# Patient Record
Sex: FEMALE | Race: WHITE | NOT HISPANIC OR LATINO | Employment: OTHER | URBAN - METROPOLITAN AREA
[De-identification: names, ages, dates, MRNs, and addresses within clinical notes are randomized per-mention and may not be internally consistent; named-entity substitution may affect disease eponyms.]

---

## 2017-01-11 ENCOUNTER — APPOINTMENT (OUTPATIENT)
Dept: RADIOLOGY | Facility: MEDICAL CENTER | Age: 37
End: 2017-01-11
Attending: EMERGENCY MEDICINE
Payer: MEDICARE

## 2017-01-11 ENCOUNTER — HOSPITAL ENCOUNTER (EMERGENCY)
Facility: MEDICAL CENTER | Age: 37
End: 2017-01-11
Attending: EMERGENCY MEDICINE
Payer: MEDICARE

## 2017-01-11 VITALS
RESPIRATION RATE: 18 BRPM | SYSTOLIC BLOOD PRESSURE: 120 MMHG | HEIGHT: 67 IN | OXYGEN SATURATION: 100 % | DIASTOLIC BLOOD PRESSURE: 71 MMHG | BODY MASS INDEX: 29.48 KG/M2 | WEIGHT: 187.83 LBS | HEART RATE: 75 BPM | TEMPERATURE: 96.6 F

## 2017-01-11 DIAGNOSIS — S93.502A: ICD-10-CM

## 2017-01-11 PROCEDURE — 99284 EMERGENCY DEPT VISIT MOD MDM: CPT

## 2017-01-11 PROCEDURE — 700102 HCHG RX REV CODE 250 W/ 637 OVERRIDE(OP): Performed by: EMERGENCY MEDICINE

## 2017-01-11 PROCEDURE — 302874 HCHG BANDAGE ACE 2 OR 3""

## 2017-01-11 PROCEDURE — 73630 X-RAY EXAM OF FOOT: CPT | Mod: LT

## 2017-01-11 PROCEDURE — A9270 NON-COVERED ITEM OR SERVICE: HCPCS | Performed by: EMERGENCY MEDICINE

## 2017-01-11 RX ORDER — HYDROCODONE BITARTRATE AND ACETAMINOPHEN 5; 325 MG/1; MG/1
1-2 TABLET ORAL EVERY 4 HOURS PRN
Qty: 12 TAB | Refills: 0 | Status: SHIPPED | OUTPATIENT
Start: 2017-01-11 | End: 2018-05-20

## 2017-01-11 RX ORDER — HYDROCODONE BITARTRATE AND ACETAMINOPHEN 5; 325 MG/1; MG/1
1 TABLET ORAL ONCE
Status: COMPLETED | OUTPATIENT
Start: 2017-01-11 | End: 2017-01-11

## 2017-01-11 RX ADMIN — HYDROCODONE BITARTRATE AND ACETAMINOPHEN 1 TABLET: 5; 325 TABLET ORAL at 19:57

## 2017-01-11 ASSESSMENT — PAIN SCALES - GENERAL: PAINLEVEL_OUTOF10: 10

## 2017-01-11 NOTE — ED AVS SNAPSHOT
1/11/2017          Lyssa Galvan  [address] [place] [address]    Dear Lyssa:    [facility] wants to ensure your discharge home is safe and you or your loved ones have had all your questions answered regarding your care after you leave the hospital.    You may receive a telephone call within two days of your discharge.  This call is to make certain you understand your discharge instructions as well as ensure we provided you with the best care possible during your stay with us.     The call will only last approximately 3-5 minutes and will be done by a nurse.    Once again, we want to ensure your discharge home is safe and that you have a clear understanding of any next steps in your care.  If you have any questions or concerns, please do not hesitate to contact us, we are here for you.  Thank you for choosing [facility] for your healthcare needs.    Sincerely,    Surinder Donohue    [facility]

## 2017-01-11 NOTE — ED AVS SNAPSHOT
After Visit Summary                                                                                                                Lyssa Galvan   MRN: [id number]    Department:  [facility], Emergency Dept   Date of Visit:  1/11/2017            [facility], Emergency Dept    [address]    [place] [address]    Phone:  [phone]      You were seen by     Leanne Davis M.D.      Your Diagnosis Was     Sprain of toe, great, left, initial encounter     S93.502A       These are the medications you received during your hospitalization from 01/11/2017 1832 to 01/11/2017 2027     Date/Time Order Dose Route Action    01/11/2017 1957 hydrocodone-acetaminophen (NORCO) 5-325 MG per tablet 1 Tab 1 Tab Oral Given      Follow-up Information     1. Follow up with [facility].    Specialty:  Family Medicine    Contact information    [address]  [address]  [place] [address] [phone]        Medication Information     Review all of your home medications and newly ordered medications with your primary doctor and/or pharmacist as soon as possible. Follow medication instructions as directed by your doctor and/or pharmacist.     Please keep your complete medication list with you and share with your physician. Update the information when medications are discontinued, doses are changed, or new medications (including over-the-counter products) are added; and carry medication information at all times in the event of emergency situations.               Medication List      START taking these medications        Instructions    hydrocodone-acetaminophen 5-325 MG Tabs per tablet   Commonly known as:  NORCO    Take 1-2 Tabs by mouth every four hours as needed.   Dose:  1-2 Tab         ASK your doctor about these medications        Instructions    DEPO-PROVERA IM    by Intramuscular route.       divalproex 250 MG Tbec   Commonly known as:  DEPAKOTE    Take 250 mg by mouth 3 times a day. 5 tabs  am, 5 tabs pm   Dose:  250 mg       FOLIC ACID PO    Take  by mouth.       ibuprofen 600 MG Tabs   Commonly known as:  MOTRIN    Take 600 mg by mouth.   Dose:  600 mg       IRON CR PO    Take  by mouth.       PRENATAL FORMULA PO    Take  by mouth. 2 different types       TEGRETOL  MG Tb12   Generic drug:  carbamazepine SR    Take 600 mg by mouth 2 times a day.   Dose:  600 mg               Procedures and tests performed during your visit     DX-FOOT-COMPLETE 3+ LEFT        Discharge Instructions       Foot Sprain  You have a sprained foot. When you twist your foot, the ligaments that hold the joints together are injured. This may cause pain, swelling, bruising, and difficulty walking. Proper treatment will shorten your disability and help you prevent re-injury. To treat a sprained foot you should:    Do not drive, operate any machinery, or partake in dangerous activities that require maximum physical and mental performance while taking the prescribed pain killer. Do not take tylenol or tylenol containing products in addition to the pain killer that was prescibed, as the excess tylenol can cause life threatening liver problems. Do not combine this drug with alcohol or other sedatives or narcotics. Follow up with your primary care doctor in regards to the future management of this medication.    · Elevate your foot for the next 2-3 days to reduce swelling.   · Apply ice packs to the foot for 20-30 minutes every 2-3 hours.   · Wrap your foot with a compression bandage as long as it is swollen or tender.   · Do not walk on your foot if it still hurts a lot.  Use crutches or a cane until weight bearing becomes painless.   · Special podiatric shoes or shoes with rigid soles may be useful in allowing earlier walking.   Only take over-the-counter or prescription medicines for pain, discomfort, or fever as directed by your caregiver. Most foot sprains will heal completely in 3-6 weeks with proper rest.  If you still  have pain or swelling after 2-3 weeks, or if your pain worsens, you should see your doctor for further evaluation.  Document Released: 01/25/2006 Document Revised: 03/11/2013 Document Reviewed: 12/19/2009  [employer]® Patient Information ©2013 [employer].          Patient Information     Patient Information    Following emergency treatment: all patient requiring follow-up care must return either to a private physician or a clinic if your condition worsens before you are able to obtain further medical attention, please return to the emergency room.     Billing Information    At [facility], we work to make the billing process streamlined for our patients.  Our Representatives are here to answer any questions you may have regarding your hospital bill.  If you have insurance coverage and have supplied your insurance information to us, we will submit a claim to your insurer on your behalf.  Should you have any questions regarding your bill, we can be reached online or by phone as follows:  Online: You are able pay your bills online or live chat with our representatives about any billing questions you may have. We are here to help Monday - Friday from 8:00am to 7:30pm and 9:00am - 12:00pm on Saturdays.  Please visit [email or web address]  for more information.   Phone:  [phone] or [phone]    Please note that your emergency physician, surgeon, pathologist, radiologist, anesthesiologist, and other specialists are not employed by [facility] and will therefore bill separately for their services.  Please contact them directly for any questions concerning their bills at the numbers below:     Emergency Physician Services:  [phone]  [facility]:  [phone]  [facility]:  [phone]  [facility]:  [phone]    1. Your final bill may vary from the amount quoted upon discharge if all procedures are not complete at that time, or  if your doctor has additional procedures of which we are not aware. You will receive an additional bill if you return to the Emergency Department at [facility] for suture removal regardless of the facility of which the sutures were placed.     2. Please arrange for settlement of this account at the emergency registration.    3. All self-pay accounts are due in full at the time of treatment.  If you are unable to meet this obligation then payment is expected within 4-5 days.     4. If you have had radiology studies (CT, X-ray, Ultrasound, MRI), you have received a preliminary result during your emergency department visit. Please contact the radiology department [phone] to receive a copy of your final result. Please discuss the Final result with your primary physician or with the follow up physician provided.     Crisis Hotline:  [place] Crisis Hotline:  7-512-HJLPCQM or 1-286.236.7375  [place] Crisis Hotline:    1-182.493.8574 or 389-467-8144         ED Discharge Follow Up Questions    1. In order to provide you with very good care, we would like to follow up with a phone call in the next few days.  May we have your permission to contact you?     YES /  NO    2. What is the best phone number to call you? (       )_____-__________    3. What is the best time to call you?      Morning  /  Afternoon  /  Evening                   Patient Signature:  ____________________________________________________________    Date:  ____________________________________________________________

## 2017-01-11 NOTE — ED AVS SNAPSHOT
PatientsLikeMe Access Code: [email or web address]  Expires: 2/10/2017  8:27 PM    Your email address is not on file at [facility].  Email Addresses are required for you to sign up for PatientsLikeMe, please contact [phone] to verify your personal information and to provide your email address prior to attempting to register for PatientsLikeMe.    Lyssa [address] [place] [address]    [employer]  A secure, online tool to manage your health information     [facility]’s PatientsLikeMe® is a secure, online tool that connects you to your personalized health information from the privacy of your home -- day or night - making it very easy for you to manage your healthcare. Once the activation process is completed, you can even access your medical information using the PatientsLikeMe winnie, which is available for free in the Apple Winnie store or Google Play store.     To learn more about PatientsLikeMe, visit [email or web address]    There are two levels of access available (as shown below):   My Chart Features  [facility] Primary Care Doctor [facility]  Specialists [facility]  Urgent  Care Non-[facility] Primary Care Doctor   Email your healthcare team securely and privately 24/7 X X X    Manage appointments: schedule your next appointment; view details of past/upcoming appointments X      Request prescription refills. X      View recent personal medical records, including lab and immunizations X X X X   View health record, including health history, allergies, medications X X X X   Read reports about your outpatient visits, procedures, consult and ER notes X X X X   See your discharge summary, which is a recap of your hospital and/or ER visit that includes your diagnosis, lab results, and care plan X X  X     How to register for PatientsLikeMe:  Once your e-mail address has been verified, follow the following steps to sign up for PatientsLikeMe.     1. Go to  [email or web address]  2. Click on the Sign Up Now box, which takes you to the New Member Sign Up page. You  will need to provide the following information:  a. Enter your [employer] Access Code exactly as it appears at the top of this page. (You will not need to use this code after you’ve completed the sign-up process. If you do not sign up before the expiration date, you must request a new code.)   b. Enter your date of birth.   c. Enter your home email address.   d. Click Submit, and follow the next screen’s instructions.  3. Create a [employer] ID. This will be your [employer] login ID and cannot be changed, so think of one that is secure and easy to remember.  4. Create a [employer] password. You can change your password at any time.  5. Enter your Password Reset Question and Answer. This can be used at a later time if you forget your password.   6. Enter your e-mail address. This allows you to receive e-mail notifications when new information is available in [employer].  7. Click Sign Up. You can now view your health information.    For assistance activating your [employer] account, call [phone]

## 2017-01-12 NOTE — ED NOTES
Ace wrap applied as instructed by Dr. Davis. Patient appropriate for discharge per ERP. Discussed discharge instructions, home care, and follow up with patient. Patient verbalized understanding. No distress noted. Pt sts family is providing ride home.

## 2017-01-12 NOTE — ED PROVIDER NOTES
"ED Provider Note    CHIEF COMPLAINT  Chief Complaint   Patient presents with   • T-5000 Extremity Pain       HPI  Lyssa Galvan is a 36 y.o. female who presents to the emergency department with chief complaint of left great toe pain. The patient states that a few days ago she accidentally kicked her bed and the bare feet, since then she's had worsening pain and radiates up her big toe and through the midfoot. She denies any other trauma, numbness tingling or open wounds. She has not been doing any rice or elevation treatment has not been wrapping it has been taking ibuprofen but not any other medications. Denies any other trauma    REVIEW OF SYSTEMS  See HPI for further details. All other systems are negative.     PAST MEDICAL HISTORY   has a past medical history of Seizure and Stroke.    SOCIAL HISTORY  Social History     Social History Main Topics   • Smoking status: Never Smoker    • Smokeless tobacco: Not on file   • Alcohol Use: No   • Drug Use: No   • Sexual Activity: Not on file       SURGICAL HISTORY  patient denies any surgical history    CURRENT MEDICATIONS  Home Medications     **Home medications have not yet been reviewed for this encounter**          ALLERGIES  No Known Allergies    PHYSICAL EXAM  VITAL SIGNS: /71 mmHg  Pulse 75  Temp(Src) 35.9 °C (96.6 °F)  Resp 18  Ht 1.702 m (5' 7\")  Wt 85.2 kg (187 lb 13.3 oz)  BMI 29.41 kg/m2  SpO2 100%   Pulse ox interpretation: I interpret this pulse ox as normal.  Constitutional: Alert in no apparent distress.  HENT: Normocephalic, Atraumatic  Eyes: Pupils are equal and reactive. Conjunctiva normal, non-icteric.   Heart: Regular rate and rythm, no murmurs.    Lungs: Clear to auscultation bilaterally.  Abdomen: Non-tender, non-distended, normal bowel sounds  EXT: L big to pain w movement of the great toe along the flexus hallices longus - some swelling of the midfoot and ttp and no erythema or warmth to the touch or joint effusion  Skin: Warm, Dry, " No erythema, No rash.   Neurologic: Alert, Grossly non-focal.       DIFFERENTIAL DIAGNOSIS AND WORK UP PLAN    This is a 36 y.o. female who presents with left great toe pain and midfoot swelling without any evidence of big toe joints or ankle swelling that would indicate a septic infection or any evidence of warmth or erythema - not concerning for cellulitis or abscess. Patient likely has strain sprain or occult fracture, will put ice on the foot elevate treat her with oral pain medicine x-ray and reassess    Radiology  DX-FOOT-COMPLETE 3+ LEFT   Final Result         1.  No acute traumatic bony injury.        The radiologist's interpretation of all radiological studies have been reviewed by me.    COURSE & MEDICAL DECISION MAKING  Pertinent Labs & Imaging studies reviewed. (See chart for details)    8:27 PM  Rechecked the patient after the eyes neuro pain med she is feeling better, discussed with her x-ray findings likely has strain to the mid foot big toe, she feels comfortable going home we discussed Rice treatment she states his heart because she has a 2-year-old daughter, however she'll be sent home with pain meds discussed that she needs to rest all symptoms only get worse she was given an Ace wrap and primary care follow-up    Her pmpaware showed no signs of abuse    The patient will not drink alcohol nor drive with prescribed medications. The patient will return for worsening symptoms and is stable at the time of discharge. The patient verbalizes understanding and will comply.    FOLLOW UP    [facility]  [address]  [address]  [place] [address]  [phone]            FINAL IMPRESSION  1. Sprain of toe, great, left, initial encounter            Electronically signed by: Leanne Davis, 1/11/2017 7:39 PM    This dictation has been created using voice recognition software and/or scribes. The accuracy of the dictation is limited by the abilities of the software and the expertise of the scribes. I expect  there may be some errors of grammar and possibly content. I made every attempt to manually correct the errors within my dictation. However, errors related to voice recognition software and/or scribes may still exist and should be interpreted within the appropriate context.

## 2017-01-12 NOTE — DISCHARGE INSTRUCTIONS
Foot Sprain  You have a sprained foot. When you twist your foot, the ligaments that hold the joints together are injured. This may cause pain, swelling, bruising, and difficulty walking. Proper treatment will shorten your disability and help you prevent re-injury. To treat a sprained foot you should:    Do not drive, operate any machinery, or partake in dangerous activities that require maximum physical and mental performance while taking the prescribed pain killer. Do not take tylenol or tylenol containing products in addition to the pain killer that was prescibed, as the excess tylenol can cause life threatening liver problems. Do not combine this drug with alcohol or other sedatives or narcotics. Follow up with your primary care doctor in regards to the future management of this medication.    · Elevate your foot for the next 2-3 days to reduce swelling.   · Apply ice packs to the foot for 20-30 minutes every 2-3 hours.   · Wrap your foot with a compression bandage as long as it is swollen or tender.   · Do not walk on your foot if it still hurts a lot.  Use crutches or a cane until weight bearing becomes painless.   · Special podiatric shoes or shoes with rigid soles may be useful in allowing earlier walking.   Only take over-the-counter or prescription medicines for pain, discomfort, or fever as directed by your caregiver. Most foot sprains will heal completely in 3-6 weeks with proper rest.  If you still have pain or swelling after 2-3 weeks, or if your pain worsens, you should see your doctor for further evaluation.  Document Released: 01/25/2006 Document Revised: 03/11/2013 Document Reviewed: 12/19/2009  [employer]® Patient Information ©2013 [employer].

## 2018-05-20 ENCOUNTER — HOSPITAL ENCOUNTER (EMERGENCY)
Facility: MEDICAL CENTER | Age: 38
End: 2018-05-20
Attending: EMERGENCY MEDICINE
Payer: MEDICARE

## 2018-05-20 ENCOUNTER — APPOINTMENT (OUTPATIENT)
Dept: RADIOLOGY | Facility: MEDICAL CENTER | Age: 38
End: 2018-05-20
Attending: EMERGENCY MEDICINE
Payer: MEDICARE

## 2018-05-20 VITALS
SYSTOLIC BLOOD PRESSURE: 117 MMHG | DIASTOLIC BLOOD PRESSURE: 78 MMHG | OXYGEN SATURATION: 97 % | HEART RATE: 72 BPM | RESPIRATION RATE: 16 BRPM | WEIGHT: 179.45 LBS | TEMPERATURE: 98.3 F | BODY MASS INDEX: 28.11 KG/M2

## 2018-05-20 VITALS
HEIGHT: 67 IN | RESPIRATION RATE: 16 BRPM | WEIGHT: 189 LBS | TEMPERATURE: 98.3 F | OXYGEN SATURATION: 99 % | DIASTOLIC BLOOD PRESSURE: 68 MMHG | SYSTOLIC BLOOD PRESSURE: 107 MMHG | HEART RATE: 73 BPM | BODY MASS INDEX: 29.66 KG/M2

## 2018-05-20 DIAGNOSIS — M25.571 CHRONIC PAIN OF RIGHT ANKLE: Primary | ICD-10-CM

## 2018-05-20 DIAGNOSIS — S93.401A SPRAIN OF RIGHT ANKLE, UNSPECIFIED LIGAMENT, INITIAL ENCOUNTER: ICD-10-CM

## 2018-05-20 DIAGNOSIS — G89.29 CHRONIC PAIN OF RIGHT ANKLE: Primary | ICD-10-CM

## 2018-05-20 PROCEDURE — A9270 NON-COVERED ITEM OR SERVICE: HCPCS | Performed by: EMERGENCY MEDICINE

## 2018-05-20 PROCEDURE — 99284 EMERGENCY DEPT VISIT MOD MDM: CPT

## 2018-05-20 PROCEDURE — 73610 X-RAY EXAM OF ANKLE: CPT | Mod: RT

## 2018-05-20 PROCEDURE — 700102 HCHG RX REV CODE 250 W/ 637 OVERRIDE(OP): Performed by: EMERGENCY MEDICINE

## 2018-05-20 PROCEDURE — 73630 X-RAY EXAM OF FOOT: CPT | Mod: RT

## 2018-05-20 PROCEDURE — 99283 EMERGENCY DEPT VISIT LOW MDM: CPT

## 2018-05-20 RX ORDER — IBUPROFEN 600 MG/1
600 TABLET ORAL ONCE
Status: COMPLETED | OUTPATIENT
Start: 2018-05-20 | End: 2018-05-20

## 2018-05-20 RX ORDER — IBUPROFEN 600 MG/1
600 TABLET ORAL EVERY 6 HOURS PRN
Qty: 30 TAB | Refills: 0 | Status: SHIPPED | OUTPATIENT
Start: 2018-05-20 | End: 2018-10-07

## 2018-05-20 RX ADMIN — IBUPROFEN 600 MG: 600 TABLET, FILM COATED ORAL at 19:37

## 2018-05-20 ASSESSMENT — PAIN SCALES - GENERAL
PAINLEVEL_OUTOF10: 10

## 2018-05-20 NOTE — ED NOTES
Pt roomed from nidia singh. Gait steady. Pt presents for symptoms as stated in the triage note. Pt denies any injury for ankle pain. Pt states pain has been present x1 week. Pt states she is able to walk on foot but has increase in pain when doing so.

## 2018-05-20 NOTE — ED PROVIDER NOTES
"ED Provider Note    CHIEF COMPLAINT  Chief Complaint   Patient presents with   • Ankle Pain     Right       HPI  Lyssa Leblanc is a 37 y.o. female who and relates to the emergency department with a limp and ankle brace complaining of right ankle pain and swelling.  Patient with history of chronic right ankle pain, swelling, stiffness since childhood surgery, recently started going to physical therapy for decreased range of motion.  Patient complains of increased swelling to the right ankle, pain with range of motion.  Feels as though her ankle splint is ineffective.  Denies any trauma, fall or injury aside from increased activities at physical therapy.  Denies discoloration.  Denies paresthesias.  Denies knee or hip pain.    REVIEW OF SYSTEMS  See HPI for further details.     PAST MEDICAL HISTORY   has a past medical history of Seizure (HCC) and Stroke (HCC).    SOCIAL HISTORY  Social History     Social History Main Topics   • Smoking status: Never Smoker   • Smokeless tobacco: Never Used   • Alcohol use No   • Drug use: No   • Sexual activity: Not on file       SURGICAL HISTORY  patient denies any surgical history    CURRENT MEDICATIONS  Home Medications     Reviewed by Kayce Lora R.N. (Registered Nurse) on 05/20/18 at 1150  Med List Status: Complete   Medication Last Dose Status   carbamazepine SR (TEGRETOL XR) 400 MG TB12 5/20/2018 Active   divalproex EC (DEPAKOTE) 250 MG TBEC 5/20/2018 Active                ALLERGIES  No Known Allergies    PHYSICAL EXAM  VITAL SIGNS: /68   Pulse 73   Temp 36.8 °C (98.3 °F)   Resp 16   Ht 1.702 m (5' 7\")   Wt 85.7 kg (189 lb)   SpO2 99%   BMI 29.60 kg/m²   Pulse ox interpretation: I interpret this pulse ox as normal.  Constitutional: Alert in no apparent distress.  Odd affect.  HENT: Normocephalic, atraumatic. Bilateral external ears normal, Nose normal. Moist mucous membranes.    Eyes: Pupils are equal and reactive, Conjunctiva normal.   Neck: Normal range " of motion, Supple  Cardiovascular: Normal peripheral perfusion.  Thorax & Lungs: Nonlabored respirations.  Skin: Warm, Dry  Musculoskeletal: Tenderness to palpation right lateral malleolus.  Minimal swelling.  No crepitus or deformity.  Decreased range of motion, appears chronic secondary to stiffness.  2+ DP, sensation intact to light touch distally.  Patient bears weight and ambulates independently with mild limp.  Full range of motion at knee and hip without difficulty or discomfort.  Neurologic: Alert , moves 4 extremities spontaneously per  Psychiatric: Odd affect.  Cooperative.      DIAGNOSTIC STUDIES / PROCEDURES    RADIOLOGY  DX-ANKLE 3+ VIEWS RIGHT    (Results Pending)       COURSE & MEDICAL DECISION MAKING  ED evaluation for acute on chronic right ankle pain and swelling clinically unrevealing.  CMS is intact distally.  No cutaneous lesions or evidence for cellulitis.  Patient denies new trauma or injury but does state symptoms have worsened since starting physical therapy.  Patient requests x-ray for further evaluation as she states she was referred from physical therapy but has left AMA without further evaluation, imaging or discussion with me.  Patient ambulated from the emergency department.      FINAL IMPRESSION  (M25.571,  G89.29) Chronic pain of right ankle  (primary encounter diagnosis)      Electronically signed by: Leanne Jones, 5/20/2018 12:39 PM      This dictation was created using voice recognition software. The accuracy of the dictation is limited to the abilities of the software. I expect there may be some errors of grammar and possibly content. The nursing notes were reviewed and certain aspects of this information were incorporated into this note.

## 2018-05-20 NOTE — ED TRIAGE NOTES
"Pt c/o right ankle pain for several years. Pt states \"I need an airsplint\". Has own splint on she bought from [employer].   "

## 2018-05-21 NOTE — DISCHARGE INSTRUCTIONS
Ice to painful area 20 minutes at a time 3-4 times a day. Return for increasing pain, numbness, weakness or cold blue foot.   Ankle Sprain  Introduction  An ankle sprain is a stretch or tear in one of the tough tissues (ligaments) in your ankle.  Follow these instructions at home:  · Rest your ankle.  · Take over-the-counter and prescription medicines only as told by your doctor.  · For 2-3 days, keep your ankle higher than the level of your heart (elevated) as much as possible.  · If directed, put ice on the area:  ¨ Put ice in a plastic bag.  ¨ Place a towel between your skin and the bag.  ¨ Leave the ice on for 20 minutes, 2-3 times a day.  · If you were given a brace:  ¨ Wear it as told.  ¨ Take it off to shower or bathe.  ¨ Try not to move your ankle much, but wiggle your toes from time to time. This helps to prevent swelling.  · If you were given an elastic bandage (dressing):  ¨ Take it off when you shower or bathe.  ¨ Try not to move your ankle much, but wiggle your toes from time to time. This helps to prevent swelling.  ¨ Adjust the bandage to make it more comfortable if it feels too tight.  ¨ Loosen the bandage if you lose feeling in your foot, your foot tingles, or your foot gets cold and blue.  · If you have crutches, use them as told by your doctor. Continue to use them until you can walk without feeling pain in your ankle.  Contact a doctor if:  · Your bruises or swelling are quickly getting worse.  · Your pain does not get better after you take medicine.  Get help right away if:  · You cannot feel your toes or foot.  · Your toes or your foot looks blue.  · You have very bad pain that gets worse.  This information is not intended to replace advice given to you by your health care provider. Make sure you discuss any questions you have with your health care provider.  Document Released: 06/05/2009 Document Revised: 05/25/2017 Document Reviewed: 07/19/2016  © 2017 Elsevier

## 2018-05-21 NOTE — ED NOTES
D/C instructions provided to patient at bedside, verbalizes understanding and states plans for follow-up with PCP as recommended.   Scripts given. Vitals obtained.   All belongings accounted for, all questions answered at this time.

## 2018-05-21 NOTE — ED PROVIDER NOTES
"ED Provider Note    Scribed for Terrance Cardenas M.D. by Regan Grace. 5/20/2018, 7:09 PM.    Primary care provider: [facility] (Inactive)  Means of arrival: Walk-in  History obtained from: Patient  History limited by: None    CHIEF COMPLAINT  Chief Complaint   Patient presents with   • Ankle Pain     pain and swelling to (R) for \"a long time\",  hx of surgery on same foot        HPI  Lyssa Leblanc is a 37 y.o. female who presents to the Emergency Department with right ankle pain onset \"a long time ago\". The patient has a history of a surgery on the same foot. She states she had \"tendons removed when I was little because I was walking inwards\". Since this surgery she reports associated edema. Patient denies any new injuries or trauma. She states she just started physical therapy with some relief to her pain. The patient denies fever, chills, and chest pain. Lyssa has not seen an orthopedic surgeon.     REVIEW OF SYSTEMS  Pertinent positives include right ankle pain, right ankle swelling. Pertinent negatives include fever, chills, chest pain. E.     PAST MEDICAL HISTORY   has a past medical history of Seizure (HCC) and Stroke (HCC).    SURGICAL HISTORY  patient denies any surgical history    SOCIAL HISTORY  Social History   Substance Use Topics   • Smoking status: Never Smoker   • Smokeless tobacco: Never Used   • Alcohol use No      History   Drug Use No       FAMILY HISTORY  No pertinent history noted.     CURRENT MEDICATIONS  Home Medications    **Home medications have not yet been reviewed for this encounter**         ALLERGIES  No Known Allergies    PHYSICAL EXAM  VITAL SIGNS: /69   Pulse 68   Temp 36.8 °C (98.3 °F)   Resp 16   Wt 81.4 kg (179 lb 7.3 oz)   SpO2 99%   BMI 28.11 kg/m²     Constitutional: Well developed, Well nourished, Mild distress.   HENT: Normocephalic, Atraumatic.  Eyes: Conjunctiva normal, No discharge.   Cardiovascular: Normal heart rate, Normal rhythm, No " murmurs, equal pulses.   Pulmonary: Normal breath sounds, No respiratory distress, No wheezing, No rales, No rhonchi.  Chest: No chest wall tenderness or deformity.    Musculoskeletal: Tenderness along the distal posterior aspect of the right malleolus as well as the proximal 5th metatarsal. 2+ DP. Normal capillary refill in all 5 toes. No other bony tenderness. No pain or tenderness in the proximal fibula. Mild swelling.   Skin: Warm, Dry, No erythema, No rash.   Neurologic: Alert & oriented x 3, Normal motor function,  No focal deficits noted.   Psychiatric: Affect normal, Judgment normal, Mood normal.     RADIOLOGY  DX-FOOT-COMPLETE 3+ RIGHT   Final Result      No evidence of fracture or dislocation.      DX-ANKLE 3+ VIEWS RIGHT   Final Result      No evidence of fracture or dislocation.        The radiologist's interpretation of all radiological studies have been reviewed by me.    COURSE & MEDICAL DECISION MAKING  Pertinent Labs & Imaging studies reviewed. (See chart for details)    7:09 PM - Patient seen and examined at bedside. Patient will be treated with Motrin 600 mg. Ordered Dx-Foot, Dx-Ankle to evaluate her symptoms. The differential diagnoses include but are not limited to: Fracture vs sprain vs chronic pain    7:49 PM Recheck: Patient is resting comfortably and reports feeling improved. I updated her on her results, which did not indicate any acute abnormalities. I explained that she is now stable for discharge with a prescription for Motrin. I advised her to follow up with her primary care provider and to return to the ED for new or worsening symptoms. She understands and will comply.     Medical Decision Making: At this point time I think patient most likely has a slight sprain or increased swelling secondary to her physical therapy.  X-rays are negative for any fracture.  At this point in time I will have the patient follow-up with orthopedics if she continues to have significant pain and swelling.   Patient was given a ankle splint for support.  DISPOSITION:  Patient will be discharged home in stable condition.    FOLLOW UP:  [facility]  [address]  [address]  [place] [address]  [phone]    Schedule an appointment as soon as possible for a visit in 3 days      Mack Alfaro M.D.  [address] [place] [address]  [phone]    In 1 week  If you keep having significant pain.       OUTPATIENT MEDICATIONS:  Discharge Medication List as of 5/20/2018  7:50 PM      START taking these medications    Details   ibuprofen (MOTRIN) 600 MG Tab Take 1 Tab by mouth every 6 hours as needed., Disp-30 Tab, R-0, Print Rx Paper           FINAL IMPRESSION  1. Sprain of right ankle, unspecified ligament, initial encounter          IRegan (Scribe), am scribing for, and in the presence of, Terrance Cardenas M.D.    Electronically signed by: Regan Grace (Scribe), 5/20/2018    ITerrance M.D. personally performed the services described in this documentation, as scribed by Regan Grace in my presence, and it is both accurate and complete.    The note accurately reflects work and decisions made by me.  Terrance Cardenas  5/21/2018  2:49 AM

## 2018-05-21 NOTE — ED TRIAGE NOTES
"Lyssa Leblanc  Chief Complaint   Patient presents with   • Ankle Pain     pain and swelling to (R) for \"a long time\",  hx of surgery on same foot      Pt ambulatory to triage with above complaint.  VSS.   Pt returned to lobby, educated on triage process, and to inform staff of any changes or concerns.     "

## 2018-05-23 ENCOUNTER — OFFICE VISIT (OUTPATIENT)
Dept: NEUROLOGY | Facility: MEDICAL CENTER | Age: 38
End: 2018-05-23
Payer: MEDICARE

## 2018-05-23 VITALS
HEIGHT: 67 IN | HEART RATE: 69 BPM | BODY MASS INDEX: 28.24 KG/M2 | WEIGHT: 179.9 LBS | SYSTOLIC BLOOD PRESSURE: 108 MMHG | OXYGEN SATURATION: 92 % | DIASTOLIC BLOOD PRESSURE: 66 MMHG | TEMPERATURE: 98.2 F | RESPIRATION RATE: 16 BRPM

## 2018-05-23 DIAGNOSIS — G40.909 SEIZURE DISORDER (HCC): ICD-10-CM

## 2018-05-23 DIAGNOSIS — I63.9 CEREBROVASCULAR ACCIDENT (CVA), UNSPECIFIED MECHANISM (HCC): ICD-10-CM

## 2018-05-23 DIAGNOSIS — F81.9 LEARNING DIFFICULTY: ICD-10-CM

## 2018-05-23 PROCEDURE — 99204 OFFICE O/P NEW MOD 45 MIN: CPT | Performed by: NURSE PRACTITIONER

## 2018-05-23 RX ORDER — CARBAMAZEPINE 400 MG/1
400 TABLET, EXTENDED RELEASE ORAL 2 TIMES DAILY
Qty: 60 TAB | Refills: 5 | Status: SHIPPED | OUTPATIENT
Start: 2018-05-23 | End: 2018-10-10 | Stop reason: SDUPTHER

## 2018-05-23 RX ORDER — DIVALPROEX SODIUM 250 MG/1
TABLET, DELAYED RELEASE ORAL
Qty: 300 TAB | Refills: 5 | Status: SHIPPED | OUTPATIENT
Start: 2018-05-23 | End: 2018-10-10 | Stop reason: SDUPTHER

## 2018-05-23 ASSESSMENT — ENCOUNTER SYMPTOMS
COUGH: 0
CONSTITUTIONAL NEGATIVE: 1
NAUSEA: 0
ABDOMINAL PAIN: 0
HEADACHES: 0
NERVOUS/ANXIOUS: 0
SEIZURES: 0
DIARRHEA: 0
VOMITING: 0
DOUBLE VISION: 0
MUSCULOSKELETAL NEGATIVE: 1
DEPRESSION: 0
SORE THROAT: 0

## 2018-05-23 ASSESSMENT — PATIENT HEALTH QUESTIONNAIRE - PHQ9: CLINICAL INTERPRETATION OF PHQ2 SCORE: 0

## 2018-05-23 NOTE — PROGRESS NOTES
"Subjective:      Lyssa Leblanc is a 37 y.o. female who presents with New Patient (Seizure Disorder)           HPI  Last seen per neurologist in [place], [place].  Moved to the area July 2014.    She is needing some refills of her medication.    History of seizures since birth.  She had a stroke \"after delivery\" about 3 hours.  G1-P1.    Childhood: has been in special needs classes, does have a diploma.  She is transitioned with [facility]-- they come to the house and help her a few days per week.  They help her clean, etc.  Her parents help with her finances and grocery shopping.    Last known seizure was when she was going into labor in 2014.  Eyes turn to the right, has had GTC seizure.    Claims that she is compliant with her medication.    Tried and failed: phenobarbital.    She is the mother of 3 children-- one child passed away-- 24 week premie.  Son has Klinefelters-- XXY chromosome and a \"hole in his heart\" and severe ADHD.  Daughter is 3, teeth are fused, \"hole in her heart that sealed up\", hearing is limited, speech delayed and followed by the [facility] for speech and OT.    Name brand Depakote ER 250mg tablets, 1250mg BID  Tegretol XR 400mg BID    Medical history: Hypercholesterolemia  Teeth: poor dentition, followed through the [facility] clinic    Surgical history: Right hand tendon release, right foot tendon release through [facility].    Lives in [place] and down the street from her father and her step-mother.  TV watcher.  She has a great memory for songs.    She does get very irritable.    Depo-provera.  Current Outpatient Prescriptions   Medication Sig Dispense Refill   • ibuprofen (MOTRIN) 600 MG Tab Take 1 Tab by mouth every 6 hours as needed. 30 Tab 0   • divalproex EC (DEPAKOTE) 250 MG TBEC Take 250 mg by mouth 3 times a day. 5 tabs am, 5 tabs pm     • carbamazepine SR (TEGRETOL XR) 400 MG TB12 Take 600 mg by mouth 2 times a day.       No current facility-administered medications for this " "visit.          Review of Systems   Constitutional: Negative.    HENT: Negative for hearing loss, nosebleeds and sore throat.         No recent head injury.   Eyes: Negative for double vision.        No new loss of vision.   Respiratory: Negative for cough.         No recent lung infections.   Cardiovascular: Negative for chest pain.   Gastrointestinal: Negative for abdominal pain, diarrhea, nausea and vomiting.   Genitourinary: Negative.    Musculoskeletal: Negative.    Skin: Negative.    Neurological: Negative for seizures and headaches.   Endo/Heme/Allergies:        No history of endocrine dysfunction.  No new problems.   Psychiatric/Behavioral: Negative for depression. The patient is not nervous/anxious.         No recent mood changes.          Objective:     /66   Pulse 69   Temp 36.8 °C (98.2 °F)   Resp 16   Ht 1.702 m (5' 7\")   Wt 81.6 kg (179 lb 14.3 oz)   SpO2 92%   BMI 28.18 kg/m²      Physical Exam   Constitutional: She is oriented to person, place, and time. She appears well-developed and well-nourished. No distress.   HENT:   Head: Normocephalic and atraumatic.   Nose: Nose normal.   Eyes: Pupils are equal, round, and reactive to light.   Used to wear glasses   Neck: Normal range of motion.   Cardiovascular: Normal rate and regular rhythm.  Exam reveals no gallop and no friction rub.    No murmur heard.  Pulmonary/Chest: Effort normal and breath sounds normal. No respiratory distress.   Musculoskeletal: Normal range of motion.   Lymphadenopathy:     She has no cervical adenopathy.   Neurological: She is alert and oriented to person, place, and time. She has normal strength and normal reflexes. No cranial nerve deficit. She exhibits normal muscle tone. Coordination (h/o right tendon release) abnormal. Gait normal.   Naturally right-handed, right hemiparesis.    CN II: Fundi normal, visual fields full to confrontation.  CN III, IV, VI: Pupils equal, round, and reactive to light.  Extraocular " movements full and intact in horizontal and vertical gaze.  CN V: Normal in motor and sensory modalities.  CN VII: No evidence of facial asymmetry.  CN VIII: Hearing grossly intact.  CN IX, X: Palate elevates symmetrically and in the midline.  CN XI: Normal sternocleidomastoid strength.  CN XII: Tongue is in the midline.    Motor: Normal muscle bulk and tone, with full and symmetric strength.  Sensory: Intact to light touch, pinprick, vibration, proprioception, and graphesthesia.  DTR's: 1+ LUE and LLE with flexor plantar responses.  RUE and RLE with tendon releases.  Cerebellar/Coordination: Normal finger to finger, finger-tapping, rapid alternating movements, and foot tapping.  Gait: Normal casual gait.  Walks well on heels and toes, as well as in tandem gait.   Skin: Skin is warm and dry.   Psychiatric:   Learning delayed, quiet             Assessment/Plan:     Seizure Disorder, s/p  intracranial hemorrhage:  History of seizures since birth.  Last known seizure was during labor and delivery in .    No EEG or Brain MRI on record.    Continue Depakote 1250mg BID and Tegretol XR 400mg BID.    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    Discussed potential side effects of Depakote and Tegretol.  I would encourage the option of transitioning off or Tegretol in the near future.    Obtain VEEG to evaluate for subclinical seizures.  Will then evaluate the EEG results and consider AED options.    Needs to be taking PNV, calcium 2000mg and Vit D3 2000IU per day.    Obtain labs as ordered.    Return for follow-up after EEG to discuss management.  Return for follow-up in 4 months.      EDUCATION AND COUNSELING:  -Education was provided to the patient and/or family regarding diagnosis and prognosis. The chronic and unpredictable nature of the condition was discussed. There is increased risk for additional events, which may carry potential for significant injuries and  death.    -We reviewed the current antiepileptic regimen. Potential side effects of antiepileptics were discussed at length, including but no limited to: hypersensitivity reactions (rash and others, some of which can be fatal), visual field changes (some of which may be irreversible), glaucoma, diplopia, kidney stones, osteopenia/osteoporosis/bone fractures, hyperthermia/anhydrosis, tremors, ataxia, dizziness, fatigue, increased risk for falls, cardiac arrhythmias/syncope, gastrointestinal (hepatitis, pancreatitis, gastritis, ulcers), gingival hypertrophy, drowsiness, sedation, anxiety/nervousness, increased risk for suicide, increased risk for depression, and psychosis. We reviewed drug-drug interactions and their potential effect on seizure control and medication side effects.    -Patient/family educated on SUDEP (Sudden Death in Epilepsy). Counseling was provided on the importance of strict medication and follow up compliance. The patient understands the risks associated with non-adherence with the medical plan as outlined, including but not limited to an increased risk for breakthrough seizures, which may contribute to injuries, disability, status epilepticus, and even death.    -Counseling was also provided on potential effects of alcohol and other drugs, which may lower seizure threshold and/or affect the metabolism of antiepileptic drugs. I recommend avoidance of alcohol and illegal drugs.  -Recommend proper hydration, regular exercise, proper sleep hygiene (7-8 hrs of overnight sleep, avoid sleep deprivation).    -I have made the patient aware of mandatory reporting required by the law in the State of [place] regarding episodes of seizures, loss of consciousness, and/or alteration of awareness. The patient and family are responsible for reporting events to the DMV, instructions were provided. The patient verbalized understanding and states she has not been driving. Other seizure precautions were discussed at  length, including no diving, no skydiving, no unsupervised swimming, no Jacuzzi or bathing in bathtubs.    Pt agrees with plan.

## 2018-05-29 LAB
25(OH)D3+25(OH)D2 SERPL-MCNC: 18.8 NG/ML (ref 30–100)
ALBUMIN SERPL-MCNC: 4.2 G/DL (ref 3.5–5.5)
ALBUMIN/GLOB SERPL: 1.8 {RATIO} (ref 1.2–2.2)
ALP SERPL-CCNC: 34 IU/L (ref 39–117)
AST SERPL-CCNC: 12 IU/L (ref 0–40)
BASOPHILS # BLD AUTO: 0 X10E3/UL (ref 0–0.2)
BASOPHILS NFR BLD AUTO: 1 %
BILIRUB SERPL-MCNC: 0.5 MG/DL (ref 0–1.2)
BUN SERPL-MCNC: 8 MG/DL (ref 6–20)
BUN/CREAT SERPL: 12 (ref 9–23)
CALCIUM SERPL-MCNC: 9.3 MG/DL (ref 8.7–10.2)
CARBAMAZEPINE SERPL-MCNC: 6.5 UG/ML (ref 4–12)
CHLORIDE SERPL-SCNC: 104 MMOL/L (ref 96–106)
CREAT SERPL-MCNC: 0.66 MG/DL (ref 0.57–1)
EOSINOPHIL # BLD AUTO: 0.1 X10E3/UL (ref 0–0.4)
EOSINOPHIL NFR BLD AUTO: 2 %
ERYTHROCYTE [DISTWIDTH] IN BLOOD BY AUTOMATED COUNT: 13 % (ref 12.3–15.4)
GFR SERPLBLD CREATININE-BSD FMLA CKD-EPI: 113 ML/MIN/1.73
GFR SERPLBLD CREATININE-BSD FMLA CKD-EPI: 131 ML/MIN/1.73
GLOBULIN SER CALC-MCNC: 2.4 G/DL (ref 1.5–4.5)
GLUCOSE SERPL-MCNC: 87 MG/DL (ref 65–99)
HCT VFR BLD AUTO: 36.2 % (ref 34–46.6)
HGB BLD-MCNC: 12.4 G/DL (ref 11.1–15.9)
IMM GRANULOCYTES # BLD: 0 X10E3/UL (ref 0–0.1)
IMM GRANULOCYTES NFR BLD: 0 %
IMMATURE CELLS  115398: ABNORMAL
LYMPHOCYTES # BLD AUTO: 1.8 X10E3/UL (ref 0.7–3.1)
LYMPHOCYTES NFR BLD AUTO: 44 %
MCH RBC QN AUTO: 31.3 PG (ref 26.6–33)
MCHC RBC AUTO-ENTMCNC: 34.3 G/DL (ref 31.5–35.7)
MCV RBC AUTO: 91 FL (ref 79–97)
MONOCYTES # BLD AUTO: 0.3 X10E3/UL (ref 0.1–0.9)
MONOCYTES NFR BLD AUTO: 9 %
MORPHOLOGY BLD-IMP: ABNORMAL
NEUTROPHILS # BLD AUTO: 1.7 X10E3/UL (ref 1.4–7)
NEUTROPHILS NFR BLD AUTO: 44 %
NRBC BLD AUTO-RTO: ABNORMAL %
PLATELET # BLD AUTO: 144 X10E3/UL (ref 150–379)
POTASSIUM SERPL-SCNC: 4.1 MMOL/L (ref 3.5–5.2)
PROT SERPL-MCNC: 6.6 G/DL (ref 6–8.5)
RBC # BLD AUTO: 3.96 X10E6/UL (ref 3.77–5.28)
SODIUM SERPL-SCNC: 141 MMOL/L (ref 134–144)
VALPROATE SERPL-MCNC: 72 UG/ML (ref 50–100)
WBC # BLD AUTO: 4 X10E3/UL (ref 3.4–10.8)

## 2018-06-04 ENCOUNTER — TELEPHONE (OUTPATIENT)
Dept: NEUROLOGY | Facility: MEDICAL CENTER | Age: 38
End: 2018-06-04

## 2018-06-18 ENCOUNTER — PATIENT MESSAGE (OUTPATIENT)
Dept: NEUROLOGY | Facility: MEDICAL CENTER | Age: 38
End: 2018-06-18

## 2018-06-18 NOTE — TELEPHONE ENCOUNTER
From: Lyssa Leblanc  To: CRISTINO Chamberlain  Sent: 6/18/2018 11:28 AM PDT  Subject: Procedure Question    [facility]? Or do I call to make the appointments?

## 2018-08-08 ENCOUNTER — HOSPITAL ENCOUNTER (OUTPATIENT)
Dept: RADIOLOGY | Facility: MEDICAL CENTER | Age: 38
End: 2018-08-08
Attending: NURSE PRACTITIONER
Payer: MEDICARE

## 2018-08-08 DIAGNOSIS — G40.909 SEIZURE DISORDER (HCC): ICD-10-CM

## 2018-08-08 DIAGNOSIS — M89.9 DISORDER OF BONE: ICD-10-CM

## 2018-08-08 PROCEDURE — 77080 DXA BONE DENSITY AXIAL: CPT

## 2018-08-09 ENCOUNTER — TELEPHONE (OUTPATIENT)
Dept: NEUROLOGY | Facility: MEDICAL CENTER | Age: 38
End: 2018-08-09

## 2018-08-09 NOTE — TELEPHONE ENCOUNTER
Called and spoke with patient today, relayed all information. She understands and will comply with taking the vitamin D and calcium.

## 2018-08-09 NOTE — TELEPHONE ENCOUNTER
Impression       According to the World Health Organization classification, bone mineral density of this patient is osteopenia with increased risk of fracture.     Please let Lyssa know that she needs to supplement with Vit D 2000IU and Calcium 2000mg each day.

## 2018-08-14 ENCOUNTER — TELEPHONE (OUTPATIENT)
Dept: NEUROLOGY | Facility: MEDICAL CENTER | Age: 38
End: 2018-08-14

## 2018-08-16 ENCOUNTER — APPOINTMENT (OUTPATIENT)
Dept: RADIOLOGY | Facility: MEDICAL CENTER | Age: 38
End: 2018-08-16
Attending: EMERGENCY MEDICINE
Payer: MEDICARE

## 2018-08-16 ENCOUNTER — HOSPITAL ENCOUNTER (EMERGENCY)
Facility: MEDICAL CENTER | Age: 38
End: 2018-08-16
Attending: EMERGENCY MEDICINE
Payer: MEDICARE

## 2018-08-16 VITALS
OXYGEN SATURATION: 95 % | HEART RATE: 60 BPM | DIASTOLIC BLOOD PRESSURE: 74 MMHG | BODY MASS INDEX: 28.58 KG/M2 | HEIGHT: 67 IN | TEMPERATURE: 97.6 F | WEIGHT: 182.1 LBS | SYSTOLIC BLOOD PRESSURE: 108 MMHG | RESPIRATION RATE: 18 BRPM

## 2018-08-16 DIAGNOSIS — S40.011A CONTUSION OF RIGHT SHOULDER, INITIAL ENCOUNTER: ICD-10-CM

## 2018-08-16 PROCEDURE — 99284 EMERGENCY DEPT VISIT MOD MDM: CPT

## 2018-08-16 PROCEDURE — 73030 X-RAY EXAM OF SHOULDER: CPT | Mod: RT

## 2018-08-16 ASSESSMENT — PAIN SCALES - GENERAL: PAINLEVEL_OUTOF10: 10
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
## 2018-08-16 NOTE — DISCHARGE INSTRUCTIONS
Cryotherapy  Introduction  WHAT IS CRYOTHERAPY?  Cryotherapy, or cold therapy, is a treatment that uses cold temperatures to treat an injury or medical condition. It includes using cold packs or ice packs to reduce pain and swelling. Only use cryotherapy if your doctor says it is okay.  HOW DO I USE CRYOTHERAPY?  · Place a towel between the cold source and your skin.  · Apply the cold source for no more than 20 minutes at a time.  · Check your skin after 5 minutes to make sure there are no signs of a poor response to cold or skin damage. Check for:  ¨ White spots on your skin. Your skin may look blotchy or mottled.  ¨ Skin that looks blue or pale.  ¨ Skin that feels waxy or hard.  · Repeat these steps as many times each day as told by your doctor.  HOW CAN I MAKE A COLD PACK?  When using a cold pack at home to reduce pain and swelling, you can use:  · A silica gel cold pack that has been left in the freezer. You can buy this online or in stores.  · A plastic bag of frozen vegetables.  · A sealable plastic bag that has been filled with crushed ice.  Always wrap the pack in a dry or damp towel to avoid direct contact with your skin.  WHEN SHOULD I CALL MY DOCTOR?  Call your doctor if:  · You start to have white spots on your skin. This may give your skin a blotchy or mottled look.  · Your skin turns blue or pale.  · Your skin becomes waxy or hard.  · Your swelling gets worse.  This information is not intended to replace advice given to you by your health care provider. Make sure you discuss any questions you have with your health care provider.  Document Released: 06/05/2009 Document Revised: 05/25/2017 Document Reviewed: 08/31/2016  © 2017 Elsevier  Contusion  Introduction  A contusion is a deep bruise. Contusions happen when an injury causes bleeding under the skin. Symptoms of bruising include pain, swelling, and discolored skin. The skin may turn blue, purple, or yellow.  Follow these instructions at home:  · Rest  the injured area.  · If told, put ice on the injured area.  ¨ Put ice in a plastic bag.  ¨ Place a towel between your skin and the bag.  ¨ Leave the ice on for 20 minutes, 2-3 times per day.  · If told, put light pressure (compression) on the injured area using an elastic bandage. Make sure the bandage is not too tight. Remove it and put it back on as told by your doctor.  · If possible, raise (elevate) the injured area above the level of your heart while you are sitting or lying down.  · Take over-the-counter and prescription medicines only as told by your doctor.  Contact a doctor if:  · Your symptoms do not get better after several days of treatment.  · Your symptoms get worse.  · You have trouble moving the injured area.  Get help right away if:  · You have very bad pain.  · You have a loss of feeling (numbness) in a hand or foot.  · Your hand or foot turns pale or cold.  This information is not intended to replace advice given to you by your health care provider. Make sure you discuss any questions you have with your health care provider.  Document Released: 06/05/2009 Document Revised: 05/25/2017 Document Reviewed: 05/04/2016  © 2017 Elsevier

## 2018-08-16 NOTE — ED NOTES
Patient discharged home, instructions given, verbalized understanding, ambulatory to dc area steady gait.
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
## 2018-10-07 ENCOUNTER — APPOINTMENT (OUTPATIENT)
Dept: RADIOLOGY | Facility: MEDICAL CENTER | Age: 38
End: 2018-10-07
Attending: EMERGENCY MEDICINE
Payer: MEDICARE

## 2018-10-07 ENCOUNTER — HOSPITAL ENCOUNTER (EMERGENCY)
Facility: MEDICAL CENTER | Age: 38
End: 2018-10-07
Attending: EMERGENCY MEDICINE
Payer: MEDICARE

## 2018-10-07 VITALS
TEMPERATURE: 98.9 F | HEART RATE: 70 BPM | WEIGHT: 182 LBS | RESPIRATION RATE: 18 BRPM | BODY MASS INDEX: 28.56 KG/M2 | HEIGHT: 67 IN | DIASTOLIC BLOOD PRESSURE: 76 MMHG | SYSTOLIC BLOOD PRESSURE: 126 MMHG

## 2018-10-07 DIAGNOSIS — S40.011A CONTUSION OF RIGHT SHOULDER, INITIAL ENCOUNTER: ICD-10-CM

## 2018-10-07 PROCEDURE — 73030 X-RAY EXAM OF SHOULDER: CPT | Mod: RT

## 2018-10-07 PROCEDURE — 700102 HCHG RX REV CODE 250 W/ 637 OVERRIDE(OP): Performed by: EMERGENCY MEDICINE

## 2018-10-07 PROCEDURE — A9270 NON-COVERED ITEM OR SERVICE: HCPCS | Performed by: EMERGENCY MEDICINE

## 2018-10-07 PROCEDURE — 99284 EMERGENCY DEPT VISIT MOD MDM: CPT

## 2018-10-07 RX ORDER — NAPROXEN 500 MG/1
500 TABLET ORAL 2 TIMES DAILY WITH MEALS
Qty: 20 TAB | Refills: 0 | Status: SHIPPED | OUTPATIENT
Start: 2018-10-07 | End: 2019-01-02

## 2018-10-07 RX ORDER — TRAMADOL HYDROCHLORIDE 50 MG/1
50 TABLET ORAL ONCE
Status: COMPLETED | OUTPATIENT
Start: 2018-10-07 | End: 2018-10-07

## 2018-10-07 RX ADMIN — TRAMADOL HYDROCHLORIDE 50 MG: 50 TABLET, FILM COATED ORAL at 19:57

## 2018-10-07 ASSESSMENT — PAIN SCALES - GENERAL
PAINLEVEL_OUTOF10: 8
PAINLEVEL_OUTOF10: 5

## 2018-10-08 NOTE — ED NOTES
Discharge instructions provided with Rx x 1 sent to pharmacy.  Verbalized understanding of follow-up care, medication management & reasons to return to ED.  Released in stable condition with family.
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
## 2018-10-10 ENCOUNTER — OFFICE VISIT (OUTPATIENT)
Dept: NEUROLOGY | Facility: MEDICAL CENTER | Age: 38
End: 2018-10-10
Payer: MEDICARE

## 2018-10-10 VITALS
HEART RATE: 75 BPM | BODY MASS INDEX: 29.46 KG/M2 | HEIGHT: 67 IN | RESPIRATION RATE: 18 BRPM | DIASTOLIC BLOOD PRESSURE: 62 MMHG | TEMPERATURE: 97.6 F | WEIGHT: 187.7 LBS | SYSTOLIC BLOOD PRESSURE: 104 MMHG | OXYGEN SATURATION: 94 %

## 2018-10-10 DIAGNOSIS — G40.909 SEIZURE DISORDER (HCC): ICD-10-CM

## 2018-10-10 PROCEDURE — 99214 OFFICE O/P EST MOD 30 MIN: CPT | Performed by: NURSE PRACTITIONER

## 2018-10-10 RX ORDER — IBUPROFEN 200 MG
200 TABLET ORAL EVERY 6 HOURS PRN
COMMUNITY
End: 2019-01-02

## 2018-10-10 RX ORDER — DIVALPROEX SODIUM 250 MG/1
TABLET, DELAYED RELEASE ORAL
Qty: 300 TAB | Refills: 1 | Status: SHIPPED | OUTPATIENT
Start: 2018-10-10 | End: 2019-04-01 | Stop reason: SDUPTHER

## 2018-10-10 RX ORDER — CARBAMAZEPINE 400 MG/1
400 TABLET, EXTENDED RELEASE ORAL 2 TIMES DAILY
Qty: 60 TAB | Refills: 1 | Status: SHIPPED | OUTPATIENT
Start: 2018-10-10 | End: 2019-04-01 | Stop reason: SDUPTHER

## 2018-10-10 ASSESSMENT — ENCOUNTER SYMPTOMS
DOUBLE VISION: 0
SORE THROAT: 0
COUGH: 0
SEIZURES: 1
VOMITING: 0
ABDOMINAL PAIN: 0
DEPRESSION: 0
MUSCULOSKELETAL NEGATIVE: 1
NERVOUS/ANXIOUS: 0
CONSTITUTIONAL NEGATIVE: 1
NAUSEA: 0
DIARRHEA: 0
HEADACHES: 0

## 2018-10-10 NOTE — PROGRESS NOTES
Subjective:      Lyssa Leblanc is a 38 y.o. female who presents with Follow-Up (Seizure disorder)        Here with mother today.    HPI Last known seizure was during labor and delivery of last baby in 2014.    Has a concern about her medications today.    She is wondering about her supplements and what she should be taking.      8/2018 Osteopenia: Impression       According to the World Health Organization classification, bone mineral density of this patient is osteopenia with increased risk of fracture.        Ref. Range 5/26/2018 08:12   Carbamazepine, S Latest Ref Range: 4.0 - 12.0 ug/mL 6.5   Valproic Acid Latest Ref Range: 50 - 100 ug/mL 72   25-Hydroxy   Vitamin D 25 Latest Ref Range: 30.0 - 100.0 ng/mL 18.8 (L)     Current Outpatient Prescriptions   Medication Sig Dispense Refill   • ibuprofen (MOTRIN) 200 MG Tab Take 200 mg by mouth every 6 hours as needed.     • naproxen (NAPROSYN) 500 MG Tab Take 1 Tab by mouth 2 times a day, with meals. 20 Tab 0   • divalproex (DEPAKOTE) 250 MG Tablet Delayed Response Take 5 tabs PO BID. 300 Tab 5   • carbamazepine SR (TEGRETOL XR) 400 MG TABLET SR 12 HR Take 1 Tab by mouth 2 times a day. 60 Tab 5   • carbamazepine SR (TEGRETOL XR) 400 MG TB12 Take 600 mg by mouth 2 times a day.       No current facility-administered medications for this visit.        Review of Systems   Constitutional: Negative.    HENT: Negative for hearing loss, nosebleeds and sore throat.         No recent head injury.   Eyes: Negative for double vision.        No new loss of vision.   Respiratory: Negative for cough.         No recent lung infections.   Cardiovascular: Negative for chest pain.   Gastrointestinal: Negative for abdominal pain, diarrhea, nausea and vomiting.   Genitourinary: Negative.    Musculoskeletal: Negative.    Skin: Negative.    Neurological: Positive for seizures. Negative for headaches.   Endo/Heme/Allergies:        No history of endocrine dysfunction.  No new problems.  "  Psychiatric/Behavioral: Negative for depression. The patient is not nervous/anxious.         No recent mood changes.          Objective:     /62 (BP Location: Left arm, Patient Position: Sitting, BP Cuff Size: Adult)   Pulse 75   Temp 36.4 °C (97.6 °F) (Temporal)   Resp 18   Ht 1.702 m (5' 7\")   Wt 85.1 kg (187 lb 11.2 oz)   SpO2 94%   BMI 29.40 kg/m²      Physical Exam   Constitutional: She is oriented to person, place, and time. She appears well-developed and well-nourished. No distress.   HENT:   Head: Normocephalic and atraumatic.   Eyes: EOM are normal.   Used to wear glasses.   Neck: Normal range of motion.   Cardiovascular: Normal rate and regular rhythm.    Pulmonary/Chest: Effort normal and breath sounds normal. No respiratory distress.   Musculoskeletal: Normal range of motion.   Neurological: She is alert and oriented to person, place, and time. She has normal strength and normal reflexes. No cranial nerve deficit. She exhibits normal muscle tone. Coordination ( h/o right tendon ) and gait abnormal.   No observable changes in neurologic status.  See initial new patient examination for details.     Skin: Skin is warm and dry.   Psychiatric: She has a normal mood and affect.   Learning delayed, quiet           Assessment/Plan:     Seizure Disorder, s/p  intracranial hemorrhage:  History of seizures since birth.  Last known seizure was during labor and delivery in .     No Brain MRI on record.     Continue Depakote 1250mg BID and Tegretol XR 400mg BID.     Discussed potential side effects of Depakote and Tegretol.  I would encourage the option of transitioning off or Tegretol in the near future.    Recent VEEG was normal.     Needs to be taking PNV, calcium 2000mg and Vit D3 2000IU per day.     Return for follow-up in 4 months.        EDUCATION AND COUNSELING:  -Education was provided to the patient and/or family regarding diagnosis and prognosis. The chronic and unpredictable " nature of the condition was discussed. There is increased risk for additional events, which may carry potential for significant injuries and death.    -We reviewed the current antiepileptic regimen. Potential side effects of antiepileptics were discussed at length, including but no limited to: hypersensitivity reactions (rash and others, some of which can be fatal), visual field changes (some of which may be irreversible), glaucoma, diplopia, kidney stones, osteopenia/osteoporosis/bone fractures, hyperthermia/anhydrosis, tremors, ataxia, dizziness, fatigue, increased risk for falls, cardiac arrhythmias/syncope, gastrointestinal (hepatitis, pancreatitis, gastritis, ulcers), gingival hypertrophy, drowsiness, sedation, anxiety/nervousness, increased risk for suicide, increased risk for depression, and psychosis. We reviewed drug-drug interactions and their potential effect on seizure control and medication side effects.    -Patient/family educated on SUDEP (Sudden Death in Epilepsy). Counseling was provided on the importance of strict medication and follow up compliance. The patient understands the risks associated with non-adherence with the medical plan as outlined, including but not limited to an increased risk for breakthrough seizures, which may contribute to injuries, disability, status epilepticus, and even death.    -Counseling was also provided on potential effects of alcohol and other drugs, which may lower seizure threshold and/or affect the metabolism of antiepileptic drugs. I recommend avoidance of alcohol and illegal drugs.  -Recommend proper hydration, regular exercise, proper sleep hygiene (7-8 hrs of overnight sleep, avoid sleep deprivation).    -I have made the patient aware of mandatory reporting required by the law in the State of [place] regarding episodes of seizures, loss of consciousness, and/or alteration of awareness. The patient and family are responsible for reporting events to the DMV,  instructions were provided. The patient verbalized understanding and states she has not been driving. Other seizure precautions were discussed at length, including no diving, no skydiving, no unsupervised swimming, no Jacuzzi or bathing in bathtubs.    Pt agrees with plan.

## 2018-10-18 ENCOUNTER — APPOINTMENT (OUTPATIENT)
Dept: RADIOLOGY | Facility: MEDICAL CENTER | Age: 38
End: 2018-10-18
Attending: EMERGENCY MEDICINE
Payer: MEDICARE

## 2018-10-18 ENCOUNTER — HOSPITAL ENCOUNTER (EMERGENCY)
Facility: MEDICAL CENTER | Age: 38
End: 2018-10-19
Attending: EMERGENCY MEDICINE
Payer: MEDICARE

## 2018-10-18 VITALS
SYSTOLIC BLOOD PRESSURE: 121 MMHG | WEIGHT: 190.26 LBS | TEMPERATURE: 98.8 F | DIASTOLIC BLOOD PRESSURE: 77 MMHG | BODY MASS INDEX: 29.8 KG/M2 | HEART RATE: 79 BPM | OXYGEN SATURATION: 96 % | RESPIRATION RATE: 16 BRPM

## 2018-10-18 DIAGNOSIS — J02.0 STREP PHARYNGITIS: ICD-10-CM

## 2018-10-18 LAB
S PYO AG THROAT QL: ABNORMAL
SIGNIFICANT IND 70042: ABNORMAL
SITE SITE: ABNORMAL
SOURCE SOURCE: ABNORMAL

## 2018-10-18 PROCEDURE — 700102 HCHG RX REV CODE 250 W/ 637 OVERRIDE(OP): Performed by: EMERGENCY MEDICINE

## 2018-10-18 PROCEDURE — 700111 HCHG RX REV CODE 636 W/ 250 OVERRIDE (IP): Performed by: EMERGENCY MEDICINE

## 2018-10-18 PROCEDURE — 87880 STREP A ASSAY W/OPTIC: CPT

## 2018-10-18 PROCEDURE — A9270 NON-COVERED ITEM OR SERVICE: HCPCS | Performed by: EMERGENCY MEDICINE

## 2018-10-18 PROCEDURE — 71045 X-RAY EXAM CHEST 1 VIEW: CPT

## 2018-10-18 RX ORDER — ALBUTEROL SULFATE 90 UG/1
2 AEROSOL, METERED RESPIRATORY (INHALATION) ONCE
Status: COMPLETED | OUTPATIENT
Start: 2018-10-18 | End: 2018-10-18

## 2018-10-18 RX ORDER — DEXAMETHASONE SODIUM PHOSPHATE 10 MG/ML
10 INJECTION, SOLUTION INTRAMUSCULAR; INTRAVENOUS ONCE
Status: COMPLETED | OUTPATIENT
Start: 2018-10-18 | End: 2018-10-18

## 2018-10-18 RX ORDER — ALBUTEROL SULFATE 90 UG/1
2 AEROSOL, METERED RESPIRATORY (INHALATION) EVERY 6 HOURS PRN
Qty: 8.5 G | Refills: 0 | Status: SHIPPED | OUTPATIENT
Start: 2018-10-18 | End: 2020-08-28

## 2018-10-18 RX ORDER — ALBUTEROL SULFATE 90 UG/1
2 AEROSOL, METERED RESPIRATORY (INHALATION) EVERY 6 HOURS PRN
Qty: 8.5 G | Refills: 0 | Status: SHIPPED | OUTPATIENT
Start: 2018-10-18 | End: 2018-10-18

## 2018-10-18 RX ORDER — AMOXICILLIN AND CLAVULANATE POTASSIUM 875; 125 MG/1; MG/1
1 TABLET, FILM COATED ORAL 2 TIMES DAILY
Qty: 14 TAB | Refills: 0 | Status: SHIPPED | OUTPATIENT
Start: 2018-10-18 | End: 2018-10-25

## 2018-10-18 RX ADMIN — ALBUTEROL SULFATE 2 PUFF: 90 AEROSOL, METERED RESPIRATORY (INHALATION) at 23:41

## 2018-10-18 RX ADMIN — DEXAMETHASONE SODIUM PHOSPHATE 10 MG: 10 INJECTION INTRAMUSCULAR; INTRAVENOUS at 23:31

## 2018-10-18 ASSESSMENT — LIFESTYLE VARIABLES: DO YOU DRINK ALCOHOL: NO

## 2018-10-19 PROCEDURE — 99284 EMERGENCY DEPT VISIT MOD MDM: CPT

## 2018-10-19 ASSESSMENT — ENCOUNTER SYMPTOMS
VOMITING: 0
SHORTNESS OF BREATH: 1
SORE THROAT: 1
COUGH: 1
FEVER: 1
NAUSEA: 0
BACK PAIN: 0
DIZZINESS: 0
CHILLS: 0
ABDOMINAL PAIN: 0

## 2018-10-19 NOTE — ED NOTES
Pt clear for d/c. Educated on d/c instructions, verbalized understanding. Ambulated out of ED with family.

## 2018-10-19 NOTE — ED PROVIDER NOTES
ED Provider Note    Primary care provider: CRISTINO Chamberlain  Means of arrival: private vehicle  History obtained from: patient  History limited by: none    CHIEF COMPLAINT  Chief Complaint   Patient presents with   • Sore Throat     sob had sore throat, bronchitis, and coup recently    • Cough       HPI  Lyssa Leblanc is a 38 y.o. female who presents to the Emergency Department for sore throat and cough.  Patient reports that her young child was recently diagnosed with croup.  She developed similar symptoms of sore throat and cough over the last day and a half.  Patient reports that she is having a mild sore throat that she describes as a scratching in nature.  She is still able to tolerate oral intake without difficulty.  She reports associated cough that is dry and subjective fevers.  She denies nausea, vomiting, and abdominal pain.    REVIEW OF SYSTEMS  Review of Systems   Constitutional: Positive for fever (subjective). Negative for chills.   HENT: Positive for sore throat.    Respiratory: Positive for cough and shortness of breath.    Cardiovascular: Negative for chest pain.   Gastrointestinal: Negative for abdominal pain, nausea and vomiting.   Genitourinary: Negative for dysuria.   Musculoskeletal: Negative for back pain.   Skin: Negative for rash.   Neurological: Negative for dizziness.   All other systems reviewed and are negative.    PAST MEDICAL HISTORY   has a past medical history of Seizure (HCC) and Stroke (HCC).    SURGICAL HISTORY  patient denies any surgical history    SOCIAL HISTORY  Social History   Substance Use Topics   • Smoking status: Never Smoker   • Smokeless tobacco: Never Used   • Alcohol use No      History   Drug Use No       FAMILY HISTORY  Family History   Problem Relation Age of Onset   • Heart Disease Mother    • Diabetes Mother        CURRENT MEDICATIONS  Home Medications    None    ALLERGIES  No Known Allergies    PHYSICAL EXAM  VITAL SIGNS: /77   Pulse 79    Temp 37.1 °C (98.8 °F) (Temporal)   Resp 16   Wt 86.3 kg (190 lb 4.1 oz)   SpO2 96%   BMI 29.80 kg/m²   Vitals reviewed by myself.  Physical Exam   Constitutional: She is well-developed, well-nourished, and in no distress. No distress.   HENT:   Head: Normocephalic and atraumatic.   Mild erythema of the posterior oropharynx, no exudates, uvula is midline   Eyes: Pupils are equal, round, and reactive to light. EOM are normal.   Neck: Normal range of motion.   Cardiovascular: Normal rate, regular rhythm and normal heart sounds.  Exam reveals no gallop and no friction rub.    No murmur heard.  Pulmonary/Chest: Effort normal and breath sounds normal. No respiratory distress.   Abdominal: Soft. She exhibits no distension. There is no tenderness. There is no rebound.   Musculoskeletal: Normal range of motion.   Neurological: She is alert.   Skin: Skin is warm and dry.         DIAGNOSTIC STUDIES /  LABS  Labs Reviewed   RAPID STREP,CULT IF INDICATED - Abnormal; Notable for the following:        Result Value    Significant Indicator POS (*)     Rapid Strep Screen Positive for Group A streptococcus. (*)     All other components within normal limits       All labs reviewed by me.      RADIOLOGY  DX-CHEST-PORTABLE (1 VIEW)   Final Result         1.  No acute cardiopulmonary disease.        The radiologist's interpretation of all radiological studies have been reviewed by me.        COURSE & MEDICAL DECISION MAKING  Nursing notes, VS, PMSFHx reviewed in chart.    Patient is a 38-year-old female who comes in for sore throat.  Differential diagnosis includes viral syndrome, viral pharyngitis, strep pharyngitis, upper respiratory infection, pneumonia.  Diagnostic workup includes rapid strep and chest x-ray.    On initial assessment patient is well-appearing with vitals normal limits.  She is treated with dexamethasone and albuterol after which she feels improved.  Chest x-ray returns demonstrates no acute cardiopulmonary  processes.  Strep test is positive for strep pharyngitis.  Therefore patient is advised that she will be treated with outpatient Augmentin to which she is agreeable.  She felt improved with albuterol I will also provide her with prescription for albuterol.  Patient is then given strict return precautions and discharged home in stable condition.      FINAL IMPRESSION  1. Strep pharyngitis

## 2018-10-19 NOTE — ED TRIAGE NOTES
Chief Complaint   Patient presents with   • Sore Throat     sob had sore throat, bronchitis, and coup recently    • Cough

## 2018-11-21 ENCOUNTER — APPOINTMENT (OUTPATIENT)
Dept: NEUROLOGY | Facility: MEDICAL CENTER | Age: 38
End: 2018-11-21
Payer: MEDICARE

## 2019-01-02 ENCOUNTER — HOSPITAL ENCOUNTER (EMERGENCY)
Facility: MEDICAL CENTER | Age: 39
End: 2019-01-03
Attending: EMERGENCY MEDICINE
Payer: MEDICARE

## 2019-01-02 DIAGNOSIS — S96.911A STRAIN OF RIGHT ANKLE, INITIAL ENCOUNTER: ICD-10-CM

## 2019-01-02 PROCEDURE — 99284 EMERGENCY DEPT VISIT MOD MDM: CPT

## 2019-01-02 ASSESSMENT — PAIN SCALES - GENERAL: PAINLEVEL_OUTOF10: 10

## 2019-01-03 ENCOUNTER — APPOINTMENT (OUTPATIENT)
Dept: RADIOLOGY | Facility: MEDICAL CENTER | Age: 39
End: 2019-01-03
Attending: EMERGENCY MEDICINE
Payer: MEDICARE

## 2019-01-03 VITALS
TEMPERATURE: 97.7 F | HEART RATE: 96 BPM | OXYGEN SATURATION: 96 % | DIASTOLIC BLOOD PRESSURE: 72 MMHG | BODY MASS INDEX: 29.1 KG/M2 | SYSTOLIC BLOOD PRESSURE: 113 MMHG | WEIGHT: 185.41 LBS | RESPIRATION RATE: 18 BRPM | HEIGHT: 67 IN

## 2019-01-03 PROCEDURE — 73610 X-RAY EXAM OF ANKLE: CPT | Mod: RT

## 2019-01-03 RX ORDER — IBUPROFEN 800 MG/1
800 TABLET ORAL EVERY 8 HOURS PRN
Qty: 30 TAB | Refills: 0 | Status: SHIPPED
Start: 2019-01-03 | End: 2020-08-28

## 2019-01-03 NOTE — ED NOTES
Pt ambulated from waiting room with limp for right foot. Agree with triage note, chart up for ERP eval

## 2019-01-03 NOTE — ED TRIAGE NOTES
"Chief Complaint   Patient presents with   • Foot Swelling     Pt ambulatory with limp to triage with above complaint.  Pt states swelling in her RIGHT foot.  Pt state pain associated with swelling.  Pt denies any trauma to foot.  Pt states weight bearing is painful.  Pt states numbness in foot.  +pedal pulse and cap refill in foot.      Pt returned to lobby, educated on triage process, and to inform staff of any changes or concerns.    Blood pressure 109/70, pulse (!) 118, temperature 36.5 °C (97.7 °F), temperature source Temporal, resp. rate 18, height 1.702 m (5' 7\"), weight 84.1 kg (185 lb 6.5 oz), SpO2 93 %.      "

## 2019-01-03 NOTE — ED PROVIDER NOTES
"ED Provider Note    CHIEF COMPLAINT  Chief Complaint   Patient presents with   • Foot Swelling       HPI  Lyssa Leblanc is a 38 y.o. female here for evaluation of right ankle swelling.  The patient states that her ankle surgery swell a few days ago, and she is not sure as to whether or not she did anything in particular to the ankle.  She states that she may have twisted the ankle but is not sure.  She also then states that she made step off of a curb wrong, and then injured ankle.  The patient denies any history of the same, denies any history of gout, and denies any fever or vomiting.  She has not taken anything for the pain prior to arrival, and walking on it exacerbates her pain, while remaining still alleviates this pain.  She is here with her friend, and has no other medical complaints at this time.  She has no calf pain, no redness, she has no chest pain or shortness of breath.    PAST MEDICAL HISTORY   has a past medical history of Asthma; Seizure (HCC); and Stroke (HCC).    SOCIAL HISTORY  Social History     Social History Main Topics   • Smoking status: Never Smoker   • Smokeless tobacco: Never Used   • Alcohol use No   • Drug use: No   • Sexual activity: Not on file       SURGICAL HISTORY  patient denies any surgical history    CURRENT MEDICATIONS  Home Medications     Reviewed by Favian Ordoñez R.N. (Registered Nurse) on 01/02/19 at 2153  Med List Status: Not Addressed   Medication Last Dose Status   albuterol 108 (90 Base) MCG/ACT Aero Soln inhalation aerosol  Active   carBAMazepine SR (TEGRETOL XR) 400 MG TABLET SR 12 HR  Active   divalproex (DEPAKOTE) 250 MG Tablet Delayed Response  Active                ALLERGIES  No Known Allergies    REVIEW OF SYSTEMS  See HPI for further details. Review of systems as above, otherwise all other systems are negative.     PHYSICAL EXAM  VITAL SIGNS: /70   Pulse (!) 118   Temp 36.5 °C (97.7 °F) (Temporal)   Resp 18   Ht 1.702 m (5' 7\")   Wt 84.1 " kg (185 lb 6.5 oz)   SpO2 93%   BMI 29.04 kg/m²     Constitutional: Well developed, well nourished. No acute distress.  HEENT: Normocephalic, atraumatic. MMM  Neck: Supple, Full range of motion   Chest/Pulmonary:  No respiratory distress.  Equal expansion   Musculoskeletal: No deformity, neurovascular intact.   Right lower extremity; tenderness to the right lateral malleolus, neurovascular intact distally.  DPP present bilaterally.  No erythema noted.  No crepitus.  Nontender right knee.  Nontender right toes.  Mild edema to the lateral aspect of the ankle.  No calf tenderness.  Neuro: Clear speech, appropriate, cooperative, cranial nerves II-XII grossly intact.  Psych: Normal mood and affect    DX-ANKLE 3+ VIEWS RIGHT   Final Result      Normal ankle series.          PROCEDURES     MEDICAL RECORD  I have reviewed patient's medical record and pertinent results are listed above.    COURSE & MEDICAL DECISION MAKING  I have reviewed any medical record information, laboratory studies and radiographic results as noted above.    2:07 AM  The pt has a negative ankle series.  She will be placed in an aircast, and will use over the counter motrin.  She will return here for any further issues or concerns.     I you have had any blood pressure issues while here in the emergency department, please see your doctor for a further evaluation or work up.    Differential diagnoses include but not limited to: trauma, gout, septic joint/arthiritis, fracture     This patient presents with a right ankle strain .  At this time, I have counseled the patient/family regarding their medications, pain control, and follow up.  They will continue their medications, if any, as prescribed.  They will return immediately for any worsening symptoms and/or any other medical concerns.  They will see their doctor, or contact the doctor provided, in 1-2 days for follow up.       FINAL IMPRESSION  Right ankle strain       Electronically signed by: Po  ILIANA Jarvis, 1/3/2019 1:08 AM

## 2019-01-06 ENCOUNTER — OFFICE VISIT (OUTPATIENT)
Dept: URGENT CARE | Facility: CLINIC | Age: 39
End: 2019-01-06
Payer: MEDICARE

## 2019-01-06 VITALS
SYSTOLIC BLOOD PRESSURE: 120 MMHG | HEART RATE: 80 BPM | RESPIRATION RATE: 16 BRPM | TEMPERATURE: 99.4 F | OXYGEN SATURATION: 97 % | DIASTOLIC BLOOD PRESSURE: 80 MMHG

## 2019-01-06 DIAGNOSIS — K08.89 PAIN, DENTAL: ICD-10-CM

## 2019-01-06 PROCEDURE — 99214 OFFICE O/P EST MOD 30 MIN: CPT | Performed by: FAMILY MEDICINE

## 2019-01-06 RX ORDER — HYDROCODONE BITARTRATE AND ACETAMINOPHEN 7.5; 325 MG/1; MG/1
1 TABLET ORAL EVERY 8 HOURS PRN
Qty: 21 TAB | Refills: 0 | Status: SHIPPED | OUTPATIENT
Start: 2019-01-06 | End: 2019-01-13

## 2019-01-06 RX ORDER — AMOXICILLIN 875 MG/1
875 TABLET, COATED ORAL EVERY 12 HOURS
Qty: 20 TAB | Refills: 0 | Status: SHIPPED | OUTPATIENT
Start: 2019-01-06 | End: 2019-01-16

## 2019-01-06 ASSESSMENT — ENCOUNTER SYMPTOMS
ORTHOPNEA: 0
FOCAL WEAKNESS: 0
FEVER: 0
DIZZINESS: 0
HEMOPTYSIS: 0
CHILLS: 0
SHORTNESS OF BREATH: 0

## 2019-01-07 NOTE — PROGRESS NOTES
Subjective:      Lyssa Leblanc is a 38 y.o. female who presents with Dental Pain (x2 months, pain in left side of mouth when she drinks something cold or has something sweet, had her wisdom teeth on left side removed)      - This is a very pleasant, well and non-toxic appearing 38 y.o. female with complaints of on/off dental pain x 8 wks. No trauma or NVFC. More constant past week and keeping her up at night and worse w/ eating or hot/cold           ALLERGIES:  Patient has no known allergies.     PMH:  Past Medical History:   Diagnosis Date   • Asthma    • Seizure (HCC)    • Stroke (HCC)     as an infant        MEDS:    Current Outpatient Prescriptions:   •  HYDROcodone-acetaminophen (NORCO) 7.5-325 MG per tablet, Take 1 Tab by mouth every 8 hours as needed for up to 7 days., Disp: 21 Tab, Rfl: 0  •  amoxicillin (AMOXIL) 875 MG tablet, Take 1 Tab by mouth every 12 hours for 10 days., Disp: 20 Tab, Rfl: 0  •  albuterol 108 (90 Base) MCG/ACT Aero Soln inhalation aerosol, Inhale 2 Puffs by mouth every 6 hours as needed for Shortness of Breath., Disp: 8.5 g, Rfl: 0  •  divalproex (DEPAKOTE) 250 MG Tablet Delayed Response, Take 5 tabs PO BID.  Name Brand Medically Necessary., Disp: 300 Tab, Rfl: 1  •  carBAMazepine SR (TEGRETOL XR) 400 MG TABLET SR 12 HR, Take 1 Tab by mouth 2 times a day. Name Brand Medically Necessary., Disp: 60 Tab, Rfl: 1  •  ibuprofen (MOTRIN) 800 MG Tab, Take 1 Tab by mouth every 8 hours as needed., Disp: 30 Tab, Rfl: 0    ** I have documented what I find to be significant in regards to past medical, social, family and surgical history  in my HPI or under PMH/PSH/FH review section, otherwise it is contributory **           HPI    Review of Systems   Constitutional: Negative for chills and fever.   Respiratory: Negative for hemoptysis and shortness of breath.    Cardiovascular: Negative for chest pain and orthopnea.   Neurological: Negative for dizziness and focal weakness.          Objective:        /80   Pulse 80   Temp 37.4 °C (99.4 °F)   Resp 16   SpO2 97%      Physical Exam   Constitutional: She appears well-developed. No distress.   HENT:   Head: Normocephalic and atraumatic.   Mouth/Throat:       Cardiovascular: Regular rhythm.    No murmur heard.  Pulmonary/Chest: Effort normal. No respiratory distress.   Neurological: She is alert. She exhibits normal muscle tone.   Skin: Skin is warm and dry.   Psychiatric: She has a normal mood and affect. Judgment normal.   Nursing note and vitals reviewed.              Assessment/Plan:         1. Pain, dental  HYDROcodone-acetaminophen (NORCO) 7.5-325 MG per tablet    amoxicillin (AMOXIL) 875 MG tablet    Consent for Opiate Prescription             Dx & d/c instructions discussed w/ patient and/or family members.     ER precautions (worsening signs symptoms and when to go to ER) discussed.    Follow up w/ PCP in 2-3 days to make sure symptoms improving and no further intervention/treatment and/or work-up needed was advised, ER if feeling worse or not improving in 2 days.    Possible side effects (i.e. Rash, GI upset/constipation, sedation, elevation of BP or sugars) of any medications given discussed.     Patient left in stable condition

## 2019-01-18 ENCOUNTER — OFFICE VISIT (OUTPATIENT)
Dept: URGENT CARE | Facility: CLINIC | Age: 39
End: 2019-01-18
Payer: MEDICARE

## 2019-01-18 VITALS
BODY MASS INDEX: 29.35 KG/M2 | HEIGHT: 67 IN | WEIGHT: 187 LBS | OXYGEN SATURATION: 98 % | HEART RATE: 69 BPM | TEMPERATURE: 98.1 F | RESPIRATION RATE: 16 BRPM | SYSTOLIC BLOOD PRESSURE: 100 MMHG | DIASTOLIC BLOOD PRESSURE: 70 MMHG

## 2019-01-18 DIAGNOSIS — M25.571 ACUTE RIGHT ANKLE PAIN: ICD-10-CM

## 2019-01-18 PROCEDURE — 99213 OFFICE O/P EST LOW 20 MIN: CPT | Performed by: NURSE PRACTITIONER

## 2019-01-18 ASSESSMENT — ENCOUNTER SYMPTOMS
FEVER: 0
SENSORY CHANGE: 0
TINGLING: 0
CHILLS: 0
MYALGIAS: 0

## 2019-01-19 NOTE — PROGRESS NOTES
"Subjective:      Lyssa Leblanc is a 38 y.o. female who presents with Ankle Pain (right foot swollen, the brace was not working that it got worse and swollen, painful, hard to move it around and walking, seating is painful x  1 1/2 week )            HPI New problem. 38 year old female with possible ankle injury for 1.5 weeks. She was initially seen in ED and diagnosed with sprain on 1/2. She was given x-ray and aircast. She presents today with continued swelling and pain related to this incident. States aircast is not working. She is not icing or elevating. Taking ibuprofen. States ankle has been swollen \"my whole life\".  Patient has no known allergies.  Current Outpatient Prescriptions on File Prior to Visit   Medication Sig Dispense Refill   • albuterol 108 (90 Base) MCG/ACT Aero Soln inhalation aerosol Inhale 2 Puffs by mouth every 6 hours as needed for Shortness of Breath. 8.5 g 0   • divalproex (DEPAKOTE) 250 MG Tablet Delayed Response Take 5 tabs PO BID.  Name Brand Medically Necessary. 300 Tab 1   • carBAMazepine SR (TEGRETOL XR) 400 MG TABLET SR 12 HR Take 1 Tab by mouth 2 times a day. Name Brand Medically Necessary. 60 Tab 1   • ibuprofen (MOTRIN) 800 MG Tab Take 1 Tab by mouth every 8 hours as needed. 30 Tab 0     No current facility-administered medications on file prior to visit.      Social History     Social History   • Marital status:      Spouse name: N/A   • Number of children: N/A   • Years of education: N/A     Occupational History   • Not on file.     Social History Main Topics   • Smoking status: Never Smoker   • Smokeless tobacco: Never Used   • Alcohol use No   • Drug use: No   • Sexual activity: Not on file     Other Topics Concern   • Not on file     Social History Narrative   • No narrative on file     family history includes Diabetes in her mother; Heart Disease in her mother.      Review of Systems   Constitutional: Negative for chills and fever.   Musculoskeletal: Positive for " "joint pain. Negative for myalgias.   Skin: Negative for rash.   Neurological: Negative for tingling and sensory change.          Objective:     /70   Pulse 69   Temp 36.7 °C (98.1 °F) (Temporal)   Resp 16   Ht 1.702 m (5' 7\")   Wt 84.8 kg (187 lb)   SpO2 98%   BMI 29.29 kg/m²      Physical Exam   Constitutional: She is oriented to person, place, and time. She appears well-developed and well-nourished. No distress.   Cardiovascular: Normal rate, regular rhythm and normal heart sounds.    No murmur heard.  Pulmonary/Chest: Effort normal and breath sounds normal.   Musculoskeletal:   Mildly swollen right ankle. Cannot move due to prior stroke.     Neurological: She is alert and oriented to person, place, and time.   Skin: Skin is warm and dry. No erythema. No pallor.   Psychiatric: She has a normal mood and affect. Her behavior is normal. Thought content normal.   Nursing note and vitals reviewed.              Assessment/Plan:     1. Acute right ankle pain  REFERRAL TO ORTHOPEDICS      placed in boot.  Referral to ortho.  Reviewed importance of icing and elevation.  Differential diagnosis, natural history, supportive care, and indications for immediate follow-up discussed at length.     "

## 2019-04-01 ENCOUNTER — NON-PROVIDER VISIT (OUTPATIENT)
Dept: NEUROLOGY | Facility: MEDICAL CENTER | Age: 39
End: 2019-04-01
Payer: MEDICARE

## 2019-04-01 DIAGNOSIS — G40.909 SEIZURE DISORDER (HCC): ICD-10-CM

## 2019-04-01 PROCEDURE — 95951 EEG: CPT | Mod: 52 | Performed by: PSYCHIATRY & NEUROLOGY

## 2019-04-01 RX ORDER — CARBAMAZEPINE 400 MG/1
400 TABLET, EXTENDED RELEASE ORAL 2 TIMES DAILY
Qty: 60 TAB | Refills: 1 | Status: SHIPPED | OUTPATIENT
Start: 2019-04-01 | End: 2019-05-11 | Stop reason: SDUPTHER

## 2019-04-01 RX ORDER — DIVALPROEX SODIUM 250 MG/1
TABLET, DELAYED RELEASE ORAL
Qty: 300 TAB | Refills: 1 | Status: SHIPPED | OUTPATIENT
Start: 2019-04-01 | End: 2019-06-26 | Stop reason: SDUPTHER

## 2019-04-01 NOTE — PROCEDURES
VIDEO ELECTROENCEPHALOGRAM REPORT      Referring provider: DANIEL Calvo.     DOS:  4/1/2019 (total recording of 32 minutes).     INDICATION:  Lyssa Leblanc 38 y.o. female presenting with seizures.     CURRENT ANTIEPILEPTIC REGIMEN: Carbamazepine, Valproic Acid.     TECHNIQUE: 30 channel video electroencephalogram (EEG) was performed in accordance with the international 10-20 system. The study was reviewed in bipolar and referential montages. The recording examined the patient during wakeful state.     DESCRIPTION OF THE RECORD:  During the wakefulness, the background showed a symmetrical 10 Hz alpha activity posteriorly with amplitude of 70 mV.  There was reactivity to eye closure/opening.  A normal anterior-posterior gradient was noted with faster beta frequencies seen anteriorly.     ACTIVATION PROCEDURES:   Hyperventilation was performed by the patient for a total of 3 minutes. The technician performing the test noted good effort. This technique failed to produce any significant electroencephalographic changes.     Intermittent Photic stimulation was performed in a stepwise fashion from 1 to 30 Hz and elicited a normal response (photic driving), most noticeable in the posterior leads.       ICTAL AND/OR INTERICTAL FINDINGS:   There is slowing and attenuation of the left cerebral hemisphere.  No clinical events or seizures were reported or recorded during the study.     EKG: sampling of the EKG recording demonstrated sinus rhythm.     EVENTS:  None.     INTERPRETATION:  This is an abnormal video EEG recording in the awake state.  There is slowing and attenuation in the left cerebral hemisphere, likely suggestive of an underlying structural abnormality. No seizures captured during this study.  Clinical and radiological correlation is recommended.    Note: This EEG does not rule out epilepsy.  If the clinical suspicion remains high for seizures, a prolonged recording to capture clinical or subclinical  events may be helpful.        Christiano East MD   Epilepsy and Neurodiagnostics.   Clinical  of Neurology [facility].   Diplomate in Neurology, Epilepsy, and Electrodiagnostic Medicine.   Office: [phone]  Fax: [phone]

## 2019-04-08 ENCOUNTER — PATIENT MESSAGE (OUTPATIENT)
Dept: NEUROLOGY | Facility: MEDICAL CENTER | Age: 39
End: 2019-04-08

## 2019-04-08 ENCOUNTER — TELEPHONE (OUTPATIENT)
Dept: NEUROLOGY | Facility: MEDICAL CENTER | Age: 39
End: 2019-04-08

## 2019-04-08 NOTE — TELEPHONE ENCOUNTER
Have been playing phone tag with pt, VM is not set up on phone. Need to do PA for pts Depakote and Tegretol for brand name. No rx information on file. Sent message via "[employer].

## 2019-06-26 ENCOUNTER — OFFICE VISIT (OUTPATIENT)
Dept: NEUROLOGY | Facility: MEDICAL CENTER | Age: 39
End: 2019-06-26
Payer: MEDICARE

## 2019-06-26 VITALS
BODY MASS INDEX: 30.29 KG/M2 | OXYGEN SATURATION: 95 % | TEMPERATURE: 96.5 F | HEART RATE: 86 BPM | RESPIRATION RATE: 16 BRPM | SYSTOLIC BLOOD PRESSURE: 110 MMHG | WEIGHT: 193 LBS | HEIGHT: 67 IN | DIASTOLIC BLOOD PRESSURE: 70 MMHG

## 2019-06-26 DIAGNOSIS — G81.11 RIGHT SPASTIC HEMIPLEGIA (HCC): ICD-10-CM

## 2019-06-26 DIAGNOSIS — F81.9 LEARNING DISABILITY: ICD-10-CM

## 2019-06-26 DIAGNOSIS — G40.909 SEIZURE DISORDER (HCC): ICD-10-CM

## 2019-06-26 PROCEDURE — 99214 OFFICE O/P EST MOD 30 MIN: CPT | Performed by: NURSE PRACTITIONER

## 2019-06-26 RX ORDER — CARBAMAZEPINE 400 MG/1
TABLET, EXTENDED RELEASE ORAL
Qty: 180 TAB | Refills: 3 | Status: SHIPPED | OUTPATIENT
Start: 2019-06-26 | End: 2020-01-21 | Stop reason: SDUPTHER

## 2019-06-26 RX ORDER — DIVALPROEX SODIUM 250 MG/1
TABLET, DELAYED RELEASE ORAL
Qty: 300 TAB | Refills: 3 | Status: SHIPPED | OUTPATIENT
Start: 2019-06-26 | End: 2020-01-21 | Stop reason: SDUPTHER

## 2019-06-26 ASSESSMENT — ENCOUNTER SYMPTOMS
NAUSEA: 0
SEIZURES: 0
MUSCULOSKELETAL NEGATIVE: 1
DEPRESSION: 1
COUGH: 0
SORE THROAT: 0
DOUBLE VISION: 0
HEADACHES: 0
NERVOUS/ANXIOUS: 0
DIARRHEA: 0
CONSTITUTIONAL NEGATIVE: 1
VOMITING: 0
ABDOMINAL PAIN: 0

## 2019-06-26 NOTE — PROGRESS NOTES
Subjective:      Lyssa Leblanc is a 38 y.o. female who presents with Follow-Up (Seizure disorder)  Presents with her father and two children today.        HPI Last known seizure was during labor and delivery of last baby in 2014.     No concerns at this time today.     She is wondering about her supplements and what she should be taking.       8/2018 Osteopenia: Impression        According to the World Health Organization classification, bone mineral density of this patient is osteopenia with increased risk of fracture.      4/1/2019 EEG INTERPRETATION:  This is an abnormal video EEG recording in the awake state.    There is slowing and attenuation in the left cerebral hemisphere,   likely suggestive of an underlying structural abnormality. No   seizures captured during this study.  Clinical and radiological   correlation is recommended.         Ref. Range 5/26/2018 08:12   Carbamazepine, S Latest Ref Range: 4.0 - 12.0 ug/mL 6.5   Valproic Acid Latest Ref Range: 50 - 100 ug/mL 72   25-Hydroxy   Vitamin D 25 Latest Ref Range: 30.0 - 100.0 ng/mL 18.8 (L)      Current Outpatient Prescriptions   Medication Sig Dispense Refill   • divalproex (DEPAKOTE) 250 MG Tablet Delayed Response Take 5 tabs PO BID.  Name Brand Medically Necessary. 300 Tab 3   • TEGRETOL  MG TABLET SR 12 HR TAKE 1 TABLET BY MOUTH TWICE A DAY. Name Brand Medically Necessary. 180 Tab 3   • ibuprofen (MOTRIN) 800 MG Tab Take 1 Tab by mouth every 8 hours as needed. 30 Tab 0   • albuterol 108 (90 Base) MCG/ACT Aero Soln inhalation aerosol Inhale 2 Puffs by mouth every 6 hours as needed for Shortness of Breath. 8.5 g 0     No current facility-administered medications for this visit.        Review of Systems   Constitutional: Negative.  Weight loss: weight gain.   HENT: Positive for congestion. Negative for hearing loss, nosebleeds and sore throat.         No recent head injury.   Eyes: Negative for double vision.        No new loss of vision.  "  Respiratory: Negative for cough.         No recent lung infections.   Cardiovascular: Negative for chest pain.   Gastrointestinal: Negative for abdominal pain, diarrhea, nausea and vomiting.   Genitourinary: Negative.    Musculoskeletal: Negative.    Skin: Negative.    Neurological: Negative for seizures and headaches.   Endo/Heme/Allergies:        No history of endocrine dysfunction.  No new problems.   Psychiatric/Behavioral: Positive for depression. The patient is not nervous/anxious.         No recent mood changes.          Objective:     /70 (BP Location: Left arm, Patient Position: Sitting, BP Cuff Size: Adult)   Pulse 86   Temp 35.8 °C (96.5 °F) (Temporal)   Resp 16   Ht 1.702 m (5' 7\")   Wt 87.5 kg (193 lb)   SpO2 95%   BMI 30.23 kg/m²      Physical Exam   Constitutional: She is oriented to person, place, and time. She appears well-developed and well-nourished.   HENT:   Head: Normocephalic and atraumatic.   Eyes: Pupils are equal, round, and reactive to light.   Cardiovascular: Normal rate and regular rhythm.    Pulmonary/Chest: Effort normal.   Neurological: She is alert and oriented to person, place, and time. She displays abnormal reflex. She exhibits abnormal muscle tone. Coordination ( h/o right tendon release) abnormal. Gait normal.   Reflex Scores:       Tricep reflexes are 3+ on the left side.       Bicep reflexes are 3+ on the left side.       Brachioradialis reflexes are 3+ on the left side.  No observable changes in neurologic status.  See initial new patient examination for details.    Skin: Skin is warm. There is pallor.   Psychiatric:   Learning delayed, outspoken today               Assessment/Plan:       Seizure Disorder, s/p  intracranial hemorrhage:  History of seizures since birth.  Last known seizure was during labor and delivery in .     No Brain MRI on record.     Continue Depakote 1250mg BID and Tegretol XR 400mg BID.     Discussed potential side effects of " Depakote and Tegretol.  I would encourage the option of transitioning off or Tegretol in the near future.     2019 Reviewed EEG: slowing and attenuation in the left cerebral hemisphere.      Needs to be taking PNV, calcium 2000mg and Vit D3 2000IU per day.  She is not taking supplementation at this time.     Return for follow-up in 6 months.     EDUCATION AND COUNSELING:  -Education was provided to the patient and/or family regarding diagnosis and prognosis. The chronic and unpredictable nature of the condition was discussed. There is increased risk for additional events, which may carry potential for significant injuries and death.    -We reviewed the current antiepileptic regimen. Potential side effects of antiepileptics were discussed at length, including but no limited to: hypersensitivity reactions (rash and others, some of which can be fatal), visual field changes (some of which may be irreversible), glaucoma, diplopia, kidney stones, osteopenia/osteoporosis/bone fractures, hyperthermia/anhydrosis, tremors, ataxia, dizziness, fatigue, increased risk for falls, cardiac arrhythmias/syncope, gastrointestinal (hepatitis, pancreatitis, gastritis, ulcers), gingival hypertrophy, drowsiness, sedation, anxiety/nervousness, increased risk for suicide, increased risk for depression, and psychosis. We reviewed drug-drug interactions and their potential effect on seizure control and medication side effects.    -Patient/family educated on SUDEP (Sudden Death in Epilepsy). Counseling was provided on the importance of strict medication and follow up compliance. The patient understands the risks associated with non-adherence with the medical plan as outlined, including but not limited to an increased risk for breakthrough seizures, which may contribute to injuries, disability, status epilepticus, and even death.    -Counseling was also provided on potential effects of alcohol and other drugs, which may lower seizure threshold  and/or affect the metabolism of antiepileptic drugs. I recommend avoidance of alcohol and illegal drugs.  -Recommend proper hydration, regular exercise, proper sleep hygiene (7-8 hrs of overnight sleep, avoid sleep deprivation).    -I have made the patient aware of mandatory reporting required by the law in the State of [place] regarding episodes of seizures, loss of consciousness, and/or alteration of awareness. The patient and family are responsible for reporting events to the DMV, instructions were provided. The patient verbalized understanding and states she has not been driving. Other seizure precautions were discussed at length, including no diving, no skydiving, no unsupervised swimming, no Jacuzzi or bathing in bathtubs.    Pt agrees with plan.

## 2019-08-27 LAB
25(OH)D3+25(OH)D2 SERPL-MCNC: 26.1 NG/ML (ref 30–100)
ALBUMIN SERPL-MCNC: 3.9 G/DL (ref 3.5–5.5)
ALBUMIN/GLOB SERPL: 1.6 {RATIO} (ref 1.2–2.2)
ALP SERPL-CCNC: 38 IU/L (ref 39–117)
ALT SERPL-CCNC: 9 IU/L (ref 0–32)
AST SERPL-CCNC: 9 IU/L (ref 0–40)
BASOPHILS # BLD AUTO: 0 X10E3/UL (ref 0–0.2)
BASOPHILS NFR BLD AUTO: 0 %
BILIRUB SERPL-MCNC: <0.2 MG/DL (ref 0–1.2)
BUN SERPL-MCNC: 7 MG/DL (ref 6–20)
BUN/CREAT SERPL: 12 (ref 9–23)
CALCIUM SERPL-MCNC: 8.7 MG/DL (ref 8.7–10.2)
CARBAMAZEPINE SERPL-MCNC: 6.6 UG/ML (ref 4–12)
CHLORIDE SERPL-SCNC: 104 MMOL/L (ref 96–106)
CO2 SERPL-SCNC: 20 MMOL/L (ref 20–29)
CREAT SERPL-MCNC: 0.6 MG/DL (ref 0.57–1)
EOSINOPHIL # BLD AUTO: 0.1 X10E3/UL (ref 0–0.4)
EOSINOPHIL NFR BLD AUTO: 2 %
ERYTHROCYTE [DISTWIDTH] IN BLOOD BY AUTOMATED COUNT: 13.3 % (ref 12.3–15.4)
GLOBULIN SER CALC-MCNC: 2.5 G/DL (ref 1.5–4.5)
GLUCOSE SERPL-MCNC: 85 MG/DL (ref 65–99)
HCT VFR BLD AUTO: 34.9 % (ref 34–46.6)
HGB BLD-MCNC: 11.6 G/DL (ref 11.1–15.9)
IMM GRANULOCYTES # BLD AUTO: 0 X10E3/UL (ref 0–0.1)
IMM GRANULOCYTES NFR BLD AUTO: 0 %
IMMATURE CELLS  115398: NORMAL
LYMPHOCYTES # BLD AUTO: 1.8 X10E3/UL (ref 0.7–3.1)
LYMPHOCYTES NFR BLD AUTO: 42 %
MCH RBC QN AUTO: 29.6 PG (ref 26.6–33)
MCHC RBC AUTO-ENTMCNC: 33.2 G/DL (ref 31.5–35.7)
MCV RBC AUTO: 89 FL (ref 79–97)
MONOCYTES # BLD AUTO: 0.3 X10E3/UL (ref 0.1–0.9)
MONOCYTES NFR BLD AUTO: 7 %
MORPHOLOGY BLD-IMP: NORMAL
NEUTROPHILS # BLD AUTO: 2.1 X10E3/UL (ref 1.4–7)
NEUTROPHILS NFR BLD AUTO: 49 %
NRBC BLD AUTO-RTO: NORMAL %
PLATELET # BLD AUTO: 160 X10E3/UL (ref 150–450)
POTASSIUM SERPL-SCNC: 4.3 MMOL/L (ref 3.5–5.2)
PROT SERPL-MCNC: 6.4 G/DL (ref 6–8.5)
RBC # BLD AUTO: 3.92 X10E6/UL (ref 3.77–5.28)
SODIUM SERPL-SCNC: 138 MMOL/L (ref 134–144)
VALPROATE SERPL-MCNC: 80 UG/ML (ref 50–100)
WBC # BLD AUTO: 4.2 X10E3/UL (ref 3.4–10.8)

## 2019-10-02 ENCOUNTER — APPOINTMENT (OUTPATIENT)
Dept: RADIOLOGY | Facility: MEDICAL CENTER | Age: 39
End: 2019-10-02
Attending: EMERGENCY MEDICINE
Payer: MEDICARE

## 2019-10-02 ENCOUNTER — HOSPITAL ENCOUNTER (EMERGENCY)
Facility: MEDICAL CENTER | Age: 39
End: 2019-10-02
Attending: EMERGENCY MEDICINE
Payer: MEDICARE

## 2019-10-02 VITALS
TEMPERATURE: 98.1 F | HEART RATE: 81 BPM | HEIGHT: 67 IN | SYSTOLIC BLOOD PRESSURE: 114 MMHG | RESPIRATION RATE: 16 BRPM | DIASTOLIC BLOOD PRESSURE: 75 MMHG | WEIGHT: 191.8 LBS | BODY MASS INDEX: 30.1 KG/M2 | OXYGEN SATURATION: 95 %

## 2019-10-02 DIAGNOSIS — S60.222A CONTUSION OF LEFT HAND, INITIAL ENCOUNTER: ICD-10-CM

## 2019-10-02 PROCEDURE — 99283 EMERGENCY DEPT VISIT LOW MDM: CPT

## 2019-10-02 PROCEDURE — 73110 X-RAY EXAM OF WRIST: CPT | Mod: LT

## 2019-10-02 NOTE — ED TRIAGE NOTES
Chief Complaint   Patient presents with   • T-5000 Extremity Pain     this morning struck wall with left forearm     CMS intact. Explained triage process, to waiting room. Asked to inform RN if questions or concerns arise.

## 2019-10-03 NOTE — ED PROVIDER NOTES
ED Provider Note    Scribed for Shiva Singh D.O. by Cristbóal Easley. 10/2/2019  5:05 PM    Primary care provider: CRISTINO Chamberlain  Means of arrival: Walk In  History obtained from: Patient  History limited by: None    CHIEF COMPLAINT  Chief Complaint   Patient presents with   • T-5000 Extremity Pain     this morning struck wall with left forearm       HPI  Lyssa Leblanc is a 39 y.o. female who presents to the Emergency Department for left wrist pain onset earlier this morning. Patient reports that she struck a wall hard with her left forearm in a hammer motion, and this immediately caused her pain. She does not note why she struck the wall. This caused the patient to have pain over her left wrist, but she denies any pain of the fingers or forearm at this time. She notes that the pain is mostly over the base of the left wrist.    REVIEW OF SYSTEMS  Pertinent positives include: left wrist pain   Pertinent negatives include: no finger or forearm pain  See HPI for further details.    ALLERGIES  No Known Allergies    CURRENT MEDICATIONS  Home Medications     Reviewed by Surinder Mallory R.N. (Registered Nurse) on 10/02/19 at 1613  Med List Status: <None>   Medication Last Dose Status   albuterol 108 (90 Base) MCG/ACT Aero Soln inhalation aerosol  Active   divalproex (DEPAKOTE) 250 MG Tablet Delayed Response  Active   ibuprofen (MOTRIN) 800 MG Tab  Active   TEGRETOL  MG TABLET SR 12 HR  Active              PAST MEDICAL HISTORY   has a past medical history of Asthma, Seizure (HCC), and Stroke (HCC).    SURGICAL HISTORY  patient denies any surgical history    SOCIAL HISTORY  Social History     Tobacco Use   • Smoking status: Current Every Day Smoker     Packs/day: 0.20   • Smokeless tobacco: Never Used   Substance Use Topics   • Alcohol use: No   • Drug use: No      Social History     Substance and Sexual Activity   Drug Use No       FAMILY HISTORY  Family History   Problem Relation Age of Onset   • Heart  "Disease Mother    • Diabetes Mother          PHYSICAL EXAM  VITAL SIGNS: /68   Pulse 70   Temp 36.7 °C (98.1 °F) (Temporal)   Resp 16   Ht 1.702 m (5' 7\")   Wt 87 kg (191 lb 12.8 oz)   SpO2 97%   BMI 30.04 kg/m²   Constitutional: Well-nourished, Well developed. In mild distress.   Head: Normocephalic, Atraumatic  Eyes: PERRL, sclera anicteric, EOMI, no lid swelling  ENT: No nasal discharge or epistaxis. No facial deformity. Mucous membranes are moist.   Lungs: No respiratory distress.  Skin: Slight contusion of the left wrist with no open lesions.  Extremities: Left wrist has swelling and tenderness to the distal ulna. No major deformities noted. No tenderness over the left hand or fingers. No tenderness over the proximal forearm.  Psychiatric: Cooperative. Appropriate mood and affect.     DIAGNOSTIC STUDIES / PROCEDURES    RADIOLOGY  DX-WRIST-COMPLETE 3+ LEFT   Final Result      No evidence of acute fracture or dislocation.        Films read by Radiologist, and independently reviewed by myself.    COURSE & MEDICAL DECISION MAKING:  Nursing notes, VS, PMSFHx reviewed in chart.     5:05 PM - Patient seen and examined at bedside. Ordered DX-Wrist Complete Left to evaluate her symptoms.    5:53 PM - Patient was reevaluated at bedside. She is resting in bed comfortably with no new symptoms. Discussed radiology results with the patient and informed her that there appears to be no abnormal findings at this time. There appears no signs of fracture. Patient most likely has a contusion of the left hand. She has been instructed on any symptom alleviation and she will follow up with her PCP. Patient is safe for discharge. She is agreeable and understanding to discharge.     Discharge Vitals:    /75   Pulse 81   Temp 36.7 °C (98.1 °F) (Temporal)   Resp 16   Ht 1.702 m (5' 7\")   Wt 87 kg (191 lb 12.8 oz)   SpO2 95%   BMI 30.04 kg/m²        FINAL IMPRESSION:  1. Contusion of left hand, initial encounter  "        The patient will return for new or worsening symptoms and is stable at the time of discharge.      DISPOSITION:  Patient will be discharged home in stable condition.    FOLLOW UP:  [facility], Emergency Dept  [address]  [place] [address] [phone]    If symptoms worsen       Cristóbal BURDICK (Scribe), am scribing for, and in the presence of, Shiva Singh D.O..    Electronically signed by: Cristóbal Easley (Scribe), 10/2/2019    IShiva D.O. personally performed the services described in this documentation, as scribed by Cristóbal Easley in my presence, and it is both accurate and complete.    E    Please note that this dictation was created using voice recognition software. I have worked with consultants from the vendor as well as technical experts from [facility] to optimize the interface. I have made every reasonable attempt to correct obvious errors, but I expect that there are errors of grammar and possibly content that I did not discover before finalizing the note.

## 2019-10-03 NOTE — ED NOTES
Lyssa Leblanc discharged via ambulatory with steady gait.  Discharge instructions given and reviewed, patient educated to follow up with PCP, verbalized understanding.  Prescriptions given x 0.  All personal belongings in possession.  No questions at this time.

## 2019-12-30 ENCOUNTER — APPOINTMENT (OUTPATIENT)
Dept: NEUROLOGY | Facility: MEDICAL CENTER | Age: 39
End: 2019-12-30
Payer: MEDICARE

## 2020-01-21 ENCOUNTER — OFFICE VISIT (OUTPATIENT)
Dept: NEUROLOGY | Facility: MEDICAL CENTER | Age: 40
End: 2020-01-21
Payer: MEDICARE

## 2020-01-21 VITALS
HEIGHT: 67 IN | BODY MASS INDEX: 32.18 KG/M2 | RESPIRATION RATE: 16 BRPM | SYSTOLIC BLOOD PRESSURE: 112 MMHG | TEMPERATURE: 98.1 F | WEIGHT: 205 LBS | OXYGEN SATURATION: 98 % | HEART RATE: 65 BPM | DIASTOLIC BLOOD PRESSURE: 74 MMHG

## 2020-01-21 DIAGNOSIS — F81.9 LEARNING DISABILITY: ICD-10-CM

## 2020-01-21 DIAGNOSIS — M85.80 OSTEOPENIA, UNSPECIFIED LOCATION: ICD-10-CM

## 2020-01-21 DIAGNOSIS — G81.11 RIGHT SPASTIC HEMIPLEGIA (HCC): ICD-10-CM

## 2020-01-21 DIAGNOSIS — G40.909 SEIZURE DISORDER (HCC): ICD-10-CM

## 2020-01-21 PROCEDURE — 99214 OFFICE O/P EST MOD 30 MIN: CPT | Performed by: NURSE PRACTITIONER

## 2020-01-21 RX ORDER — CARBAMAZEPINE 400 MG/1
TABLET, EXTENDED RELEASE ORAL
Qty: 180 TAB | Refills: 1 | Status: SHIPPED | OUTPATIENT
Start: 2020-01-21 | End: 2020-01-21 | Stop reason: SDUPTHER

## 2020-01-21 RX ORDER — CARBAMAZEPINE 400 MG/1
TABLET, EXTENDED RELEASE ORAL
Qty: 180 TAB | Refills: 1 | Status: SHIPPED
Start: 2020-01-21 | End: 2020-08-03

## 2020-01-21 RX ORDER — HYDROXYZINE HYDROCHLORIDE 25 MG/1
50 TABLET, FILM COATED ORAL 2 TIMES DAILY
COMMUNITY
Start: 2019-12-26 | End: 2022-01-03

## 2020-01-21 RX ORDER — DIVALPROEX SODIUM 250 MG/1
TABLET, DELAYED RELEASE ORAL
Qty: 300 TAB | Refills: 1 | Status: SHIPPED
Start: 2020-01-21 | End: 2020-10-26 | Stop reason: SDUPTHER

## 2020-01-21 RX ORDER — DIVALPROEX SODIUM 250 MG/1
TABLET, DELAYED RELEASE ORAL
Qty: 300 TAB | Refills: 1 | Status: SHIPPED | OUTPATIENT
Start: 2020-01-21 | End: 2020-01-21 | Stop reason: SDUPTHER

## 2020-01-21 ASSESSMENT — ENCOUNTER SYMPTOMS
MUSCULOSKELETAL NEGATIVE: 1
VOMITING: 0
SORE THROAT: 0
DOUBLE VISION: 0
DIARRHEA: 0
ABDOMINAL PAIN: 0
HEADACHES: 0
NAUSEA: 0
NERVOUS/ANXIOUS: 0
COUGH: 0

## 2020-01-21 NOTE — PROGRESS NOTES
Subjective:      Lyssa Leblanc is a 39 y.o. female who presents with Follow-Up (Seizure disorder )          HPI Last known seizure was during labor and delivery of last baby in 2014.     No concerns at this time today.     She is wondering about her supplements and what she should be taking.      She has been told that her Depo shot is not working as much with her Tegretol.    8/2018 Osteopenia: Impression        According to the World Health Organization classification, bone mineral density of this patient is osteopenia with increased risk of fracture.      4/1/2019 EEG INTERPRETATION:  This is an abnormal video EEG recording in the awake state.    There is slowing and attenuation in the left cerebral hemisphere,   likely suggestive of an underlying structural abnormality. No   seizures captured during this study.  Clinical and radiological   correlation is recommended.       Ref. Range 8/26/2019 06:41   Carbamazepine, S Latest Ref Range: 4.0 - 12.0 ug/mL 6.6   Valproic Acid Latest Ref Range: 50 - 100 ug/mL 80   25-Hydroxy   Vitamin D 25 Latest Ref Range: 30.0 - 100.0 ng/mL 26.1 (L)       Newly prescribed Atarax today.    Current Outpatient Medications   Medication Sig Dispense Refill   • hydrOXYzine HCl (ATARAX) 25 MG Tab Take 1-2 Tabs by mouth 2 Times a Day.     • divalproex (DEPAKOTE) 250 MG Tablet Delayed Response Take 5 tabs PO BID.  Name Brand Medically Necessary. 300 Tab 3   • TEGRETOL  MG TABLET SR 12 HR TAKE 1 TABLET BY MOUTH TWICE A DAY. Name Brand Medically Necessary. 180 Tab 3   • ibuprofen (MOTRIN) 800 MG Tab Take 1 Tab by mouth every 8 hours as needed. 30 Tab 0   • albuterol 108 (90 Base) MCG/ACT Aero Soln inhalation aerosol Inhale 2 Puffs by mouth every 6 hours as needed for Shortness of Breath. 8.5 g 0     No current facility-administered medications for this visit.            Review of Systems   Constitutional: Negative.    HENT: Negative for hearing loss, nosebleeds and sore throat.        "  No recent head injury.   Eyes: Negative for double vision.        No new loss of vision.   Respiratory: Negative for cough.         No recent lung infections.   Cardiovascular: Negative for chest pain.   Gastrointestinal: Negative for abdominal pain, diarrhea, nausea and vomiting.   Genitourinary: Negative.    Musculoskeletal: Negative.    Skin: Negative.    Neurological: Negative for seizures and headaches.   Endo/Heme/Allergies:        No history of endocrine dysfunction.  No new problems.   Psychiatric/Behavioral: Positive for depression. The patient is not nervous/anxious.         No recent mood changes.          Objective:     /74 (BP Location: Left arm, Patient Position: Sitting, BP Cuff Size: Adult)   Pulse 65   Temp 36.7 °C (98.1 °F) (Temporal)   Resp 16   Ht 1.702 m (5' 7.01\")   Wt 93 kg (205 lb)   SpO2 98%   BMI 32.10 kg/m²      Physical Exam  Constitutional:       Appearance: She is well-developed.   HENT:      Head: Normocephalic and atraumatic.   Eyes:      Pupils: Pupils are equal, round, and reactive to light.   Neck:      Musculoskeletal: Normal range of motion.   Cardiovascular:      Rate and Rhythm: Normal rate and regular rhythm.   Pulmonary:      Effort: Pulmonary effort is normal.   Musculoskeletal: Normal range of motion.   Skin:     General: Skin is warm.   Neurological:      Mental Status: She is alert and oriented to person, place, and time.      Motor: Abnormal muscle tone present.      Coordination: Coordination abnormal ( h/o right tendon release).      Gait: Gait normal.      Deep Tendon Reflexes: Reflexes abnormal.      Reflex Scores:       Tricep reflexes are 3+ on the right side and 3+ on the left side.       Bicep reflexes are 3+ on the right side and 3+ on the left side.       Brachioradialis reflexes are 3+ on the right side and 3+ on the left side.     Comments: No observable changes in neurologic status.  See initial new patient examination for details.  "   Psychiatric:         Mood and Affect: Mood normal.      Comments: Learning delayed, outspoken today               Assessment/Plan:     Seizure Disorder, s/p  intracranial hemorrhage:  History of seizures since birth.  Last known seizure was during labor and delivery in .     No Brain MRI on record.     Continue Depakote 1250mg BID and Tegretol XR 400mg BID.  She is struggling to get name brand medication at the pharmacy so I have provided a printed Rx for her as well as a copy of what is sent electronically today.        Ref. Range 2019 06:41   Carbamazepine, S Latest Ref Range: 4.0 - 12.0 ug/mL 6.6   Valproic Acid Latest Ref Range: 50 - 100 ug/mL 80   25-Hydroxy   Vitamin D 25 Latest Ref Range: 30.0 - 100.0 ng/mL 26.1 (L)       Discussed potential side effects of Depakote and Tegretol.  I would encourage the option of transitioning off or Tegretol in the near future.     2019 Reviewed EEG: slowing and attenuation in the left cerebral hemisphere.      Osteopenia:  Needs to be taking PNV, calcium 2000mg and Vit D3 2000IU per day.  She is not taking supplementation at this time.  Due for a DEXA comparative study in .     Will need labs with the next appointment.    Return for follow-up in 6 months.     EDUCATION AND COUNSELING:  -Education was provided to the patient and/or family regarding diagnosis and prognosis. The chronic and unpredictable nature of the condition was discussed. There is increased risk for additional events, which may carry potential for significant injuries and death.    -We reviewed the current antiepileptic regimen. Potential side effects of antiepileptics were discussed at length, including but no limited to: hypersensitivity reactions (rash and others, some of which can be fatal), visual field changes (some of which may be irreversible), glaucoma, diplopia, kidney stones, osteopenia/osteoporosis/bone fractures, hyperthermia/anhydrosis, tremors, ataxia, dizziness,  fatigue, increased risk for falls, cardiac arrhythmias/syncope, gastrointestinal (hepatitis, pancreatitis, gastritis, ulcers), gingival hypertrophy, drowsiness, sedation, anxiety/nervousness, increased risk for suicide, increased risk for depression, and psychosis. We reviewed drug-drug interactions and their potential effect on seizure control and medication side effects.    -Patient/family educated on SUDEP (Sudden Death in Epilepsy). Counseling was provided on the importance of strict medication and follow up compliance. The patient understands the risks associated with non-adherence with the medical plan as outlined, including but not limited to an increased risk for breakthrough seizures, which may contribute to injuries, disability, status epilepticus, and even death.    -Counseling was also provided on potential effects of alcohol and other drugs, which may lower seizure threshold and/or affect the metabolism of antiepileptic drugs. I recommend avoidance of alcohol and illegal drugs.  -Recommend proper hydration, regular exercise, proper sleep hygiene (7-8 hrs of overnight sleep, avoid sleep deprivation).    -I have made the patient aware of mandatory reporting required by the law in the State of [place] regarding episodes of seizures, loss of consciousness, and/or alteration of awareness. The patient and family are responsible for reporting events to the DMV, instructions were provided. The patient verbalized understanding and states she has not been driving. Other seizure precautions were discussed at length, including no diving, no skydiving, no unsupervised swimming, no Jacuzzi or bathing in bathtubs.    Pt agrees with plan.

## 2020-01-22 PROBLEM — M85.80 OSTEOPENIA: Status: ACTIVE | Noted: 2020-01-22

## 2020-01-22 ASSESSMENT — ENCOUNTER SYMPTOMS
DEPRESSION: 1
SEIZURES: 0
CONSTITUTIONAL NEGATIVE: 1

## 2020-02-09 ENCOUNTER — OFFICE VISIT (OUTPATIENT)
Dept: URGENT CARE | Facility: CLINIC | Age: 40
End: 2020-02-09
Payer: MEDICARE

## 2020-02-09 VITALS
OXYGEN SATURATION: 97 % | HEART RATE: 100 BPM | HEIGHT: 67 IN | SYSTOLIC BLOOD PRESSURE: 114 MMHG | RESPIRATION RATE: 16 BRPM | DIASTOLIC BLOOD PRESSURE: 64 MMHG | WEIGHT: 202 LBS | TEMPERATURE: 98.3 F | BODY MASS INDEX: 31.71 KG/M2

## 2020-02-09 DIAGNOSIS — Z87.09 HISTORY OF ASTHMA: ICD-10-CM

## 2020-02-09 DIAGNOSIS — H65.191 OTHER NON-RECURRENT ACUTE NONSUPPURATIVE OTITIS MEDIA OF RIGHT EAR: ICD-10-CM

## 2020-02-09 DIAGNOSIS — R06.2 WHEEZING: ICD-10-CM

## 2020-02-09 PROBLEM — E78.5 HYPERLIPIDEMIA: Status: ACTIVE | Noted: 2018-04-24

## 2020-02-09 PROBLEM — F39 MOOD DISORDER (HCC): Status: ACTIVE | Noted: 2019-09-13

## 2020-02-09 PROBLEM — Z86.73 HISTORY OF STROKE: Status: ACTIVE | Noted: 2018-04-11

## 2020-02-09 PROBLEM — G47.00 INSOMNIA: Status: ACTIVE | Noted: 2019-09-13

## 2020-02-09 PROBLEM — Z82.49 FAMILY HISTORY OF CARDIOVASCULAR DISEASE: Status: ACTIVE | Noted: 2018-04-11

## 2020-02-09 PROBLEM — F41.9 ANXIETY: Status: ACTIVE | Noted: 2019-09-13

## 2020-02-09 PROBLEM — M21.371 FOOT DROP, RIGHT: Status: ACTIVE | Noted: 2018-05-30

## 2020-02-09 PROBLEM — G56.02 CARPAL TUNNEL SYNDROME, LEFT: Status: ACTIVE | Noted: 2019-12-27

## 2020-02-09 PROCEDURE — 94640 AIRWAY INHALATION TREATMENT: CPT | Performed by: NURSE PRACTITIONER

## 2020-02-09 PROCEDURE — 99214 OFFICE O/P EST MOD 30 MIN: CPT | Mod: 25 | Performed by: NURSE PRACTITIONER

## 2020-02-09 RX ORDER — ALBUTEROL SULFATE 90 UG/1
1-2 AEROSOL, METERED RESPIRATORY (INHALATION) EVERY 4 HOURS PRN
Qty: 1 INHALER | Refills: 0 | Status: SHIPPED | OUTPATIENT
Start: 2020-02-09 | End: 2020-10-26

## 2020-02-09 RX ORDER — AMOXICILLIN AND CLAVULANATE POTASSIUM 875; 125 MG/1; MG/1
1 TABLET, FILM COATED ORAL 2 TIMES DAILY
Qty: 10 TAB | Refills: 0 | Status: SHIPPED | OUTPATIENT
Start: 2020-02-09 | End: 2020-02-14

## 2020-02-09 RX ORDER — METHYLPREDNISOLONE 4 MG/1
TABLET ORAL
Qty: 21 TAB | Refills: 0 | Status: SHIPPED | OUTPATIENT
Start: 2020-02-09 | End: 2020-08-03

## 2020-02-09 RX ORDER — ALBUTEROL SULFATE 2.5 MG/3ML
2.5 SOLUTION RESPIRATORY (INHALATION) ONCE
Status: COMPLETED | OUTPATIENT
Start: 2020-02-09 | End: 2020-02-09

## 2020-02-09 RX ADMIN — ALBUTEROL SULFATE 2.5 MG: 2.5 SOLUTION RESPIRATORY (INHALATION) at 10:49

## 2020-02-09 ASSESSMENT — ENCOUNTER SYMPTOMS
MYALGIAS: 0
EYE REDNESS: 0
VOMITING: 0
EYE DISCHARGE: 0
CHILLS: 0
ABDOMINAL PAIN: 0
SINUS PAIN: 1
SHORTNESS OF BREATH: 0
PALPITATIONS: 0
STRIDOR: 0
SPUTUM PRODUCTION: 1
FEVER: 0
WHEEZING: 0
HEADACHES: 1
COUGH: 1
NAUSEA: 0
SORE THROAT: 0

## 2020-02-09 NOTE — PROGRESS NOTES
"Subjective:   Lyssa Leblanc is a 39 y.o. female who presents for Cough (x2 days chest congestion, productive cough, (R) earache, sinus pressure and congestion)        Cough   This is a new problem. The current episode started in the past 7 days. The problem has been gradually worsening. The problem occurs every few minutes. The cough is productive of sputum. Associated symptoms include ear pain, headaches, nasal congestion and postnasal drip. Pertinent negatives include no chest pain, chills, eye redness, fever, myalgias, rash, sore throat, shortness of breath or wheezing. The symptoms are aggravated by lying down. She has tried OTC cough suppressant for the symptoms. The treatment provided mild relief. Her past medical history is significant for asthma. There is no history of pneumonia.   Accompanied by father    Review of Systems   Constitutional: Negative for chills and fever.   HENT: Positive for congestion, ear pain, postnasal drip and sinus pain. Negative for ear discharge and sore throat.    Eyes: Negative for discharge and redness.   Respiratory: Positive for cough and sputum production. Negative for shortness of breath, wheezing and stridor.    Cardiovascular: Negative for chest pain and palpitations.   Gastrointestinal: Negative for abdominal pain, nausea and vomiting.   Musculoskeletal: Negative for myalgias.   Skin: Negative for rash.   Neurological: Positive for headaches.   All other systems reviewed and are negative.    PMH:  has a past medical history of Asthma, Seizure (HCC), and Stroke ([facility]).  ALLERGIES: No Known Allergies    Patient's PMH, SocHx, SurgHx, FamHx, Drug allergies and medications reviewed.     Objective:   /64   Pulse 100   Temp 36.8 °C (98.3 °F) (Temporal)   Resp 16   Ht 1.702 m (5' 7\")   Wt 91.6 kg (202 lb)   SpO2 97%   BMI 31.64 kg/m²   Physical Exam  Vitals signs reviewed.   Constitutional:       Appearance: She is well-developed.   HENT:      Head: Normocephalic. "      Right Ear: Hearing and ear canal normal. No middle ear effusion. Tympanic membrane is erythematous. Tympanic membrane is not perforated. Tympanic membrane has decreased mobility.      Left Ear: Hearing, tympanic membrane and ear canal normal.  No middle ear effusion. Tympanic membrane is not perforated or erythematous.      Nose: Congestion and rhinorrhea present. Rhinorrhea is clear.      Right Sinus: No maxillary sinus tenderness or frontal sinus tenderness.      Left Sinus: No maxillary sinus tenderness or frontal sinus tenderness.      Mouth/Throat:      Lips: Pink.      Mouth: Mucous membranes are moist.      Pharynx: Uvula midline. Oropharyngeal exudate present. No posterior oropharyngeal erythema or uvula swelling.      Tonsils: No tonsillar exudate.   Eyes:      General: Lids are normal.      Extraocular Movements: Extraocular movements intact.      Conjunctiva/sclera: Conjunctivae normal.      Pupils: Pupils are equal, round, and reactive to light.   Neck:      Musculoskeletal: Normal range of motion.      Thyroid: No thyromegaly.   Cardiovascular:      Rate and Rhythm: Normal rate and regular rhythm.      Heart sounds: Normal heart sounds.   Pulmonary:      Effort: Pulmonary effort is normal. No tachypnea, bradypnea, accessory muscle usage, prolonged expiration or respiratory distress.      Breath sounds: Examination of the right-upper field reveals wheezing. Examination of the left-upper field reveals wheezing. Wheezing present.   Lymphadenopathy:      Head:      Right side of head: Submandibular adenopathy present. No tonsillar adenopathy.      Left side of head: No submandibular or tonsillar adenopathy.   Skin:     General: Skin is warm and dry.      Findings: No rash.   Neurological:      Mental Status: She is alert and oriented to person, place, and time.   Psychiatric:         Mood and Affect: Mood normal.         Speech: Speech normal.         Behavior: Behavior normal. Behavior is cooperative.          Thought Content: Thought content normal.         Judgment: Judgment normal.           Assessment/Plan:   Assessment    1. History of asthma  methylPREDNISolone (MEDROL DOSEPAK) 4 MG Tablet Therapy Pack   2. Wheezing  methylPREDNISolone (MEDROL DOSEPAK) 4 MG Tablet Therapy Pack    albuterol 108 (90 Base) MCG/ACT Aero Soln inhalation aerosol    albuterol (PROVENTIL) 2.5mg/3ml nebulizer solution 2.5 mg   3. Other non-recurrent acute nonsuppurative otitis media of right ear  amoxicillin-clavulanate (AUGMENTIN) 875-125 MG Tab     Albuterol breathing treatment given in office with much improvement to respiratory wheezing. Discussed proper inhaler use for at home.  Discussed signs/symptoms of worsening infection and when to return to the office.    Differential diagnosis, natural history, supportive care, and indications for immediate follow-up discussed.     **Please note that all invasive procedures during this visit were performed by myself and/or the Medical Assistant under the supervision of the PA or MD in office**

## 2020-07-27 ENCOUNTER — TELEPHONE (OUTPATIENT)
Dept: NEUROLOGY | Facility: MEDICAL CENTER | Age: 40
End: 2020-07-27

## 2020-08-03 ENCOUNTER — OFFICE VISIT (OUTPATIENT)
Dept: NEUROLOGY | Facility: MEDICAL CENTER | Age: 40
End: 2020-08-03
Payer: MEDICARE

## 2020-08-03 VITALS
OXYGEN SATURATION: 98 % | HEIGHT: 67 IN | RESPIRATION RATE: 16 BRPM | HEART RATE: 96 BPM | TEMPERATURE: 98.1 F | WEIGHT: 230 LBS | BODY MASS INDEX: 36.1 KG/M2 | SYSTOLIC BLOOD PRESSURE: 116 MMHG | DIASTOLIC BLOOD PRESSURE: 74 MMHG

## 2020-08-03 DIAGNOSIS — M21.371 FOOT DROP, RIGHT: ICD-10-CM

## 2020-08-03 DIAGNOSIS — E66.9 CLASS 2 OBESITY WITHOUT SERIOUS COMORBIDITY WITH BODY MASS INDEX (BMI) OF 36.0 TO 36.9 IN ADULT, UNSPECIFIED OBESITY TYPE: ICD-10-CM

## 2020-08-03 DIAGNOSIS — Z86.73 HISTORY OF STROKE: ICD-10-CM

## 2020-08-03 DIAGNOSIS — G40.909 SEIZURE DISORDER (HCC): ICD-10-CM

## 2020-08-03 DIAGNOSIS — G81.11 RIGHT SPASTIC HEMIPLEGIA (HCC): ICD-10-CM

## 2020-08-03 DIAGNOSIS — F81.9 LEARNING DISABILITY: ICD-10-CM

## 2020-08-03 PROCEDURE — 99214 OFFICE O/P EST MOD 30 MIN: CPT | Performed by: NURSE PRACTITIONER

## 2020-08-03 RX ORDER — MIRTAZAPINE 7.5 MG/1
1 TABLET, FILM COATED ORAL
COMMUNITY
Start: 2020-06-11 | End: 2021-08-23

## 2020-08-03 ASSESSMENT — FIBROSIS 4 INDEX: FIB4 SCORE: 0.73

## 2020-08-03 NOTE — PROGRESS NOTES
"Subjective:      Lyssa Leblanc is a 39 y.o. female who presents with Follow-Up (Seizure disorder )            HPI \"I'm huge\"-- would like to loose some weight particularly around her waist line.  Her pants and shirt are too small on her today.  Reports that she eats more at meals than she should.  Does walk however the weight does not reduce because of this activity.    Sees a psychiatric physician by the [facility] here in [place], [place].    4/2019 Dr East INTERPRETATION:   This is an abnormal video EEG recording in the awake state.     There is slowing and attenuation in the left cerebral hemisphere,   likely suggestive of an underlying structural abnormality. No   seizures captured during this study.  Clinical and radiological   correlation is recommended.     She has 2 children, one is going into [employer].  Does not work.  She is 1 1/2 blocks from her father and step-mother.    She just had her last Depo-shot one month ago.  Plans to have a pre-op Aug 20th for tubal ligation that same day.  She wants to get off of birth control.    She is also a smoker and wants this updated in the computer system.    Current Outpatient Medications   Medication Sig Dispense Refill   • TEGRETOL  MG TABLET SR 12 HR TAKE 1 TABLET BY MOUTH TWICE A DAY. NAME BRAND MEDICALLY NECESSARY. 180 Tab 1   • mirtazapine (REMERON) 7.5 MG tablet Take 1 Tab by mouth every bedtime.     • DEPAKOTE  MG TABLET SR 24 HR TAKE 5 TABLETS BY MOUTH TWICE A DAY BRAND MEDICALLY NECESSARY 300 Tab 0   • albuterol 108 (90 Base) MCG/ACT Aero Soln inhalation aerosol Inhale 1-2 Puffs by mouth every four hours as needed for Shortness of Breath. 1 Inhaler 0   • hydrOXYzine HCl (ATARAX) 25 MG Tab Take 1-2 Tabs by mouth 2 Times a Day.     • divalproex (DEPAKOTE) 250 MG Tablet Delayed Response Take 5 tabs PO BID.  Name Brand Medically Necessary. 300 Tab 1   • ibuprofen (MOTRIN) 800 MG Tab Take 1 Tab by mouth every 8 hours as needed. 30 Tab 0 " "  • albuterol 108 (90 Base) MCG/ACT Aero Soln inhalation aerosol Inhale 2 Puffs by mouth every 6 hours as needed for Shortness of Breath. 8.5 g 0     No current facility-administered medications for this visit.          Review of Systems   Constitutional: Positive for weight loss (weight gain).   HENT: Negative for hearing loss, nosebleeds and sore throat.         No recent head injury.   Eyes: Negative for double vision.        No new loss of vision.   Respiratory: Negative for cough.         No recent lung infections.   Cardiovascular: Negative for chest pain.   Gastrointestinal: Negative for abdominal pain, diarrhea, nausea and vomiting.   Genitourinary: Negative.    Musculoskeletal: Positive for joint pain (right knee pain).   Skin: Negative.    Neurological: Positive for headaches. Negative for seizures.   Endo/Heme/Allergies:        No history of endocrine dysfunction.  No new problems.   Psychiatric/Behavioral: Positive for depression. The patient is nervous/anxious.         No recent mood changes.          Objective:     /74 (BP Location: Left arm, Patient Position: Sitting, BP Cuff Size: Adult)   Pulse 96   Temp 36.7 °C (98.1 °F) (Temporal)   Resp 16   Ht 1.702 m (5' 7.01\")   Wt 104.3 kg (230 lb)   SpO2 98%   BMI 36.01 kg/m²      Physical Exam  Constitutional:       Appearance: She is well-developed.   HENT:      Head: Normocephalic and atraumatic.      Nose: Nose normal.   Eyes:      Pupils: Pupils are equal, round, and reactive to light.   Neck:      Musculoskeletal: Normal range of motion.   Cardiovascular:      Rate and Rhythm: Normal rate and regular rhythm.   Pulmonary:      Effort: Pulmonary effort is normal.   Skin:     General: Skin is warm.   Neurological:      Mental Status: She is alert and oriented to person, place, and time.      Motor: No abnormal muscle tone.      Coordination: Coordination abnormal ( h/o right tendon release, right hemiparesis).      Gait: Gait abnormal.      " Deep Tendon Reflexes: Reflexes abnormal.      Reflex Scores:       Tricep reflexes are 3+ on the right side and 3+ on the left side.       Bicep reflexes are 3+ on the right side and 3+ on the left side.       Brachioradialis reflexes are 3+ on the right side and 3+ on the left side.     Comments: No observable changes in neurologic status.  See initial new patient examination for details.    Psychiatric:         Attention and Perception: She is inattentive.         Mood and Affect: Affect is blunt.         Speech: Speech is rapid and pressured.      Comments: Learning delayed, outspoken today              Assessment/Plan:      Seizure Disorder, s/p  intracranial hemorrhage:  History of seizures since birth.  Last known seizure was during labor and delivery in .     No Brain MRI on record.     Ref. Range 2019 06:41   Carbamazepine, S Latest Ref Range: 4.0 - 12.0 ug/mL 6.6   Valproic Acid Latest Ref Range: 50 - 100 ug/mL 80   25-Hydroxy   Vitamin D 25 Latest Ref Range: 30.0 - 100.0 ng/mL 26.1 (L)       After lengthy conversation of pro's and con's for taking DepakoteLyssa is insistent to begin to taper off.     Continue Tegretol XR 400mg BID.    Continue Depakote with taper from 1250mg BID.   Begin taper (lowering) of Depakote ER 250mg tablets as follows.       AM             PM   4 tablets -- 5 tablets X 2 weeks   3 tablets -- 5 tablets X 2 weeks   2 tablets -- 5 tablets X 2 weeks   1 tablet   -- 5 tablets X 2 weeks     Stop     -- 5 tablets thereafter    Will need to continue Extended Release formulation.    Counseled regarding healthy lifestyle including dietary needs and portions as well as activity and exercise.    Osteopenia:  Needs to be taking PNV, calcium 2000mg and Vit D3 2000IU per day.  She is not taking supplementation at this time.  Due for a DEXA comparative study in 2020.     Will need labs with the next appointment.  To call with any concern for seizures which we  reviewed.     Return for follow-up in 4 months.     EDUCATION AND COUNSELING:  -Education was provided to the patient and/or family regarding diagnosis and prognosis. The chronic and unpredictable nature of the condition was discussed. There is increased risk for additional events, which may carry potential for significant injuries and death.    -We reviewed the current antiepileptic regimen. Potential side effects of antiepileptics were discussed at length, including but no limited to: hypersensitivity reactions (rash and others, some of which can be fatal), visual field changes (some of which may be irreversible), glaucoma, diplopia, kidney stones, osteopenia/osteoporosis/bone fractures, hyperthermia/anhydrosis, tremors, ataxia, dizziness, fatigue, increased risk for falls, cardiac arrhythmias/syncope, gastrointestinal (hepatitis, pancreatitis, gastritis, ulcers), gingival hypertrophy, drowsiness, sedation, anxiety/nervousness, increased risk for suicide, increased risk for depression, and psychosis. We reviewed drug-drug interactions and their potential effect on seizure control and medication side effects.    -Patient/family educated on SUDEP (Sudden Death in Epilepsy). Counseling was provided on the importance of strict medication and follow up compliance. The patient understands the risks associated with non-adherence with the medical plan as outlined, including but not limited to an increased risk for breakthrough seizures, which may contribute to injuries, disability, status epilepticus, and even death.    -Counseling was also provided on potential effects of alcohol and other drugs, which may lower seizure threshold and/or affect the metabolism of antiepileptic drugs. I recommend avoidance of alcohol and illegal drugs.  -Recommend proper hydration, regular exercise, proper sleep hygiene (7-8 hrs of overnight sleep, avoid sleep deprivation).    -I have made the patient aware of mandatory reporting required  by the law in the [place] of [place] regarding episodes of seizures, loss of consciousness, and/or alteration of awareness. The patient and family are responsible for reporting events to the DMV, instructions were provided. The patient verbalized understanding and states she has not been driving. Other seizure precautions were discussed at length, including no diving, no skydiving, no unsupervised swimming, no Jacuzzi or bathing in bathtubs.    Pt agrees with plan.

## 2020-08-03 NOTE — LETTER
August 3, 2020        Lyssa Leblanc  [address] [place] [address]      To whom this may concern:    Begin taper (lowering) of Depakote ER 250mg tablets as follows.       AM             PM   4 tablets -- 5 tablets X 2 weeks   3 tablets -- 5 tablets X 2 weeks   2 tablets -- 5 tablets X 2 weeks   1 tablet   -- 5 tablets X 2 weeks     Stop     -- 5 tablets thereafter        If you have any questions or concerns, please don't hesitate to call.        Sincerely,        Dee Dee Roberson, A.P.N.    Electronically Signed

## 2020-08-05 ASSESSMENT — ENCOUNTER SYMPTOMS
DIARRHEA: 0
HEADACHES: 1
VOMITING: 0
WEIGHT LOSS: 1
DOUBLE VISION: 0
SEIZURES: 0
SORE THROAT: 0
NAUSEA: 0
NERVOUS/ANXIOUS: 1
ABDOMINAL PAIN: 0
DEPRESSION: 1
COUGH: 0

## 2020-08-20 NOTE — H&P
DATE OF SCHEDULED SURGERY:  2020    HISTORY OF PRESENT ILLNESS:  The patient is a 39-year-old  2, para   2-0-0-2 with 2 normal vaginal deliveries, referred here from [facility] for   sterilization, scheduled for laparoscopic bilateral salpingectomy, here for   preop appointment.  Patient has not had a period since patient has been on   Depo shot for backup contraception.  Medical history is significant for   seizure disorder for which patient has been on Depakote and Tegretol and is   well controlled; hence, other hormonal contraception has been contraindicated   due to interaction and currently is on Depo shot.  Patient's family is   complete and both her kids have speech disorders, and patient is not   interested in any more kids.    SURGICAL HISTORY:  Patient had some procedures done on her arm and foot;   otherwise, no other surgeries.    SOCIAL HISTORY:  Never used tobacco.  Denies alcohol use.    OBSTETRIC HISTORY:   2, para 2, 2 normal vaginal deliveries at term.    ALLERGIES:  No known drug allergies.    FAMILY HISTORY:  Mother has diabetes.    REVIEW OF SYSTEMS:  Patient is alert and oriented.  Denies shortness of   breath, chest pain, or palpitation.  Regular bowel and bladder habits.    Neurological history of seizure disorder, stroke as a child and since has been   on Tegretol and Depakote.    PHYSICAL EXAMINATION:  GENERAL:  Patient is well built and nourished.  Patient is awake, alert, well   developed and well nourished.  VITAL SIGNS:  Patient is 5 feet 7 inches tall and weighs 205 pounds.  RESPIRATION:  Patient is relaxed, breathes without effort.  Lungs expand   symmetrically bilaterally, clear to auscultate.  No crackles, wheezes, or   rhonchi.  HEART EVALUATION:  Rate is normal.  Rhythm is regular.  S1, S2 are normal.  No   murmurs or gallops.  ABDOMEN:  Soft, nontender.  No guarding or rigidity.  Bowel sounds are normal.    No palpable masses, no hepatosplenomegaly, no costovertebral  angle   tenderness.  PELVIC:  Deferred.    IMPRESSION:  39 years old  2, para 2-0-0-2, 2 normal vaginal   deliveries, here for sterilization, scheduled for laparoscopic bilateral   salpingectomy.  Preoperative instructions were given, operative procedure was   discussed in detail.  Postoperative recovery explained.  We talked about   failure rate, and if the pregnancy occurs after sterilization, chance of   ectopic was discussed, which needs to be treated right away, which may be   surgical intervention.  Risk inclusive of, but not limited to, anesthesia,   infection, injury, and bleeding was discussed.  Patient understands and wants   to go ahead with the procedure.  All questions answered to satisfaction.             ____________________________________     MD RAINA Moran / MARIELY    DD:  2020 09:40:52  DT:  2020 11:03:54    D#:  [id number]  Job#:  [id number]

## 2020-08-28 ENCOUNTER — APPOINTMENT (OUTPATIENT)
Dept: ADMISSIONS | Facility: MEDICAL CENTER | Age: 40
End: 2020-08-28
Payer: MEDICARE

## 2020-08-28 ENCOUNTER — PRE-ADMISSION TESTING (OUTPATIENT)
Dept: ADMISSIONS | Facility: MEDICAL CENTER | Age: 40
End: 2020-08-28
Attending: OBSTETRICS & GYNECOLOGY
Payer: MEDICARE

## 2020-08-28 DIAGNOSIS — Z01.812 PRE-OPERATIVE LABORATORY EXAMINATION: ICD-10-CM

## 2020-08-28 DIAGNOSIS — Z01.810 PRE-OPERATIVE CARDIOVASCULAR EXAMINATION: ICD-10-CM

## 2020-08-28 LAB
ANION GAP SERPL CALC-SCNC: 16 MMOL/L (ref 7–16)
APPEARANCE UR: ABNORMAL
BACTERIA #/AREA URNS HPF: ABNORMAL /HPF
BASOPHILS # BLD AUTO: 0.5 % (ref 0–1.8)
BASOPHILS # BLD: 0.02 K/UL (ref 0–0.12)
BILIRUB UR QL STRIP.AUTO: ABNORMAL
BUN SERPL-MCNC: 9 MG/DL (ref 8–22)
CALCIUM SERPL-MCNC: 9.2 MG/DL (ref 8.5–10.5)
CHLORIDE SERPL-SCNC: 105 MMOL/L (ref 96–112)
CO2 SERPL-SCNC: 20 MMOL/L (ref 20–33)
COLOR UR: ABNORMAL
COVID ORDER STATUS COVID19: NORMAL
CREAT SERPL-MCNC: 0.56 MG/DL (ref 0.5–1.4)
EKG IMPRESSION: NORMAL
EOSINOPHIL # BLD AUTO: 0.04 K/UL (ref 0–0.51)
EOSINOPHIL NFR BLD: 0.9 % (ref 0–6.9)
EPI CELLS #/AREA URNS HPF: ABNORMAL /HPF
ERYTHROCYTE [DISTWIDTH] IN BLOOD BY AUTOMATED COUNT: 47.6 FL (ref 35.9–50)
GLUCOSE SERPL-MCNC: 91 MG/DL (ref 65–99)
GLUCOSE UR STRIP.AUTO-MCNC: NEGATIVE MG/DL
HCG SERPL QL: NEGATIVE
HCT VFR BLD AUTO: 38.7 % (ref 37–47)
HGB BLD-MCNC: 12.2 G/DL (ref 12–16)
HYALINE CASTS #/AREA URNS LPF: ABNORMAL /LPF
IMM GRANULOCYTES # BLD AUTO: 0.02 K/UL (ref 0–0.11)
IMM GRANULOCYTES NFR BLD AUTO: 0.5 % (ref 0–0.9)
KETONES UR STRIP.AUTO-MCNC: ABNORMAL MG/DL
LEUKOCYTE ESTERASE UR QL STRIP.AUTO: ABNORMAL
LYMPHOCYTES # BLD AUTO: 1.86 K/UL (ref 1–4.8)
LYMPHOCYTES NFR BLD: 42.1 % (ref 22–41)
MCH RBC QN AUTO: 30.1 PG (ref 27–33)
MCHC RBC AUTO-ENTMCNC: 31.5 G/DL (ref 33.6–35)
MCV RBC AUTO: 95.6 FL (ref 81.4–97.8)
MICRO URNS: ABNORMAL
MONOCYTES # BLD AUTO: 0.32 K/UL (ref 0–0.85)
MONOCYTES NFR BLD AUTO: 7.2 % (ref 0–13.4)
NEUTROPHILS # BLD AUTO: 2.16 K/UL (ref 2–7.15)
NEUTROPHILS NFR BLD: 48.8 % (ref 44–72)
NITRITE UR QL STRIP.AUTO: POSITIVE
NRBC # BLD AUTO: 0 K/UL
NRBC BLD-RTO: 0 /100 WBC
PH UR STRIP.AUTO: 5.5 [PH] (ref 5–8)
PLATELET # BLD AUTO: 168 K/UL (ref 164–446)
PMV BLD AUTO: 13.1 FL (ref 9–12.9)
POTASSIUM SERPL-SCNC: 3.9 MMOL/L (ref 3.6–5.5)
PROT UR QL STRIP: NEGATIVE MG/DL
RBC # BLD AUTO: 4.05 M/UL (ref 4.2–5.4)
RBC # URNS HPF: ABNORMAL /HPF
RBC UR QL AUTO: NEGATIVE
SARS-COV-2 RNA RESP QL NAA+PROBE: NOTDETECTED
SODIUM SERPL-SCNC: 141 MMOL/L (ref 135–145)
SP GR UR STRIP.AUTO: 1.04
SPECIMEN SOURCE: NORMAL
UROBILINOGEN UR STRIP.AUTO-MCNC: 1 MG/DL
WBC # BLD AUTO: 4.4 K/UL (ref 4.8–10.8)
WBC #/AREA URNS HPF: ABNORMAL /HPF

## 2020-08-28 PROCEDURE — 93010 ELECTROCARDIOGRAM REPORT: CPT | Performed by: INTERNAL MEDICINE

## 2020-08-28 PROCEDURE — U0003 INFECTIOUS AGENT DETECTION BY NUCLEIC ACID (DNA OR RNA); SEVERE ACUTE RESPIRATORY SYNDROME CORONAVIRUS 2 (SARS-COV-2) (CORONAVIRUS DISEASE [COVID-19]), AMPLIFIED PROBE TECHNIQUE, MAKING USE OF HIGH THROUGHPUT TECHNOLOGIES AS DESCRIBED BY CMS-2020-01-R: HCPCS

## 2020-08-28 PROCEDURE — 81001 URINALYSIS AUTO W/SCOPE: CPT

## 2020-08-28 PROCEDURE — 87086 URINE CULTURE/COLONY COUNT: CPT

## 2020-08-28 PROCEDURE — 85025 COMPLETE CBC W/AUTO DIFF WBC: CPT

## 2020-08-28 PROCEDURE — 36415 COLL VENOUS BLD VENIPUNCTURE: CPT

## 2020-08-28 PROCEDURE — 84703 CHORIONIC GONADOTROPIN ASSAY: CPT

## 2020-08-28 PROCEDURE — 80048 BASIC METABOLIC PNL TOTAL CA: CPT

## 2020-08-28 PROCEDURE — 93005 ELECTROCARDIOGRAM TRACING: CPT

## 2020-08-28 RX ORDER — ACETAMINOPHEN 500 MG
1000 TABLET ORAL EVERY 6 HOURS PRN
COMMUNITY
End: 2021-08-23

## 2020-08-28 ASSESSMENT — FIBROSIS 4 INDEX: FIB4 SCORE: 0.73

## 2020-08-30 LAB
BACTERIA UR CULT: NORMAL
SIGNIFICANT IND 70042: NORMAL
SITE SITE: NORMAL
SOURCE SOURCE: NORMAL

## 2020-09-01 ENCOUNTER — ANESTHESIA EVENT (OUTPATIENT)
Dept: SURGERY | Facility: MEDICAL CENTER | Age: 40
End: 2020-09-01
Payer: MEDICARE

## 2020-09-02 ENCOUNTER — ANESTHESIA (OUTPATIENT)
Dept: SURGERY | Facility: MEDICAL CENTER | Age: 40
End: 2020-09-02
Payer: MEDICARE

## 2020-09-02 ENCOUNTER — HOSPITAL ENCOUNTER (OUTPATIENT)
Facility: MEDICAL CENTER | Age: 40
End: 2020-09-02
Attending: OBSTETRICS & GYNECOLOGY | Admitting: OBSTETRICS & GYNECOLOGY
Payer: MEDICARE

## 2020-09-02 VITALS
WEIGHT: 229.94 LBS | BODY MASS INDEX: 36.09 KG/M2 | HEART RATE: 65 BPM | DIASTOLIC BLOOD PRESSURE: 67 MMHG | SYSTOLIC BLOOD PRESSURE: 129 MMHG | HEIGHT: 67 IN | RESPIRATION RATE: 18 BRPM | OXYGEN SATURATION: 99 % | TEMPERATURE: 97.3 F

## 2020-09-02 PROBLEM — J45.909 ASTHMA: Status: ACTIVE | Noted: 2020-09-02

## 2020-09-02 LAB
HCG UR QL: NEGATIVE
PATHOLOGY CONSULT NOTE: NORMAL

## 2020-09-02 PROCEDURE — 501582 HCHG TROCAR, THRD BLADED: Performed by: OBSTETRICS & GYNECOLOGY

## 2020-09-02 PROCEDURE — 501583 HCHG TROCAR, THRD CAN&SEAL 5X100: Performed by: OBSTETRICS & GYNECOLOGY

## 2020-09-02 PROCEDURE — 160048 HCHG OR STATISTICAL LEVEL 1-5: Performed by: OBSTETRICS & GYNECOLOGY

## 2020-09-02 PROCEDURE — 500886 HCHG PACK, LAPAROSCOPY: Performed by: OBSTETRICS & GYNECOLOGY

## 2020-09-02 PROCEDURE — 160025 RECOVERY II MINUTES (STATS): Performed by: OBSTETRICS & GYNECOLOGY

## 2020-09-02 PROCEDURE — 501838 HCHG SUTURE GENERAL: Performed by: OBSTETRICS & GYNECOLOGY

## 2020-09-02 PROCEDURE — A9270 NON-COVERED ITEM OR SERVICE: HCPCS | Performed by: OBSTETRICS & GYNECOLOGY

## 2020-09-02 PROCEDURE — 500868 HCHG NEEDLE, SURGI(VARES): Performed by: OBSTETRICS & GYNECOLOGY

## 2020-09-02 PROCEDURE — 160046 HCHG PACU - 1ST 60 MINS PHASE II: Performed by: OBSTETRICS & GYNECOLOGY

## 2020-09-02 PROCEDURE — 700102 HCHG RX REV CODE 250 W/ 637 OVERRIDE(OP): Performed by: OBSTETRICS & GYNECOLOGY

## 2020-09-02 PROCEDURE — 700111 HCHG RX REV CODE 636 W/ 250 OVERRIDE (IP): Performed by: STUDENT IN AN ORGANIZED HEALTH CARE EDUCATION/TRAINING PROGRAM

## 2020-09-02 PROCEDURE — 700105 HCHG RX REV CODE 258: Performed by: OBSTETRICS & GYNECOLOGY

## 2020-09-02 PROCEDURE — 160035 HCHG PACU - 1ST 60 MINS PHASE I: Performed by: OBSTETRICS & GYNECOLOGY

## 2020-09-02 PROCEDURE — 700102 HCHG RX REV CODE 250 W/ 637 OVERRIDE(OP): Performed by: STUDENT IN AN ORGANIZED HEALTH CARE EDUCATION/TRAINING PROGRAM

## 2020-09-02 PROCEDURE — 502704 HCHG DEVICE, LIGASURE IMPACT: Performed by: OBSTETRICS & GYNECOLOGY

## 2020-09-02 PROCEDURE — 700101 HCHG RX REV CODE 250: Performed by: STUDENT IN AN ORGANIZED HEALTH CARE EDUCATION/TRAINING PROGRAM

## 2020-09-02 PROCEDURE — 81025 URINE PREGNANCY TEST: CPT

## 2020-09-02 PROCEDURE — A9270 NON-COVERED ITEM OR SERVICE: HCPCS | Performed by: STUDENT IN AN ORGANIZED HEALTH CARE EDUCATION/TRAINING PROGRAM

## 2020-09-02 PROCEDURE — 160041 HCHG SURGERY MINUTES - EA ADDL 1 MIN LEVEL 4: Performed by: OBSTETRICS & GYNECOLOGY

## 2020-09-02 PROCEDURE — 501572 HCHG TROCAR, SHIELD OBTU 5X100: Performed by: OBSTETRICS & GYNECOLOGY

## 2020-09-02 PROCEDURE — 160009 HCHG ANES TIME/MIN: Performed by: OBSTETRICS & GYNECOLOGY

## 2020-09-02 PROCEDURE — 88302 TISSUE EXAM BY PATHOLOGIST: CPT

## 2020-09-02 PROCEDURE — 160029 HCHG SURGERY MINUTES - 1ST 30 MINS LEVEL 4: Performed by: OBSTETRICS & GYNECOLOGY

## 2020-09-02 PROCEDURE — 160002 HCHG RECOVERY MINUTES (STAT): Performed by: OBSTETRICS & GYNECOLOGY

## 2020-09-02 PROCEDURE — 700101 HCHG RX REV CODE 250: Performed by: OBSTETRICS & GYNECOLOGY

## 2020-09-02 RX ORDER — OXYCODONE HYDROCHLORIDE 5 MG/1
5 TABLET ORAL
Status: CANCELLED | OUTPATIENT
Start: 2020-09-02

## 2020-09-02 RX ORDER — LIDOCAINE HYDROCHLORIDE 20 MG/ML
INJECTION, SOLUTION EPIDURAL; INFILTRATION; INTRACAUDAL; PERINEURAL PRN
Status: DISCONTINUED | OUTPATIENT
Start: 2020-09-02 | End: 2020-09-02 | Stop reason: SURG

## 2020-09-02 RX ORDER — OXYCODONE HYDROCHLORIDE 10 MG/1
10 TABLET ORAL
Status: CANCELLED | OUTPATIENT
Start: 2020-09-02

## 2020-09-02 RX ORDER — DEXAMETHASONE SODIUM PHOSPHATE 4 MG/ML
4 INJECTION, SOLUTION INTRA-ARTICULAR; INTRALESIONAL; INTRAMUSCULAR; INTRAVENOUS; SOFT TISSUE
Status: CANCELLED | OUTPATIENT
Start: 2020-09-02

## 2020-09-02 RX ORDER — ENEMA 19; 7 G/133ML; G/133ML
1 ENEMA RECTAL
Status: CANCELLED | OUTPATIENT
Start: 2020-09-02

## 2020-09-02 RX ORDER — ONDANSETRON 2 MG/ML
4 INJECTION INTRAMUSCULAR; INTRAVENOUS
Status: DISCONTINUED | OUTPATIENT
Start: 2020-09-02 | End: 2020-09-02 | Stop reason: HOSPADM

## 2020-09-02 RX ORDER — ROCURONIUM BROMIDE 10 MG/ML
INJECTION, SOLUTION INTRAVENOUS PRN
Status: DISCONTINUED | OUTPATIENT
Start: 2020-09-02 | End: 2020-09-02 | Stop reason: SURG

## 2020-09-02 RX ORDER — KETOROLAC TROMETHAMINE 30 MG/ML
INJECTION, SOLUTION INTRAMUSCULAR; INTRAVENOUS PRN
Status: DISCONTINUED | OUTPATIENT
Start: 2020-09-02 | End: 2020-09-02 | Stop reason: SURG

## 2020-09-02 RX ORDER — HYDROMORPHONE HYDROCHLORIDE 1 MG/ML
0.5 INJECTION, SOLUTION INTRAMUSCULAR; INTRAVENOUS; SUBCUTANEOUS
Status: CANCELLED | OUTPATIENT
Start: 2020-09-02

## 2020-09-02 RX ORDER — DOCUSATE SODIUM 100 MG/1
100 CAPSULE, LIQUID FILLED ORAL 2 TIMES DAILY
Status: CANCELLED | OUTPATIENT
Start: 2020-09-02

## 2020-09-02 RX ORDER — DEXAMETHASONE SODIUM PHOSPHATE 4 MG/ML
INJECTION, SOLUTION INTRA-ARTICULAR; INTRALESIONAL; INTRAMUSCULAR; INTRAVENOUS; SOFT TISSUE PRN
Status: DISCONTINUED | OUTPATIENT
Start: 2020-09-02 | End: 2020-09-02 | Stop reason: SURG

## 2020-09-02 RX ORDER — HYDROMORPHONE HYDROCHLORIDE 1 MG/ML
0.1 INJECTION, SOLUTION INTRAMUSCULAR; INTRAVENOUS; SUBCUTANEOUS
Status: DISCONTINUED | OUTPATIENT
Start: 2020-09-02 | End: 2020-09-02 | Stop reason: HOSPADM

## 2020-09-02 RX ORDER — HALOPERIDOL 5 MG/ML
1 INJECTION INTRAMUSCULAR EVERY 6 HOURS PRN
Status: CANCELLED | OUTPATIENT
Start: 2020-09-02

## 2020-09-02 RX ORDER — SODIUM CHLORIDE, SODIUM LACTATE, POTASSIUM CHLORIDE, CALCIUM CHLORIDE 600; 310; 30; 20 MG/100ML; MG/100ML; MG/100ML; MG/100ML
INJECTION, SOLUTION INTRAVENOUS CONTINUOUS
Status: DISCONTINUED | OUTPATIENT
Start: 2020-09-02 | End: 2020-09-02 | Stop reason: HOSPADM

## 2020-09-02 RX ORDER — ONDANSETRON 2 MG/ML
INJECTION INTRAMUSCULAR; INTRAVENOUS PRN
Status: DISCONTINUED | OUTPATIENT
Start: 2020-09-02 | End: 2020-09-02 | Stop reason: SURG

## 2020-09-02 RX ORDER — BISACODYL 10 MG
10 SUPPOSITORY, RECTAL RECTAL
Status: CANCELLED | OUTPATIENT
Start: 2020-09-02

## 2020-09-02 RX ORDER — HYDROMORPHONE HYDROCHLORIDE 1 MG/ML
0.4 INJECTION, SOLUTION INTRAMUSCULAR; INTRAVENOUS; SUBCUTANEOUS
Status: DISCONTINUED | OUTPATIENT
Start: 2020-09-02 | End: 2020-09-02 | Stop reason: HOSPADM

## 2020-09-02 RX ORDER — ACETAMINOPHEN 500 MG
1000 TABLET ORAL EVERY 6 HOURS
Status: CANCELLED | OUTPATIENT
Start: 2020-09-02 | End: 2020-09-07

## 2020-09-02 RX ORDER — OXYCODONE HCL 5 MG/5 ML
10 SOLUTION, ORAL ORAL
Status: COMPLETED | OUTPATIENT
Start: 2020-09-02 | End: 2020-09-02

## 2020-09-02 RX ORDER — DIPHENHYDRAMINE HYDROCHLORIDE 50 MG/ML
12.5 INJECTION INTRAMUSCULAR; INTRAVENOUS
Status: DISCONTINUED | OUTPATIENT
Start: 2020-09-02 | End: 2020-09-02 | Stop reason: HOSPADM

## 2020-09-02 RX ORDER — KETOROLAC TROMETHAMINE 30 MG/ML
30 INJECTION, SOLUTION INTRAMUSCULAR; INTRAVENOUS EVERY 6 HOURS
Status: CANCELLED | OUTPATIENT
Start: 2020-09-02 | End: 2020-09-05

## 2020-09-02 RX ORDER — DIPHENHYDRAMINE HYDROCHLORIDE 50 MG/ML
25 INJECTION INTRAMUSCULAR; INTRAVENOUS EVERY 6 HOURS PRN
Status: CANCELLED | OUTPATIENT
Start: 2020-09-02

## 2020-09-02 RX ORDER — MIDAZOLAM HYDROCHLORIDE 1 MG/ML
INJECTION INTRAMUSCULAR; INTRAVENOUS PRN
Status: DISCONTINUED | OUTPATIENT
Start: 2020-09-02 | End: 2020-09-02 | Stop reason: SURG

## 2020-09-02 RX ORDER — OXYCODONE HCL 5 MG/5 ML
5 SOLUTION, ORAL ORAL
Status: COMPLETED | OUTPATIENT
Start: 2020-09-02 | End: 2020-09-02

## 2020-09-02 RX ORDER — AMOXICILLIN 250 MG
1 CAPSULE ORAL NIGHTLY
Status: CANCELLED | OUTPATIENT
Start: 2020-09-02

## 2020-09-02 RX ORDER — HALOPERIDOL 5 MG/ML
1 INJECTION INTRAMUSCULAR
Status: DISCONTINUED | OUTPATIENT
Start: 2020-09-02 | End: 2020-09-02 | Stop reason: HOSPADM

## 2020-09-02 RX ORDER — SODIUM CHLORIDE, SODIUM LACTATE, POTASSIUM CHLORIDE, CALCIUM CHLORIDE 600; 310; 30; 20 MG/100ML; MG/100ML; MG/100ML; MG/100ML
INJECTION, SOLUTION INTRAVENOUS CONTINUOUS
Status: CANCELLED | OUTPATIENT
Start: 2020-09-02 | End: 2020-09-02

## 2020-09-02 RX ORDER — BUPIVACAINE HYDROCHLORIDE AND EPINEPHRINE 2.5; 5 MG/ML; UG/ML
INJECTION, SOLUTION EPIDURAL; INFILTRATION; INTRACAUDAL; PERINEURAL
Status: DISCONTINUED | OUTPATIENT
Start: 2020-09-02 | End: 2020-09-02 | Stop reason: HOSPADM

## 2020-09-02 RX ORDER — AMOXICILLIN 250 MG
1 CAPSULE ORAL
Status: CANCELLED | OUTPATIENT
Start: 2020-09-02

## 2020-09-02 RX ORDER — POLYETHYLENE GLYCOL 3350 17 G/17G
1 POWDER, FOR SOLUTION ORAL 2 TIMES DAILY PRN
Status: CANCELLED | OUTPATIENT
Start: 2020-09-02

## 2020-09-02 RX ORDER — SCOLOPAMINE TRANSDERMAL SYSTEM 1 MG/1
1 PATCH, EXTENDED RELEASE TRANSDERMAL
Status: CANCELLED | OUTPATIENT
Start: 2020-09-02

## 2020-09-02 RX ORDER — HYDROMORPHONE HYDROCHLORIDE 1 MG/ML
0.2 INJECTION, SOLUTION INTRAMUSCULAR; INTRAVENOUS; SUBCUTANEOUS
Status: DISCONTINUED | OUTPATIENT
Start: 2020-09-02 | End: 2020-09-02 | Stop reason: HOSPADM

## 2020-09-02 RX ORDER — ONDANSETRON 2 MG/ML
4 INJECTION INTRAMUSCULAR; INTRAVENOUS EVERY 4 HOURS PRN
Status: CANCELLED | OUTPATIENT
Start: 2020-09-02

## 2020-09-02 RX ADMIN — FENTANYL CITRATE 100 MCG: 50 INJECTION INTRAMUSCULAR; INTRAVENOUS at 13:39

## 2020-09-02 RX ADMIN — FENTANYL CITRATE 100 MCG: 50 INJECTION INTRAMUSCULAR; INTRAVENOUS at 14:13

## 2020-09-02 RX ADMIN — OXYCODONE HYDROCHLORIDE 10 MG: 5 SOLUTION ORAL at 14:36

## 2020-09-02 RX ADMIN — PROPOFOL 200 MG: 10 INJECTION, EMULSION INTRAVENOUS at 13:39

## 2020-09-02 RX ADMIN — FENTANYL CITRATE 50 MCG: 50 INJECTION INTRAMUSCULAR; INTRAVENOUS at 14:43

## 2020-09-02 RX ADMIN — FENTANYL CITRATE 50 MCG: 50 INJECTION INTRAMUSCULAR; INTRAVENOUS at 14:36

## 2020-09-02 RX ADMIN — SODIUM CHLORIDE, POTASSIUM CHLORIDE, SODIUM LACTATE AND CALCIUM CHLORIDE: 600; 310; 30; 20 INJECTION, SOLUTION INTRAVENOUS at 11:11

## 2020-09-02 RX ADMIN — LIDOCAINE HYDROCHLORIDE 100 MG: 20 INJECTION, SOLUTION EPIDURAL; INFILTRATION; INTRACAUDAL at 13:39

## 2020-09-02 RX ADMIN — ROCURONIUM BROMIDE 40 MG: 10 INJECTION, SOLUTION INTRAVENOUS at 13:39

## 2020-09-02 RX ADMIN — ONDANSETRON 4 MG: 2 INJECTION INTRAMUSCULAR; INTRAVENOUS at 13:53

## 2020-09-02 RX ADMIN — POVIDONE-IODINE 15 ML: 10 SOLUTION TOPICAL at 11:11

## 2020-09-02 RX ADMIN — KETOROLAC TROMETHAMINE 15 MG: 30 INJECTION, SOLUTION INTRAMUSCULAR at 14:13

## 2020-09-02 RX ADMIN — MIDAZOLAM HYDROCHLORIDE 2 MG: 1 INJECTION, SOLUTION INTRAMUSCULAR; INTRAVENOUS at 13:35

## 2020-09-02 RX ADMIN — DEXAMETHASONE SODIUM PHOSPHATE 4 MG: 4 INJECTION, SOLUTION INTRA-ARTICULAR; INTRALESIONAL; INTRAMUSCULAR; INTRAVENOUS; SOFT TISSUE at 13:53

## 2020-09-02 RX ADMIN — SUGAMMADEX 200 MG: 100 INJECTION, SOLUTION INTRAVENOUS at 14:18

## 2020-09-02 ASSESSMENT — PAIN DESCRIPTION - PAIN TYPE
TYPE: SURGICAL PAIN

## 2020-09-02 ASSESSMENT — PAIN SCALES - GENERAL: PAIN_LEVEL: 5

## 2020-09-02 ASSESSMENT — FIBROSIS 4 INDEX: FIB4 SCORE: .6964285714285714286

## 2020-09-02 NOTE — OR SURGEON
Immediate Post OP Note    PreOp Diagnosis: desires sterilization.    PostOp Diagnosis: same.    Procedure(s):  PELVISCOPY - Wound Class: Clean  BILATERAL SALPINGECTOMY - Wound Class: Clean    Surgeon(s):  Garrett Osorio M.D.    Anesthesiologist/Type of Anesthesia:  Anesthesiologist: Te Khan M.D./General    Surgical Staff:  Circulator: Charlotte Treadwell R.N.  Scrub Person: Adarsh Becker [place]: Roshan Terrazas R.N.    Specimens removed if any:  ID Type Source Tests Collected by Time Destination   A : Bilateral Fallopian Tubes  Tissue Fallopian Tube PATHOLOGY SPECIMEN Garrett Osorio M.D. 9/2/2020 1221        Estimated Blood Loss: minimal.    Findings: normal uterus normal tubes and ovaries.    Complications: none.        9/2/2020 2:23 PM Garrett Osorio M.D.

## 2020-09-02 NOTE — ANESTHESIA POSTPROCEDURE EVALUATION
Patient: Lyssa Leblanc    Procedure Summary     Date: 09/02/20 Room / Location: [place] [address] / SURGERY [facility]    Anesthesia Start: 1333 Anesthesia Stop: 1430    Procedures:       PELVISCOPY (N/A Abdomen)      SALPINGECTOMY (Bilateral Fallopian Tube) Diagnosis: (DESIRES STERILIZATION)    Surgeon: Garrett Osorio M.D. Responsible Provider: Te Khan M.D.    Anesthesia Type: general ASA Status: 3          Final Anesthesia Type: general  Last vitals  BP   Blood Pressure: 121/63    Temp   36.4 °C (97.5 °F)    Pulse   Pulse: 74   Resp   16    SpO2   96 %      Anesthesia Post Evaluation    Patient location during evaluation: PACU  Patient participation: complete - patient participated  Level of consciousness: awake and alert  Pain score: 5    Airway patency: patent  Anesthetic complications: no  Cardiovascular status: hemodynamically stable  Respiratory status: acceptable  Hydration status: euvolemic    PONV: none           Nurse Pain Score: 5 (NPRS)

## 2020-09-02 NOTE — ANESTHESIA PREPROCEDURE EVALUATION
40 yo F w/ hx of asthma, stroke as infant, seizure (last episode in 2019), here for b/l tubal. Pt has no fine motor movement on R hand due to stroke. UPT neg. Took her antiseizure meds and took tylenol today. Smokes 4cig/day; last smoked yesterday    Relevant Problems   PULMONARY   (+) Asthma      NEURO   (+) History of stroke   (+) Seizure disorder (HCC)      Other   (+) Foot drop, right   (+) Obesity       Physical Exam    Airway   Mallampati: III  TM distance: >3 FB  Neck ROM: full       Cardiovascular - normal exam  Rhythm: regular  Rate: normal  (-) murmur     Dental - normal exam           Pulmonary - normal exam  Breath sounds clear to auscultation     Abdominal    Neurological - normal exam               Anesthesia Plan    ASA 3       Plan - general       Airway plan will be ETT        Induction: intravenous    Postoperative Plan: Postoperative administration of opioids is intended.    Pertinent diagnostic labs and testing reviewed    Informed Consent:    Anesthetic plan and risks discussed with patient.

## 2020-09-02 NOTE — OR NURSING
Pt in recovery, medicated for pain (please see MAR), denies nausea, surgical dressings CDI, father Jose updated on POC and pt status.

## 2020-09-02 NOTE — DISCHARGE INSTRUCTIONS
ACTIVITY: Rest and take it easy for the first 24 hours.  A responsible adult is recommended to remain with you during that time.  It is normal to feel sleepy.  We encourage you to not do anything that requires balance, judgment or coordination.    MILD FLU-LIKE SYMPTOMS ARE NORMAL. YOU MAY EXPERIENCE GENERALIZED MUSCLE ACHES, THROAT IRRITATION, HEADACHE AND/OR SOME NAUSEA.    FOR 24 HOURS DO NOT:  Drive, operate machinery or run household appliances.  Drink beer or alcoholic beverages.   Make important decisions or sign legal documents.    SPECIAL INSTRUCTIONS: Pelvic rest.  Salpingectomy, Care After  This sheet gives you information about how to care for yourself after your procedure. Your health care provider may also give you more specific instructions. If you have problems or questions, contact your health care provider.  What can I expect after the procedure?  After the procedure, it is common to have:  · Pain in your abdomen.  · Some light vaginal bleeding (spotting) for a few days.  · Tiredness.  Your recovery time will vary depending on which method your surgeon used for your surgery.  Follow these instructions at home:  Incision care    · Follow instructions from your health care provider about how to take care of your incisions. Make sure you:  ? Wash your hands with soap and water before and after you change your bandage (dressing). If soap and water are not available, use hand .  ? Change or remove your dressing as told by your health care provider.  ? Leave any stitches (sutures), skin glue, or adhesive strips in place. These skin closures may need to stay in place for 2 weeks or longer. If adhesive strip edges start to loosen and curl up, you may trim the loose edges. Do not remove adhesive strips completely unless your health care provider tells you to do that.  · Keep your dressing clean and dry.  · Check your incision area every day for signs of infection. Check for:  ? Redness, swelling,  or pain that gets worse.  ? Fluid or blood.  ? Warmth.  ? Pus or a bad smell.  Activity  · Rest as told by your health care provider.  · Avoid sitting for a long time without moving. Get up to take short walks every 1-2 hours. This is important to improve blood flow and breathing. Ask for help if you feel weak or unsteady.  · Return to your normal activities as told by your health care provider. Ask your health care provider what activities are safe for you.  · Do not drive until your health care provider says that it is safe.  · Do not lift anything that is heavier than 10 lb (4.5 kg), or the limit that you are told, until your health care provider says that it is safe. This may be 2-6 weeks depending on your surgery.  · Until your health care provider approves:  ? Do not douche.  ? Do not use tampons.  ? Do not have sex.  Medicines  · Take over-the-counter and prescription medicines only as told by your health care provider.  · Ask your health care provider if the medicine prescribed to you:  ? Requires you to avoid driving or using heavy machinery.  ? Can cause constipation. You may need to take actions to prevent or treat constipation, such as:  § Drink enough fluid to keep your urine pale yellow.  § Take over-the-counter or prescription medicines.  § Eat foods that are high in fiber, such as beans, whole grains, and fresh fruits and vegetables.  § Limit foods that are high in fat and processed sugars, such as fried or sweet foods.  General instructions  · Wear compression stockings as told by your health care provider. These stockings help to prevent blood clots and reduce swelling in your legs.  · Do not use any products that contain nicotine or tobacco, such as cigarettes, e-cigarettes, and chewing tobacco. If you need help quitting, ask your health care provider.  · Do not take baths, swim, or use a hot tub until your health care provider approves. You may take showers.  · Keep all follow-up visits as told  by your health care provider. This is important.  Contact a health care provider if you have:  · Pain when you urinate.  · Redness, swelling, or pain around an incision.  · Fluid or blood coming from an incision.  · Pus or a bad smell coming from an incision.  · An incision that feels warm to the touch.  · A fever.  · Abdominal pain that gets worse or does not get better with medicine.  · An incision that starts to break open.  · A rash.  · Light-headedness.  · Nausea and vomiting.  Get help right away if you:  · Have pain in your chest or leg.  · Develop shortness of breath.  · Faint.  · Have increased or heavy vaginal bleeding, such as soaking a pad in an hour.  Summary  · After the procedure, it is common to feel tired, have some pain in your abdomen, and have some light vaginal bleeding for a few days.  · Follow instructions from your health care provider about how to take care of your incisions.  · Return to your normal activities as told by your health care provider. Ask your health care provider what activities are safe for you.  · Do not douche, use tampons, or have sex until your health care provider approves.  · Keep all follow-up visits as told by your health care provider.  This information is not intended to replace advice given to you by your health care provider. Make sure you discuss any questions you have with your health care provider.      DIET: To avoid nausea, slowly advance diet as tolerated, avoiding spicy or greasy foods for the first day.  Add more substantial food to your diet according to your physician's instructions.  Babies can be fed formula or breast milk as soon as they are hungry.  INCREASE FLUIDS AND FIBER TO AVOID CONSTIPATION.    SURGICAL DRESSING/BATHING: May shower. No hot tubs, tub baths or pools until cleared by MD    FOLLOW-UP APPOINTMENT:  A follow-up appointment should be arranged with your doctor in 1-2 weeks; call to schedule.    You should CALL YOUR PHYSICIAN if you  develop:  Fever greater than 101 degrees F.  Pain not relieved by medication, or persistent nausea or vomiting.  Excessive bleeding (blood soaking through dressing) or unexpected drainage from the wound.  Extreme redness or swelling around the incision site, drainage of pus or foul smelling drainage.  Inability to urinate or empty your bladder within 8 hours.  Problems with breathing or chest pain.    You should call 911 if you develop problems with breathing or chest pain.  If you are unable to contact your doctor or surgical center, you should go to the nearest emergency room or urgent care center.  Physician's telephone #: [phone] Dr Osorio    If any questions arise, call your doctor.  If your doctor is not available, please feel free to call the Surgical Center at [phone].  The Center is open Monday through Friday from 7AM to 7PM.  You can also call the [employer] [facility] [employer] HOTLINE open 24 hours/day, 7 days/week and speak to a nurse at [phone], or toll free at [phone].    A registered nurse may call you a few days after your surgery to see how you are doing after your procedure.    MEDICATIONS: Resume taking daily medication.  Take prescribed pain medication with food.  If no medication is prescribed, you may take non-aspirin pain medication if needed.  PAIN MEDICATION CAN BE VERY CONSTIPATING.  Take a stool softener or laxative such as senokot, pericolace, or milk of magnesia if needed.    Prescription given for percocet and motrin for pain.  Last pain medication given at 2:36pm.    If your physician has prescribed pain medication that includes Acetaminophen (Tylenol), do not take additional Acetaminophen (Tylenol) while taking the prescribed medication.    Depression / Suicide Risk    As you are discharged from this [facility] facility, it is important to learn how to keep safe from harming yourself.    Recognize the warning signs:  · Abrupt changes in personality, positive or negative-  including increase in energy   · Giving away possessions  · Change in eating patterns- significant weight changes-  positive or negative  · Change in sleeping patterns- unable to sleep or sleeping all the time   · Unwillingness or inability to communicate  · Depression  · Unusual sadness, discouragement and loneliness  · Talk of wanting to die  · Neglect of personal appearance   · Rebelliousness- reckless behavior  · Withdrawal from people/activities they love  · Confusion- inability to concentrate     If you or a loved one observes any of these behaviors or has concerns about self-harm, here's what you can do:  · Talk about it- your feelings and reasons for harming yourself  · Remove any means that you might use to hurt yourself (examples: pills, rope, extension cords, firearm)  · Get professional help from the community (Mental Health, Substance Abuse, psychological counseling)  · Do not be alone:Call your Safe Contact- someone whom you trust who will be there for you.  · Call your local CRISIS HOTLINE 567-7175 or 131-516-9717  · Call your local Children's Mobile Crisis Response Team [place] (349) 503-0297 or www.[employer]  · Call the toll free National Suicide Prevention Hotlines   · National Suicide Prevention Lifeline 569-233-JBZL (9652)  · National Hope Line Network 800-SUICIDE (103-0904)

## 2020-09-02 NOTE — ANESTHESIA PROCEDURE NOTES
Airway    Date/Time: 9/2/2020 1:42 PM  Performed by: Te Khan M.D.  Authorized by: Te Khan M.D.     Location:  OR  Urgency:  Elective  Indications for Airway Management:  Anesthesia      Spontaneous Ventilation: absent    Sedation Level:  Deep  Preoxygenated: Yes    Patient Position:  Sniffing  Mask Difficulty Assessment:  2 - vent by mask + OA or adjuvant +/- NMBA  Final Airway Type:  Endotracheal airway  Final Endotracheal Airway:  ETT  Cuffed: Yes    Technique Used for Successful ETT Placement:  Direct laryngoscopy    Insertion Site:  Oral  Blade Type:  Skyler  Laryngoscope Blade/Videolaryngoscope Blade Size:  3  ETT Size (mm):  7.0  Measured from:  Teeth  ETT to Teeth (cm):  22  Placement Verified by: capnometry    Cormack-Lehane Classification:  Grade IIb - view of arytenoids or posterior of glottis only  Number of Attempts at Approach:  1

## 2020-09-02 NOTE — OR NURSING
1530: Assumed care of patient from PACU, see flow sheets for vital signs. Patient alert and oriented times four. Assessment completed. Surgical incision assessed, dressing clean, dry and intact. Pain is controlled and tolerable for patient. Patient updated of plan of care for discharge. Family/friend at bedside with patient. Discharge instructions reviewed and all questions answered. All needs met, patient dressed and safe to discharge home.   Patient tolerating fluids and food with no nausea.    1550: Patient ready to go, last vital signs in flow sheet. PIV removed. Patient taken to car in wheelchair. Patient safe to discharge.

## 2020-09-02 NOTE — ANESTHESIA TIME REPORT
Anesthesia Start and Stop Event Times     Date Time Event    9/2/2020 1327 Ready for Procedure     1333 Anesthesia Start     1430 Anesthesia Stop        Responsible Staff  09/02/20    Name Role Begin End    Min EMANUEL Khan M.D. Anesth 1333 1430        Preop Diagnosis (Free Text):  Pre-op Diagnosis     DESIRES STERILIZATION        Preop Diagnosis (Codes):    Post op Diagnosis  Sterilization      Premium Reason  Non-Premium    Comments:

## 2020-09-03 NOTE — OP REPORT
DATE OF SERVICE:  2020    This patient is a 39 years old  2, para 2 with 2 normal vaginal   deliveries, desires sterilization.  The patient currently on Depo shot for   backup contraception.    PREOPERATIVE DIAGNOSIS:  Desires sterilization.    POSTOPERATIVE DIAGNOSIS:  Desires sterilization.    PROCEDURE PERFORMED:  Laparoscopic bilateral salpingectomy.    ANESTHESIA:  General endotracheal tube.    ANESTHESIOLOGIST:  ____    SURGEON:  Garrett Osorio MD    SPECIMEN:  Bilateral tubes.    FINDINGS:  During laparoscopy, the uterus was noted to be normal.  Both tubes   and left ovary was normal.  Right ovary was enlarged with simple cyst.  On   assessment of the upper abdomen, liver, gallbladder, spleen, omentum and   peritoneal surfaces of the bowel were all unremarkable in appearance.    PROCEDURE:  The patient was taken to the operating room where general   anesthesia was induced without difficulty.  The patient was then placed in the   dorsal lithotomy position, prepped and draped in a sterile fashion.  Bladder   was straight catheterized, 100 mL of clear urine was drained.  A bivalve   speculum was then placed in the patient's vagina.  Anterior lip of the cervix   was held with single tooth tenaculum.  Uterus sounded to 8 cm.  A Imagine K12lka   manipulator was introduced and held in place for uterine manipulation.  The   rest of the instruments were removed.  Attention was then turned to the   patient's abdomen.  After re-gloving, infraumbilical skin infiltrated with   Marcaine with epinephrine.  A 10 mm skin incision was made in the umbilical   fold.  Veress needle was carefully introduced into the peritoneal cavity at a   45-degree angle while tenting up the abdominal wall.  Intraperitoneal   placement was confirmed by low pressure peritoneal cavity.  Pneumoperitoneum   was obtained with 4 liters of CO2 gas and a 10 mm trocar and sleeve were then   advanced without difficulty into the abdomen where  intraabdominal placement   was confirmed by the laparoscope.  Abdomen was explored and the findings as   noted.  No injury or bleeding under the port site or entry track noted.  The   patient changed to Trendelenburg position.  Remaining 5 mm suprapubic port was   placed under direct visualization.  Attention was directed towards the   salpingectomy.  The right tube was held with Prestige, the mesosalpinx was   exposed, grasped with the LigaSure, clamped, coagulated and cut.  Dissection   was carried out in the mesosalpinx parallel to the tube clamping, coagulation   and cutting until about 3 cm from the uterine cornu.  Then, the segment of the   tube was grasped with the LigaSure, clamped, coagulated and cut.  The tube   was brought out through the umbilical port and sent for pathology.  Same was   repeated on the other side and the tube was sent for pathology.  Hemostasis   was reaffirmed.  Pneumoperitoneum was released.  All the instruments were   removed.  Umbilical port fascia was closed with 0 Vicryl and the skin of both   the ports were closed with 4-0 Monocryl.  The patient withstood the procedure   well, was extubated and transferred to recovery room under stable condition.       ____________________________________     MD RAINA Moran / MARIELY    DD:  09/02/2020 14:31:39  DT:  09/02/2020 17:30:23    D#:  [id number]  Job#:  [id number]

## 2020-09-21 ENCOUNTER — OFFICE VISIT (OUTPATIENT)
Dept: URGENT CARE | Facility: CLINIC | Age: 40
End: 2020-09-21
Payer: MEDICARE

## 2020-09-21 VITALS
OXYGEN SATURATION: 97 % | HEART RATE: 98 BPM | WEIGHT: 232 LBS | TEMPERATURE: 97.3 F | HEIGHT: 67 IN | RESPIRATION RATE: 16 BRPM | DIASTOLIC BLOOD PRESSURE: 82 MMHG | SYSTOLIC BLOOD PRESSURE: 132 MMHG | BODY MASS INDEX: 36.41 KG/M2

## 2020-09-21 DIAGNOSIS — K08.89 TOOTH PAIN: ICD-10-CM

## 2020-09-21 DIAGNOSIS — K04.7 DENTAL ABSCESS: ICD-10-CM

## 2020-09-21 PROCEDURE — 99214 OFFICE O/P EST MOD 30 MIN: CPT | Performed by: FAMILY MEDICINE

## 2020-09-21 RX ORDER — AMOXICILLIN AND CLAVULANATE POTASSIUM 875; 125 MG/1; MG/1
1 TABLET, FILM COATED ORAL 2 TIMES DAILY
Qty: 14 TAB | Refills: 0 | Status: SHIPPED | OUTPATIENT
Start: 2020-09-21 | End: 2020-09-28

## 2020-09-21 RX ORDER — NAPROXEN 500 MG/1
500 TABLET ORAL 2 TIMES DAILY WITH MEALS
Qty: 20 TAB | Refills: 0 | Status: SHIPPED | OUTPATIENT
Start: 2020-09-21 | End: 2020-10-01

## 2020-09-21 ASSESSMENT — FIBROSIS 4 INDEX: FIB4 SCORE: .6964285714285714286

## 2020-09-21 ASSESSMENT — ENCOUNTER SYMPTOMS
SORE THROAT: 0
COUGH: 0
CHILLS: 0
VOMITING: 0
FEVER: 0
NAUSEA: 0
MYALGIAS: 0
SHORTNESS OF BREATH: 0

## 2020-09-21 NOTE — PROGRESS NOTES
Subjective:   Lyssa Leblanc is a 39 y.o. female who presents for Jaw Pain (with ear and tooth pain on the left side symptoms started a couple weeks ago. Patient has an appointment with dentist 9/28/2020)        39-year-old presents to the urgent care with a chief complaint of left lower jaw and tooth pain over the past week.  Patient has appointment with the dentist on 9/20/2020.  Patient has a history of recurrent dental abscess which have improved symptomatically with previous antibiotics.  Patient denies fevers chills sweats at this time.  Patient denies traumatic injury to the jaw face or teeth.    Dental Injury  This is a recurrent problem. Pertinent negatives include no chills, coughing, fever, myalgias, nausea, rash, sore throat or vomiting. The symptoms are aggravated by eating (Mastication). She has tried rest (Warm compress) for the symptoms. The treatment provided no relief.     PMH:  has a past medical history of Asthma, Seizure ([facility]) (2019), and Stroke ([facility]).  MEDS:   Current Outpatient Medications:   •  amoxicillin-clavulanate (AUGMENTIN) 875-125 MG Tab, Take 1 Tab by mouth 2 times a day for 7 days., Disp: 14 Tab, Rfl: 0  •  naproxen (NAPROSYN) 500 MG Tab, Take 1 Tab by mouth 2 times a day, with meals for 10 days., Disp: 20 Tab, Rfl: 0  •  TEGRETOL  MG TABLET SR 12 HR, TAKE 1 TABLET BY MOUTH TWICE A DAY. NAME BRAND MEDICALLY NECESSARY., Disp: 180 Tab, Rfl: 1  •  mirtazapine (REMERON) 7.5 MG tablet, Take 1 Tab by mouth every bedtime., Disp: , Rfl:   •  hydrOXYzine HCl (ATARAX) 25 MG Tab, Take 50 mg by mouth 2 Times a Day., Disp: , Rfl:   •  divalproex (DEPAKOTE) 250 MG Tablet Delayed Response, Take 5 tabs PO BID.  Name Brand Medically Necessary. (Patient taking differently: Take  by mouth 2 Times a Day. Pt takes 2 tablets in am and 5 tablets in pm.), Disp: 300 Tab, Rfl: 1  •  acetaminophen (TYLENOL) 500 MG Tab, Take 1,000 mg by mouth every 6 hours as needed., Disp: , Rfl:   •  albuterol  "108 (90 Base) MCG/ACT Aero Soln inhalation aerosol, Inhale 1-2 Puffs by mouth every four hours as needed for Shortness of Breath. (Patient not taking: Reported on 9/21/2020), Disp: 1 Inhaler, Rfl: 0  ALLERGIES: No Known Allergies  SURGHX:   Past Surgical History:   Procedure Laterality Date   • PB LAP,DIAGNOSTIC ABDOMEN N/A 9/2/2020    Procedure: PELVISCOPY;  Surgeon: Garrett Osorio M.D.;  Location: SURGERY [facility];  Service: Gynecology   • SALPINGECTOMY Bilateral 9/2/2020    Procedure: SALPINGECTOMY;  Surgeon: Garrett Osorio M.D.;  Location: SURGERY [facility];  Service: Gynecology     SOCHX:  reports that she has been smoking cigarettes. She has a 0.40 pack-year smoking history. She has never used smokeless tobacco. She reports that she does not drink alcohol or use drugs.  FH:   Family History   Problem Relation Age of Onset   • Heart Disease Mother    • Diabetes Mother      Review of Systems   Constitutional: Negative for chills and fever.   HENT: Negative for sore throat.    Respiratory: Negative for cough and shortness of breath.    Gastrointestinal: Negative for nausea and vomiting.   Musculoskeletal: Negative for myalgias.   Skin: Negative for rash.   All other systems reviewed and are negative.       Objective:   /82 (BP Location: Left arm, Patient Position: Sitting, BP Cuff Size: Adult)   Pulse 98   Temp 36.3 °C (97.3 °F) (Temporal)   Resp 16   Ht 1.702 m (5' 7\")   Wt 105.2 kg (232 lb)   SpO2 97%   Breastfeeding No Comment: Tubal ligation   BMI 36.34 kg/m²   Physical Exam  Vitals signs and nursing note reviewed.   Constitutional:       General: She is not in acute distress.     Appearance: Normal appearance. She is well-developed.   HENT:      Head: Normocephalic and atraumatic.      Jaw: Pain on movement present. No trismus.        Right Ear: External ear normal.      Left Ear: External ear normal.      Nose: Nose normal.      Mouth/Throat:      Mouth: Mucous membranes are " moist.      Dentition: Abnormal dentition. Dental tenderness, gingival swelling, dental caries and dental abscesses present.     Eyes:      Conjunctiva/sclera: Conjunctivae normal.   Cardiovascular:      Rate and Rhythm: Normal rate.   Pulmonary:      Effort: Pulmonary effort is normal. No respiratory distress.      Breath sounds: Normal breath sounds.   Abdominal:      General: There is no distension.   Musculoskeletal: Normal range of motion.   Skin:     General: Skin is warm and dry.   Neurological:      General: No focal deficit present.      Mental Status: She is alert and oriented to person, place, and time. Mental status is at baseline.      Gait: Gait (gait at baseline) normal.   Psychiatric:         Judgment: Judgment normal.           Assessment/Plan:   1. Dental abscess  - amoxicillin-clavulanate (AUGMENTIN) 875-125 MG Tab; Take 1 Tab by mouth 2 times a day for 7 days.  Dispense: 14 Tab; Refill: 0  - naproxen (NAPROSYN) 500 MG Tab; Take 1 Tab by mouth 2 times a day, with meals for 10 days.  Dispense: 20 Tab; Refill: 0    2. Tooth pain    Advised symptomatic and supportive measures, follow-up with dentist and/or oral surgeon as scheduled for definitive management, warm compress    Discussed close monitoring, return precautions, and supportive measures of maintaining adequate fluid hydration and caloric intake, relative rest and symptom management as needed for pain and/or fever.    Differential diagnosis, natural history, supportive care, and indications for immediate follow-up discussed.     Advised the patient to follow-up with the primary care physician for recheck, reevaluation, and consideration of further management.    Please note that this dictation was created using voice recognition software. I have worked with consultants from the vendor as well as technical experts from [employer] to optimize the interface. I have made every reasonable attempt to correct obvious errors, but I expect that  there are errors of grammar and possibly content that I did not discover before finalizing the note.

## 2020-10-26 ENCOUNTER — OFFICE VISIT (OUTPATIENT)
Dept: NEUROLOGY | Facility: MEDICAL CENTER | Age: 40
End: 2020-10-26
Payer: MEDICARE

## 2020-10-26 ENCOUNTER — TELEPHONE (OUTPATIENT)
Dept: NEUROLOGY | Facility: MEDICAL CENTER | Age: 40
End: 2020-10-26

## 2020-10-26 VITALS
HEART RATE: 72 BPM | SYSTOLIC BLOOD PRESSURE: 146 MMHG | TEMPERATURE: 97.6 F | BODY MASS INDEX: 36.89 KG/M2 | HEIGHT: 67 IN | OXYGEN SATURATION: 98 % | WEIGHT: 235.01 LBS | DIASTOLIC BLOOD PRESSURE: 82 MMHG | RESPIRATION RATE: 16 BRPM

## 2020-10-26 DIAGNOSIS — F39 MOOD DISORDER (HCC): ICD-10-CM

## 2020-10-26 DIAGNOSIS — G81.11 RIGHT SPASTIC HEMIPLEGIA (HCC): ICD-10-CM

## 2020-10-26 DIAGNOSIS — G40.909 SEIZURE DISORDER (HCC): ICD-10-CM

## 2020-10-26 DIAGNOSIS — F81.9 LEARNING DISABILITY: ICD-10-CM

## 2020-10-26 DIAGNOSIS — Z86.73 HISTORY OF STROKE: ICD-10-CM

## 2020-10-26 PROCEDURE — 99213 OFFICE O/P EST LOW 20 MIN: CPT | Performed by: NURSE PRACTITIONER

## 2020-10-26 RX ORDER — LISINOPRIL 10 MG/1
10 TABLET ORAL DAILY
COMMUNITY
End: 2021-09-13 | Stop reason: SDUPTHER

## 2020-10-26 RX ORDER — CARBAMAZEPINE 400 MG/1
TABLET, EXTENDED RELEASE ORAL
Qty: 180 TAB | Refills: 1 | Status: SHIPPED | OUTPATIENT
Start: 2020-10-26 | End: 2021-05-17

## 2020-10-26 RX ORDER — DIVALPROEX SODIUM 250 MG/1
TABLET, DELAYED RELEASE ORAL
Qty: 150 TAB | Refills: 5 | Status: SHIPPED | OUTPATIENT
Start: 2020-10-26 | End: 2020-12-18 | Stop reason: SDUPTHER

## 2020-10-26 ASSESSMENT — ENCOUNTER SYMPTOMS
DEPRESSION: 0
DIARRHEA: 0
NAUSEA: 0
ABDOMINAL PAIN: 0
VOMITING: 0
HEADACHES: 0
SEIZURES: 0
CONSTITUTIONAL NEGATIVE: 1
SORE THROAT: 0
COUGH: 0
DOUBLE VISION: 0

## 2020-10-26 ASSESSMENT — FIBROSIS 4 INDEX: FIB4 SCORE: .7142857142857142857

## 2020-10-26 NOTE — PROGRESS NOTES
Subjective:      Lyssa Leblanc is a 40 y.o. female who presents with Follow-Up (seizure disorder .no seizures . )            HPI  She is doing well overall.  Here today to discuss the medication management.    Headaches:   Not a concern at this time.    9/2/2020 tubal ligation performed and healed well.    EKG 8/2020 Normal sinus rhythm.    Started lisinopril 9/29/2020 10mg qam.  Has a follow-up 11/17/2020 for blood pressure management.    Appointment scheduled for two dental extractions tomorrow.  Followed per [facility].  She has been suffering with dental infection for 4-5 weeks.  Taking antibiotics.    Plans to have a liver ultrasound-- 8/2020 urinalysis with bilirubin.    4/2019 Dr East INTERPRETATION:   This is an abnormal video EEG recording in the awake state.     There is slowing and attenuation in the left cerebral hemisphere,   likely suggestive of an underlying structural abnormality. No   seizures captured during this study.  Clinical and radiological   correlation is recommended.     No MVI  Current Outpatient Medications   Medication Sig Dispense Refill   • lisinopril (PRINIVIL) 10 MG Tab Take 10 mg by mouth every day. Takes in am     • TEGRETOL  MG TABLET SR 12 HR TAKE 1 TABLET BY MOUTH TWICE A DAY. NAME BRAND MEDICALLY NECESSARY. 180 Tab 1   • mirtazapine (REMERON) 7.5 MG tablet Take 1 Tab by mouth every bedtime.     • hydrOXYzine HCl (ATARAX) 25 MG Tab Take 50 mg by mouth 2 Times a Day.     • acetaminophen (TYLENOL) 500 MG Tab Take 1,000 mg by mouth every 6 hours as needed.     • albuterol 108 (90 Base) MCG/ACT Aero Soln inhalation aerosol Inhale 1-2 Puffs by mouth every four hours as needed for Shortness of Breath. (Patient not taking: Reported on 9/21/2020) 1 Inhaler 0   • divalproex (DEPAKOTE) 250 MG Tablet Delayed Response Take 5 tabs PO BID.  Name Brand Medically Necessary. (Patient taking differently: Take  by mouth 2 Times a Day. and 5 tablets in pm.) 300 Tab 1     No  "current facility-administered medications for this visit.          Review of Systems   Constitutional: Negative.  Weight loss: weight gain of 5#   HENT: Negative for hearing loss, nosebleeds and sore throat.         No recent head injury.   Eyes: Negative for double vision.        No new loss of vision.   Respiratory: Negative for cough.         No recent lung infections.   Cardiovascular: Negative for chest pain.   Gastrointestinal: Negative for abdominal pain, diarrhea, nausea and vomiting.   Genitourinary: Negative.    Musculoskeletal: Positive for joint pain (right knee specifically).   Skin: Negative.    Neurological: Negative for seizures and headaches.   Endo/Heme/Allergies:        No history of endocrine dysfunction.  No new problems.   Psychiatric/Behavioral: Negative for depression. The patient is nervous/anxious.         No recent mood changes.          Objective:     /82 (BP Location: Left arm)   Pulse 72   Temp 36.4 °C (97.6 °F) (Temporal)   Resp 16   Ht 1.702 m (5' 7\")   Wt 106.6 kg (235 lb 0.2 oz)   SpO2 98%   BMI 36.81 kg/m²      Physical Exam  Constitutional:       Appearance: She is well-developed. She is obese.   HENT:      Head: Normocephalic and atraumatic.   Eyes:      Pupils: Pupils are equal, round, and reactive to light.   Neck:      Musculoskeletal: Normal range of motion.   Cardiovascular:      Rate and Rhythm: Normal rate and regular rhythm.   Pulmonary:      Effort: Pulmonary effort is normal.   Skin:     General: Skin is warm.      Coloration: Skin is pale.   Neurological:      Mental Status: She is alert and oriented to person, place, and time.      Motor: No abnormal muscle tone.      Coordination: Coordination abnormal ( h/o right tendon release, right hemiparesis).      Gait: Gait abnormal.      Deep Tendon Reflexes: Reflexes abnormal.      Reflex Scores:       Tricep reflexes are 3+ on the right side and 2+ on the left side.       Bicep reflexes are 3+ on the right " side and 2+ on the left side.       Brachioradialis reflexes are 3+ on the right side and 2+ on the left side.     Comments: No observable changes in neurologic status.  See initial new patient examination for details.    Psychiatric:         Attention and Perception: She is inattentive.         Mood and Affect: Affect is blunt.         Speech: Speech is rapid and pressured.      Comments: Learning delayed, outspoken today                   Assessment/Plan:        Seizure Disorder, s/p  intracranial hemorrhage:  History of seizures since birth.  Last known seizure was during labor and delivery in .     No Brain MRI on record.       Ref. Range 2019 06:41   Carbamazepine, S Latest Ref Range: 4.0 - 12.0 ug/mL 6.6   Valproic Acid Latest Ref Range: 50 - 100 ug/mL 80   25-Hydroxy   Vitamin D 25 Latest Ref Range: 30.0 - 100.0 ng/mL 26.1 (L)         Successfully reduced Depakote intercurrently.  Has no concerns for seizures or unusual feeling/aura.     Continue Tegretol XR 400mg BID.    Continue Depakote ER 1250mg nightly, 250mg capsules preferred.       Hyperbilirubinemia with normal LFT's.  Counseled regarding healthy lifestyle including dietary needs and portions as well as activity and exercise.     Osteopenia:  Needs to be taking PNV, calcium 2000mg and Vit D3 2000IU per day.  She is not taking supplementation at this time.  Due for a DEXA comparative study in .     Will need labs with the next appointment.  To call with any concern for seizures which we reviewed.     Return for follow-up in 4 months.     EDUCATION AND COUNSELING:  -Education was provided to the patient and/or family regarding diagnosis and prognosis. The chronic and unpredictable nature of the condition was discussed. There is increased risk for additional events, which may carry potential for significant injuries and death.    -We reviewed the current antiepileptic regimen. Potential side effects of antiepileptics were discussed  at length, including but no limited to: hypersensitivity reactions (rash and others, some of which can be fatal), visual field changes (some of which may be irreversible), glaucoma, diplopia, kidney stones, osteopenia/osteoporosis/bone fractures, hyperthermia/anhydrosis, tremors, ataxia, dizziness, fatigue, increased risk for falls, cardiac arrhythmias/syncope, gastrointestinal (hepatitis, pancreatitis, gastritis, ulcers), gingival hypertrophy, drowsiness, sedation, anxiety/nervousness, increased risk for suicide, increased risk for depression, and psychosis. We reviewed drug-drug interactions and their potential effect on seizure control and medication side effects.    -Patient/family educated on SUDEP (Sudden Death in Epilepsy). Counseling was provided on the importance of strict medication and follow up compliance. The patient understands the risks associated with non-adherence with the medical plan as outlined, including but not limited to an increased risk for breakthrough seizures, which may contribute to injuries, disability, status epilepticus, and even death.    -Counseling was also provided on potential effects of alcohol and other drugs, which may lower seizure threshold and/or affect the metabolism of antiepileptic drugs. I recommend avoidance of alcohol and illegal drugs.  -Recommend proper hydration, regular exercise, proper sleep hygiene (7-8 hrs of overnight sleep, avoid sleep deprivation).    -I have made the patient aware of mandatory reporting required by the law in the State of [place] regarding episodes of seizures, loss of consciousness, and/or alteration of awareness. The patient and family are responsible for reporting events to the DMV, instructions were provided. The patient verbalized understanding and states she has not been driving. Other seizure precautions were discussed at length, including no diving, no skydiving, no unsupervised swimming, no Jacuzzi or bathing in bathtubs.    Pt  agrees with plan.

## 2020-10-27 ENCOUNTER — TELEPHONE (OUTPATIENT)
Dept: NEUROLOGY | Facility: MEDICAL CENTER | Age: 40
End: 2020-10-27

## 2020-10-27 NOTE — TELEPHONE ENCOUNTER
Please let case coordinator know that Lyssa's lab results show increased liver enzymes and cholesterol.  Recommend they further discuss with PCP.

## 2020-11-02 ASSESSMENT — ENCOUNTER SYMPTOMS: NERVOUS/ANXIOUS: 1

## 2020-11-18 ENCOUNTER — TELEPHONE (OUTPATIENT)
Dept: NEUROLOGY | Facility: MEDICAL CENTER | Age: 40
End: 2020-11-18

## 2020-11-18 NOTE — TELEPHONE ENCOUNTER
"Sent via [employer]     \"Good evening I need someone to call back or message me back please . I need to talk to someone about my Tegretol XR  because the pharmacy willn't fill it because of my insurance isn't covered it  Thank you     Lyssa Leblanc\"     Prior authorization was submitted ? Is their anything else that could be done for patient ?   "

## 2020-11-18 NOTE — TELEPHONE ENCOUNTER
----- Message from Lyssa Leblanc sent at 11/17/2020  2:47 PM PST -----  Regarding: Prescription Question  Contact: [phone]  Good evening I need someone to call back or message me back please . I need to talk to someone about my Tegretol XR  because the pharmacy willn't fill it because of my insurance isn't covered it  Thank you    Lyssa Leblanc

## 2020-12-17 ENCOUNTER — TELEPHONE (OUTPATIENT)
Dept: NEUROLOGY | Facility: MEDICAL CENTER | Age: 40
End: 2020-12-17

## 2020-12-18 DIAGNOSIS — G40.909 SEIZURE DISORDER (HCC): ICD-10-CM

## 2020-12-18 RX ORDER — DIVALPROEX SODIUM 250 MG/1
TABLET, DELAYED RELEASE ORAL
Qty: 150 TAB | Refills: 0 | Status: SHIPPED | OUTPATIENT
Start: 2020-12-18 | End: 2021-02-22

## 2020-12-19 NOTE — TELEPHONE ENCOUNTER
Patient presented today requesting an emergent refill of Depakote, evidently the patient ran out medication 2 days ago.  Review of records indicated a pending order for the name brand formulation of Depakote 250 mg delayed release tablets was made yesterday.  This was renewed today for the next month without refills.

## 2020-12-21 ENCOUNTER — TELEPHONE (OUTPATIENT)
Dept: NEUROLOGY | Facility: MEDICAL CENTER | Age: 40
End: 2020-12-21

## 2020-12-21 DIAGNOSIS — G40.909 SEIZURE DISORDER (HCC): ICD-10-CM

## 2020-12-21 RX ORDER — DIVALPROEX SODIUM 250 MG
TABLET, EXTENDED RELEASE 24 HR ORAL
Qty: 450 TAB | Refills: 0 | Status: SHIPPED | OUTPATIENT
Start: 2020-12-21 | End: 2021-02-09

## 2020-12-21 NOTE — TELEPHONE ENCOUNTER
Patient and pharmacy called and stated that patient has been taking and is requesingg Depakote ER instead of DR.     Please advise they are asking for a new RX.

## 2021-02-22 ENCOUNTER — OFFICE VISIT (OUTPATIENT)
Dept: NEUROLOGY | Facility: MEDICAL CENTER | Age: 41
End: 2021-02-22
Attending: NURSE PRACTITIONER
Payer: MEDICARE

## 2021-02-22 VITALS
TEMPERATURE: 98.4 F | HEIGHT: 67 IN | DIASTOLIC BLOOD PRESSURE: 86 MMHG | OXYGEN SATURATION: 96 % | SYSTOLIC BLOOD PRESSURE: 130 MMHG | WEIGHT: 226.41 LBS | RESPIRATION RATE: 16 BRPM | HEART RATE: 85 BPM | BODY MASS INDEX: 35.54 KG/M2

## 2021-02-22 DIAGNOSIS — F81.9 LEARNING DISABILITY: ICD-10-CM

## 2021-02-22 DIAGNOSIS — D50.8 OTHER IRON DEFICIENCY ANEMIA: ICD-10-CM

## 2021-02-22 DIAGNOSIS — G40.909 SEIZURE DISORDER (HCC): ICD-10-CM

## 2021-02-22 DIAGNOSIS — M85.80 OSTEOPENIA, UNSPECIFIED LOCATION: ICD-10-CM

## 2021-02-22 DIAGNOSIS — R79.89 LOW VITAMIN D LEVEL: ICD-10-CM

## 2021-02-22 DIAGNOSIS — G81.11 RIGHT SPASTIC HEMIPLEGIA (HCC): ICD-10-CM

## 2021-02-22 DIAGNOSIS — E66.9 CLASS 2 OBESITY WITHOUT SERIOUS COMORBIDITY WITH BODY MASS INDEX (BMI) OF 36.0 TO 36.9 IN ADULT, UNSPECIFIED OBESITY TYPE: ICD-10-CM

## 2021-02-22 DIAGNOSIS — Z86.73 HISTORY OF STROKE: ICD-10-CM

## 2021-02-22 DIAGNOSIS — F39 MOOD DISORDER (HCC): ICD-10-CM

## 2021-02-22 PROCEDURE — 99214 OFFICE O/P EST MOD 30 MIN: CPT | Performed by: NURSE PRACTITIONER

## 2021-02-22 PROCEDURE — 99211 OFF/OP EST MAY X REQ PHY/QHP: CPT | Performed by: NURSE PRACTITIONER

## 2021-02-22 ASSESSMENT — ENCOUNTER SYMPTOMS
VOMITING: 0
HEADACHES: 0
DEPRESSION: 0
SEIZURES: 0
DOUBLE VISION: 0
SORE THROAT: 0
MUSCULOSKELETAL NEGATIVE: 1
COUGH: 0
FALLS: 0
NAUSEA: 0
WEIGHT LOSS: 1
DIZZINESS: 1
ABDOMINAL PAIN: 0
DIARRHEA: 0
NERVOUS/ANXIOUS: 1

## 2021-02-22 ASSESSMENT — PATIENT HEALTH QUESTIONNAIRE - PHQ9: CLINICAL INTERPRETATION OF PHQ2 SCORE: 0

## 2021-02-22 ASSESSMENT — FIBROSIS 4 INDEX: FIB4 SCORE: .7142857142857142857

## 2021-02-22 NOTE — PROGRESS NOTES
"Subjective:      Lyssa Leblanc is a 40 y.o. female who presents with No chief complaint on file.        Present today with an older woman, mother?  Not a conversationalist.      HPI  She is doing well overall. She has had some changes in her life such as a change in Depakote from ER to DR and she noticed a significant problem right away.     Headaches:   The other day she had a \"really bad headache\" for which she took Tylenol.  Unclear if she has had routine or occasional headaches.  She doesn't have recall of other headacehs.     EKG 8/2020 Normal sinus rhythm.     Started lisinopril 9/29/2020 10mg qam and reports that when her blood pressure has been taken that it has been \"low\".    4/2019 Dr East INTERPRETATION:   This is an abnormal video EEG recording in the awake state.     There is slowing and attenuation in the left cerebral hemisphere,   likely suggestive of an underlying structural abnormality. No   seizures captured during this study.  Clinical and radiological   correlation is recommended.        9/2/2020 tubal ligation performed and healed well. Post-menopausal.    8/2018 IMPRESSION:     According to the World Health Organization classification, bone mineral density of this patient is osteopenia with increased risk of fracture.        Ref. Range 8/26/2019 06:41   25-Hydroxy   Vitamin D 25 Latest Ref Range: 30.0 - 100.0 ng/mL 26.1 (L)       No MVI  Current Outpatient Medications   Medication Sig Dispense Refill   • DEPAKOTE  MG TABLET SR 24 HR TAKE 5 TABLETS BY MOUTH AT BEDTIME (NAME BRAND MEDICALLY NECESSARY). 450 Tab 1   • DEPAKOTE 250 MG Tablet Delayed Response TAKE 5 TABLETS BY MOUTH AT BEDTIME (NAME BRAND MEDICALLY NECESSARY) 450 Tab 1   • divalproex (DEPAKOTE) 250 MG Tablet Delayed Response Take 5 tabs PO QHS.  Name Brand Medically Necessary. 150 Tab 0   • lisinopril (PRINIVIL) 10 MG Tab Take 10 mg by mouth every day. Takes in am     • TEGRETOL  MG TABLET SR 12 HR Take one " tablet PO BID. 180 Tab 1   • acetaminophen (TYLENOL) 500 MG Tab Take 1,000 mg by mouth every 6 hours as needed.     • mirtazapine (REMERON) 7.5 MG tablet Take 1 Tab by mouth every bedtime.     • hydrOXYzine HCl (ATARAX) 25 MG Tab Take 50 mg by mouth 2 Times a Day.       No current facility-administered medications for this visit.         Review of Systems   Constitutional: Positive for weight loss (8 pounds intercurrently).   HENT: Negative for hearing loss, nosebleeds and sore throat.         No recent head injury.   Eyes: Negative for double vision.        No new loss of vision.   Respiratory: Negative for cough.         No recent lung infections.   Cardiovascular: Negative for chest pain.   Gastrointestinal: Negative for abdominal pain, diarrhea, nausea and vomiting.   Genitourinary: Negative.    Musculoskeletal: Negative.  Negative for falls.   Skin: Negative.    Neurological: Positive for dizziness (occasional, attributed to being anemic). Negative for seizures and headaches.   Endo/Heme/Allergies:        No history of endocrine dysfunction.  No new problems.   Psychiatric/Behavioral: Negative for depression. The patient is nervous/anxious.         No recent mood changes.          Objective:     There were no vitals taken for this visit.     Physical Exam  Constitutional:       General: She is not in acute distress.     Appearance: She is obese.   HENT:      Head: Normocephalic and atraumatic.   Eyes:      Pupils: Pupils are equal, round, and reactive to light.   Cardiovascular:      Rate and Rhythm: Normal rate and regular rhythm.   Pulmonary:      Effort: Pulmonary effort is normal. No respiratory distress.      Breath sounds: Normal breath sounds.   Musculoskeletal:         General: Normal range of motion.      Cervical back: Normal range of motion.   Skin:     General: Skin is warm and dry.      Coloration: Skin is pale.   Neurological:      Mental Status: She is alert and oriented to person, place, and  time.      Cranial Nerves: No cranial nerve deficit.      Motor: No abnormal muscle tone.      Coordination: Coordination abnormal ( h/o right tendon release, right hemiparesis).      Gait: Gait abnormal.      Deep Tendon Reflexes: Reflexes abnormal.      Reflex Scores:       Tricep reflexes are 3+ on the right side and 2+ on the left side.       Bicep reflexes are 3+ on the right side and 2+ on the left side.       Brachioradialis reflexes are 3+ on the right side and 2+ on the left side.     Comments: No observable changes in neurologic status.  See initial new patient examination for details.     Psychiatric:         Attention and Perception: She is inattentive.         Mood and Affect: Affect is blunt and angry.         Speech: Speech is rapid and pressured.         Behavior: Behavior is aggressive.      Comments: Learning delayed              Assessment/Plan:        Seizure Disorder, s/p  intracranial hemorrhage:  History of seizures since birth.  Last known seizure was during labor and delivery in .     No Brain MRI on record.    2019 Dr Eats INTERPRETATION:   This is an abnormal video EEG recording in the awake state.     There is slowing and attenuation in the left cerebral hemisphere,   likely suggestive of an underlying structural abnormality. No   seizures captured during this study.  Clinical and radiological   correlation is recommended.      Continue VPA ER 1250mg qhs and Carbamezapine XR 400mg BID.    Headaches:  Had a headache last week relieved by acetaminophen.    Osteopenia:  2018 IMPRESSION:     According to the World Health Organization classification, bone mineral density of this patient is osteopenia with increased risk of fracture.     Mood disorder/Learning Delay:  Very irritable and outspoken during our appointment today.    Dizziness:  A concern of Lyssa's at this time.  Obtain labs as ordered.      Return for follow-up in 6 months sooner if needed.

## 2021-02-26 ENCOUNTER — HOSPITAL ENCOUNTER (OUTPATIENT)
Dept: LAB | Facility: MEDICAL CENTER | Age: 41
End: 2021-02-26
Attending: NURSE PRACTITIONER
Payer: MEDICARE

## 2021-02-26 DIAGNOSIS — D50.8 OTHER IRON DEFICIENCY ANEMIA: ICD-10-CM

## 2021-02-26 DIAGNOSIS — G40.909 SEIZURE DISORDER (HCC): ICD-10-CM

## 2021-02-26 DIAGNOSIS — E66.9 CLASS 2 OBESITY WITHOUT SERIOUS COMORBIDITY WITH BODY MASS INDEX (BMI) OF 36.0 TO 36.9 IN ADULT, UNSPECIFIED OBESITY TYPE: ICD-10-CM

## 2021-02-26 DIAGNOSIS — R79.89 LOW VITAMIN D LEVEL: ICD-10-CM

## 2021-02-26 LAB
25(OH)D3 SERPL-MCNC: 17 NG/ML (ref 30–100)
ALBUMIN SERPL BCP-MCNC: 4.3 G/DL (ref 3.2–4.9)
ALBUMIN/GLOB SERPL: 1.5 G/DL
ALP SERPL-CCNC: 85 U/L (ref 30–99)
ALT SERPL-CCNC: 46 U/L (ref 2–50)
ANION GAP SERPL CALC-SCNC: 13 MMOL/L (ref 7–16)
AST SERPL-CCNC: 34 U/L (ref 12–45)
BASOPHILS # BLD AUTO: 1.2 % (ref 0–1.8)
BASOPHILS # BLD: 0.05 K/UL (ref 0–0.12)
BILIRUB SERPL-MCNC: 0.3 MG/DL (ref 0.1–1.5)
BUN SERPL-MCNC: 8 MG/DL (ref 8–22)
CALCIUM SERPL-MCNC: 9.6 MG/DL (ref 8.5–10.5)
CARBAMAZEPINE SERPL-MCNC: 10 UG/ML (ref 4–12)
CHLORIDE SERPL-SCNC: 103 MMOL/L (ref 96–112)
CO2 SERPL-SCNC: 21 MMOL/L (ref 20–33)
CREAT SERPL-MCNC: 0.71 MG/DL (ref 0.5–1.4)
EOSINOPHIL # BLD AUTO: 0.07 K/UL (ref 0–0.51)
EOSINOPHIL NFR BLD: 1.6 % (ref 0–6.9)
ERYTHROCYTE [DISTWIDTH] IN BLOOD BY AUTOMATED COUNT: 44.4 FL (ref 35.9–50)
GLOBULIN SER CALC-MCNC: 2.9 G/DL (ref 1.9–3.5)
GLUCOSE SERPL-MCNC: 94 MG/DL (ref 65–99)
HCT VFR BLD AUTO: 41 % (ref 37–47)
HGB BLD-MCNC: 13.1 G/DL (ref 12–16)
IMM GRANULOCYTES # BLD AUTO: 0.01 K/UL (ref 0–0.11)
IMM GRANULOCYTES NFR BLD AUTO: 0.2 % (ref 0–0.9)
IRON SATN MFR SERPL: 29 % (ref 15–55)
IRON SERPL-MCNC: 83 UG/DL (ref 40–170)
LYMPHOCYTES # BLD AUTO: 2.04 K/UL (ref 1–4.8)
LYMPHOCYTES NFR BLD: 47.7 % (ref 22–41)
MCH RBC QN AUTO: 29.9 PG (ref 27–33)
MCHC RBC AUTO-ENTMCNC: 32 G/DL (ref 33.6–35)
MCV RBC AUTO: 93.6 FL (ref 81.4–97.8)
MONOCYTES # BLD AUTO: 0.47 K/UL (ref 0–0.85)
MONOCYTES NFR BLD AUTO: 11 % (ref 0–13.4)
NEUTROPHILS # BLD AUTO: 1.64 K/UL (ref 2–7.15)
NEUTROPHILS NFR BLD: 38.3 % (ref 44–72)
NRBC # BLD AUTO: 0 K/UL
NRBC BLD-RTO: 0 /100 WBC
PLATELET # BLD AUTO: 188 K/UL (ref 164–446)
PMV BLD AUTO: 14.2 FL (ref 9–12.9)
POTASSIUM SERPL-SCNC: 4.4 MMOL/L (ref 3.6–5.5)
PROT SERPL-MCNC: 7.2 G/DL (ref 6–8.2)
RBC # BLD AUTO: 4.38 M/UL (ref 4.2–5.4)
SODIUM SERPL-SCNC: 137 MMOL/L (ref 135–145)
TIBC SERPL-MCNC: 284 UG/DL (ref 250–450)
UIBC SERPL-MCNC: 201 UG/DL (ref 110–370)
VALPROATE SERPL-MCNC: 59.4 UG/ML (ref 50–100)
VIT B12 SERPL-MCNC: 545 PG/ML (ref 211–911)
WBC # BLD AUTO: 4.3 K/UL (ref 4.8–10.8)

## 2021-02-26 PROCEDURE — 83540 ASSAY OF IRON: CPT

## 2021-02-26 PROCEDURE — 36415 COLL VENOUS BLD VENIPUNCTURE: CPT

## 2021-02-26 PROCEDURE — 80156 ASSAY CARBAMAZEPINE TOTAL: CPT

## 2021-02-26 PROCEDURE — 85025 COMPLETE CBC W/AUTO DIFF WBC: CPT

## 2021-02-26 PROCEDURE — 80053 COMPREHEN METABOLIC PANEL: CPT

## 2021-02-26 PROCEDURE — 80164 ASSAY DIPROPYLACETIC ACD TOT: CPT

## 2021-02-26 PROCEDURE — 83550 IRON BINDING TEST: CPT

## 2021-02-26 PROCEDURE — 82306 VITAMIN D 25 HYDROXY: CPT

## 2021-02-26 PROCEDURE — 82607 VITAMIN B-12: CPT

## 2021-03-02 ENCOUNTER — TELEPHONE (OUTPATIENT)
Dept: NEUROLOGY | Facility: MEDICAL CENTER | Age: 41
End: 2021-03-02

## 2021-03-02 NOTE — TELEPHONE ENCOUNTER
Please let Lyssa know that her Vitamin D level is quite low.  Recommend that she take a Vitamin D supplement of 5000IU daily.

## 2021-05-06 ENCOUNTER — APPOINTMENT (OUTPATIENT)
Dept: RADIOLOGY | Facility: MEDICAL CENTER | Age: 41
End: 2021-05-06
Attending: STUDENT IN AN ORGANIZED HEALTH CARE EDUCATION/TRAINING PROGRAM
Payer: MEDICARE

## 2021-08-09 DIAGNOSIS — G40.909 SEIZURE DISORDER (HCC): ICD-10-CM

## 2021-08-10 RX ORDER — DIVALPROEX SODIUM 250 MG
TABLET, EXTENDED RELEASE 24 HR ORAL
Qty: 450 TABLET | Refills: 1 | Status: SHIPPED | OUTPATIENT
Start: 2021-08-10 | End: 2021-08-23 | Stop reason: SDUPTHER

## 2021-08-10 NOTE — TELEPHONE ENCOUNTER
Received request via: Pharmacy    Was the patient seen in the last year in this department? Yes    Does the patient have an active prescription (recently filled or refills available) for medication(s) requested? No     Last seen by Dee Dee Roberson 02/2021   Next appt with Dr. Jackson 08/23/2021

## 2021-08-15 DIAGNOSIS — G40.909 SEIZURE DISORDER (HCC): ICD-10-CM

## 2021-08-16 RX ORDER — CARBAMAZEPINE 400 MG/1
TABLET, EXTENDED RELEASE ORAL
Qty: 180 TABLET | Refills: 0 | Status: SHIPPED | OUTPATIENT
Start: 2021-08-16 | End: 2021-08-23 | Stop reason: SDUPTHER

## 2021-08-16 NOTE — TELEPHONE ENCOUNTER
Received request via: Pharmacy    Was the patient seen in the last year in this department? Yes    Does the patient have an active prescription (recently filled or refills available) for medication(s) requested? No     Last seen by Dee Dee Roberson 02/22/2021   Next appt with Dr. Walsh 08/23/2021

## 2021-08-23 ENCOUNTER — OFFICE VISIT (OUTPATIENT)
Dept: NEUROLOGY | Facility: MEDICAL CENTER | Age: 41
End: 2021-08-23
Attending: STUDENT IN AN ORGANIZED HEALTH CARE EDUCATION/TRAINING PROGRAM
Payer: MEDICARE

## 2021-08-23 VITALS
HEART RATE: 66 BPM | DIASTOLIC BLOOD PRESSURE: 84 MMHG | TEMPERATURE: 97 F | BODY MASS INDEX: 33.57 KG/M2 | OXYGEN SATURATION: 96 % | WEIGHT: 213.9 LBS | HEIGHT: 67 IN | SYSTOLIC BLOOD PRESSURE: 148 MMHG | RESPIRATION RATE: 16 BRPM

## 2021-08-23 DIAGNOSIS — Z00.00 HEALTHCARE MAINTENANCE: ICD-10-CM

## 2021-08-23 DIAGNOSIS — G40.909 SEIZURE DISORDER (HCC): ICD-10-CM

## 2021-08-23 DIAGNOSIS — R56.9 SEIZURES (HCC): ICD-10-CM

## 2021-08-23 DIAGNOSIS — G40.209 LOCALIZATION-RELATED FOCAL EPILEPSY WITH COMPLEX PARTIAL SEIZURES (HCC): ICD-10-CM

## 2021-08-23 DIAGNOSIS — Z12.31 ENCOUNTER FOR SCREENING MAMMOGRAM FOR BREAST CANCER: ICD-10-CM

## 2021-08-23 PROBLEM — M25.561 ARTHRALGIA OF RIGHT KNEE: Status: ACTIVE | Noted: 2020-07-01

## 2021-08-23 PROBLEM — F43.10 POST TRAUMATIC STRESS DISORDER: Status: ACTIVE | Noted: 2021-07-20

## 2021-08-23 PROBLEM — R74.8 ABNORMAL LIVER ENZYMES: Status: ACTIVE | Noted: 2020-09-29

## 2021-08-23 PROBLEM — F32.9 DEPRESSION, MAJOR: Status: ACTIVE | Noted: 2020-10-09

## 2021-08-23 PROBLEM — G56.20 ULNAR NERVE COMPRESSION: Status: ACTIVE | Noted: 2021-01-22

## 2021-08-23 PROBLEM — I10 HYPERTENSION: Status: ACTIVE | Noted: 2020-09-29

## 2021-08-23 PROCEDURE — 99211 OFF/OP EST MAY X REQ PHY/QHP: CPT | Performed by: STUDENT IN AN ORGANIZED HEALTH CARE EDUCATION/TRAINING PROGRAM

## 2021-08-23 PROCEDURE — 99213 OFFICE O/P EST LOW 20 MIN: CPT | Performed by: STUDENT IN AN ORGANIZED HEALTH CARE EDUCATION/TRAINING PROGRAM

## 2021-08-23 RX ORDER — TRAZODONE HYDROCHLORIDE 50 MG/1
50 TABLET ORAL
COMMUNITY
Start: 2021-04-20 | End: 2022-02-08 | Stop reason: SDUPTHER

## 2021-08-23 RX ORDER — DIVALPROEX SODIUM 250 MG
TABLET, EXTENDED RELEASE 24 HR ORAL
Qty: 450 TABLET | Refills: 1 | Status: SHIPPED | OUTPATIENT
Start: 2021-08-23 | End: 2022-02-02

## 2021-08-23 RX ORDER — DIVALPROEX SODIUM 250 MG
TABLET, EXTENDED RELEASE 24 HR ORAL
Qty: 450 TABLET | Refills: 1 | Status: SHIPPED | OUTPATIENT
Start: 2021-08-23 | End: 2021-08-23

## 2021-08-23 RX ORDER — CARBAMAZEPINE 400 MG/1
TABLET, EXTENDED RELEASE ORAL
Qty: 180 TABLET | Refills: 0 | Status: SHIPPED | OUTPATIENT
Start: 2021-08-23 | End: 2022-02-07

## 2021-08-23 ASSESSMENT — FIBROSIS 4 INDEX: FIB4 SCORE: 1.07

## 2021-08-23 NOTE — PROGRESS NOTES
"NEUROLOGY CLINIC - 08/23/2021   REFERRING PROVIDER:  Shiva Jackson M.D.  [address] [place] [address]    REASON FOR VISIT: Lyssa Leblanc 40 y.o. female presents today for seizures and headaches. Was a patient of Dee Dee Roberson    SUMMARY OF PROBLEMS AND/OR DIAGNOSES:  Seizures-   Hasn't had one in over a year. Semiology: Right arm twitches then lose awareness. Had a convulsion as a child.    EEG previously showed left sided dysfunction.      Headaches only once in a while. Not that bad.    Sleep is OK. Uses trazodone to sleep.      Patient's PMH, PSH, FH, SH, allergies, and medications were reviewed:   has a past medical history of Anemia, iron deficiency (7/9/2015), Asthma, Seizure (HCC) (2019), and Stroke (HCC).    has a past surgical history that includes pr lap,diagnostic abdomen (N/A, 9/2/2020) and salpingectomy (Bilateral, 9/2/2020).   family history includes Diabetes in her mother; Heart Disease in her mother.    reports that she has been smoking cigarettes. She has a 0.40 pack-year smoking history. She has never used smokeless tobacco. She reports that she does not drink alcohol and does not use drugs.       CURRENT MEDICATIONS AT THE TIME OF THIS ENCOUNTER:    Current Outpatient Medications:   •  traZODone, 50 mg, Oral, QHS, Taking  •  TEGRETOL XR, TAKE 1 TABLET BY MOUTH TWICE A DAY  •  Depakote ER, TAKE 5 TABLETS BY MOUTH AT BEDTIME (NAME BRAND MEDICALLY NECESSARY).  •  lisinopril, 10 mg, Oral, DAILY, Taking  •  hydrOXYzine HCl, 50 mg, Oral, BID, Taking     EXAM:   Ambulatory Vitals:  Encounter Vitals  Temperature: 36.1 °C (97 °F)  Temp src: Temporal  Blood Pressure: 148/84  Pulse: 66-  Respiration: 16  Pulse Oximetry: 96 %  Weight: 97 kg (213 lb 14.4 oz)  Height: 170.2 cm (5' 7\")  BMI (Calculated): 33.5   Physical Exam:  Physical Exam  Vitals reviewed.   Constitutional:       Appearance: Normal appearance.   HENT:      Nose: Nose normal.      Mouth/Throat:      Mouth: Mucous " membranes are dry.      Pharynx: Oropharynx is clear. No oropharyngeal exudate or posterior oropharyngeal erythema.   Eyes:      General: Lids are normal.         Right eye: No discharge.         Left eye: No discharge.      Extraocular Movements: Extraocular movements intact.      Pupils: Pupils are equal, round, and reactive to light.   Cardiovascular:      Rate and Rhythm: Normal rate and regular rhythm.      Pulses: Normal pulses.      Heart sounds: Normal heart sounds.   Pulmonary:      Effort: Pulmonary effort is normal. No respiratory distress.      Breath sounds: Normal breath sounds. No stridor. No wheezing or rhonchi.   Musculoskeletal:         General: No swelling, tenderness or deformity.      Cervical back: Normal range of motion and neck supple.   Skin:     Coloration: Skin is not jaundiced.      Findings: No bruising, erythema, lesion or rash.        Neurological Exam   Neurological Exam  Mental Status  Awake, alert and oriented to person, place and time. Oriented to person, place and time. Attention and concentration are normal. Fund of knowledge is appropriate for level of education.    Cranial Nerves  CN II: Right funduscopic exam: not visualized. Left funduscopic exam: not visualized.  CN III, IV, VI: Extraocular movements intact bilaterally. Normal lids and orbits bilaterally. Pupils equal round and reactive to light bilaterally.  CN V: Facial sensation is normal.  CN VII: Full and symmetric facial movement.  CN IX, X: Palate elevates symmetrically    Motor   Increased muscle tone. RUE. Strength is 5/5 in all four extremities except as noted.  Right UE weakness 4/5 proximal muscles, 3/5 distal muscle secondary to stroke as a child.    Sensory  Sensation is intact to light touch, pinprick, vibration and proprioception in all four extremities.    Coordination  Right: Finger-to-nose normal.  Left: Finger-to-nose normal.    Gait  Casual gait: Antalgic gait.       ALL DATA (I.e. labs, procedures,  imaging, reports, clinical notes, etc.) FROM [facility] AND/OR OUTSIDE SOURCES, IF AVAILABLE, PERSONALLY REVIEWED:   LABS:    MAGING-    PROCEDURE    OUTSIDE DATA    ASSESSMENT, EDUCATION, COUNSELING:  This is a 40 y.o. female patient who presents to the neurology clinic. We had an extensive discussion about the patient's symptoms, signs, and work-up to date, if any. We discussed potential and/or definitive diagnoses, work-up, and potential treatments.       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 Seizures stable on current regimen of Depakote ER 1250 mg QHS and Tegretol  mg BID. No seizure > than a year. Needs update blood work as listed below. Recommend folic acid along with vitamin D. Has ongoing anxiety and didn't tolerate Zoloft so she stopped taking. She is to follow-up with her new psychiatrist regarding this. Also, due for mamogram.    PLAN:  If applicable, the work-up such as labs, imaging, procedures, and/or other testing, referrals, and/or recommended treatment strategies are listed below.  Visit Diagnoses     ICD-10-CM   1. Encounter for screening mammogram for breast cancer  Z12.31   2. Seizure disorder (HCC)  G40.909   3. Seizures (HCC)  R56.9   4. 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 Healthcare maintenance  Z00.00      Orders Placed This Encounter   • MA-SCREENING MAMMO BILAT W/CAD   • CBC WITH DIFFERENTIAL   • Comp Metabolic Panel   • AMMONIA   • VITAMIN D 25-HYDROXY   • traZODone (DESYREL) 50 MG Tab   • DISCONTD: DEPAKOTE  MG TABLET SR 24 HR   • TEGRETOL  MG TABLET SR 12 HR   • DEPAKOTE  MG TABLET SR 24 HR      EPILEPSY EDUCATION AND COUNSELING:  [x] Education was provided to patient and/or family regarding diagnosis and prognosis. The chronic and unpredictable nature of the condition was discussed. There is increased risk for additional events, which may carry potential for significant  injuries and death.   [x] Discussed frequent seizure triggers: sleep deprivation, medication non-compliance, use of illegal drugs/alcohol, stress, and others.   [x] We reviewed in detail the current antiepileptic regimen. Potential side effects of antiepileptics were discussed at length, including but no limited to:   • hypersensitivity reactions (rash and others, some of which can be fatal),   • visual field changes (some of which may be irreversible),   • glaucoma,   • diplopia,   • kidney stones,   • osteopenia/osteoporosis/bone fractures,   • hyperthermia/anhydrosis,   • hyponatremia,   • tremors/abnormal movements,   • ataxia,   • dizziness,   • fatigue,   • increased risk for falls,   • risk for cardiac arrhythmias/syncope,   • gastrointestinal side effects(hepatitis, pancreatitis, gastritis, ulcers),   • gingival hypertrophy/bleeding,   • Drowsiness/sedation,   • anxiety/nervousness, irritability, restlessness  • Other psychiatric side effects: increased risk for suicide, increased risk for depression  • Potential for causing psychosis.   [x] Reviewed drug-drug interactions and their potential effect on seizure control   [x] Discussed potential effects of seizures, epilepsy, and medications during pregnancy, including but not limited to fetal malformations, child developmental/intellectual disability, fetal/ risk for hemorrhages, stillbirth, maternal death, premature birth, and others. The patient/family aware that pregnancy should be avoided, unless desired, in which case we recommend discussing with us at least a year prior to planned conception. To avoid undesired pregnancy while on antiepileptics, we recommend dual contraception.   [x] Folic acid 2 mg is recommended for all females in childbearing age (12-44 years of age).   [x] Recommend chronic vitamin D supplementation   [x] Recommend regular exercise (if not contraindicated).   [x] Educated on risk for SUDEP (Sudden Death in Epilepsy).  Counseling was provided on the importance of strict medication and follow up compliance. The patient/family understand the risks associated with non-adherence with the medical plan as outlined, including but not limited to an increased risk for breakthrough seizures, which may contribute to injuries, disability, status epilepticus, and even death.   [x] Counseling was also provided on potential effects of alcohol and other drugs, which may lower seizure threshold and/or affect the metabolism of antiepileptic drugs. We recommend avoidance of alcohol and illegal drugs.  [x] Discussed aspects related to safety in drivers who suffer from epilepsy. The patient is encourage to report to the Division of Motor Vehicles of any condition and/or spells related to confusion, disorientation, and/or loss of awareness and/or loss of consciousness; as these may pose a safety issue if they occur while operating a motor vehicle. The patient and/or family are ultimately responsible for exercising caution and abiding to regulations in place.   [x] Other seizure precautions were discussed at length, including  no skydiving, no climbing or exposure to unprotected heights, no unsupervised swimming, no Jacuzzi or bathing in bathtubs or deep bodies of water.   [x]Counseled about risks for operating any machinery while suffering from seizures / syncope / epilepsy and/or while taking antiepileptic drugs.   [x] Counseled that due to the complexity of his/her diagnosis, results of any testing and further recommendations will typically be discussed/made during a face to face encounter in my office.   [x] Discussed that patient and/or family further understands it is their responsibility to keep proper follow up.     Patient/family agree with plan, as outlined.       Follow-up:   • 4 months    •     [employer] ADDRESSED IF APPLICABLE:      BILLING DOCUMENTATION:     I spent a total of 25 minutes of face-to-face time in this visit. Over 50%  of the time of the visit today was spent on counseling and/or coordination of care wtih the patient and/or family, as above in assessment in plan.    Shiva Jackson MD  Epilepsy and General Neurology  Department of Neurology  Clinical  of Neurology [facility].

## 2021-08-23 NOTE — PATIENT INSTRUCTIONS
Neurology Clinic Visit     1. Recommend folic acid 1 mg daily  2. Continue with vitamin D  3. Blood work  4. Refills for antiseizure meds sent  5. Follow-up in 4 months    IMPORTANT: If imaging, procedure(s), AND/or referrals were placed for you:  Contact [facility] Scheduling if you have not heard from them in 5 day: [phone]    Outpatient [facility] Imaging Sites.  • [address]  Mon-Fri 8am-5pm   • [address] (Breast Center) Mon-Fri 7am-4pm    • [address]  Mon-Fri 8am-5pm  • [facility] 7am-6:30pm  • [address] [facility] Mon-Fri 8am-7pm  Sat 9am-6pm   • [address]  Mon-Fri 8am-7pm and Sat/Sun 9am-5pm  • [address]  Mon-Fri 8am-5pm  • [address] (PET/CT)  Mon-Fri 8:30am-4:30pm     If labs were ordered for you:  • To Schedule an appointment for lab services, please call [phone] or visit [email or web address].  • [facility] Lab Services Convenient Locations:    [place]: • [address] (Ground Floor)  • [address] (Admitting Entrance)  • [address]  • [address]  • [address] [facility], [address]  • [address] [facility] [address]  • [address]: • [address]  • [address] [facility]       [place]: • [address]  • [address]     [facility]     [address] [address]     Laboratory Hours: 6:30am - 6pm  Monday - Friday,   7am - 2pm Saturday    [facility]      [address] [facility]   [place] [address]      Laboratory Hours:  7:30am - 4pm  Monday - Friday,  9am - 3pm Saturday    [facility]     [address]    [place] [address]      Laboratory Hours:  7:00am - 5:30pm  Monday - Friday    [facility]      [address] [place] [address]       Laboratory Hours:  7:30am - 4:30pm  Monday - Friday    [facility]       [address] [place] [address]  [address]       Laboratory Hours:  8am - 5pm  Monday - Friday    [facility]       [address] [place] [address] [place] [address]       Laboratory hours:  7:30am - 4pm  Monday - Friday    GENERAL REMINDERS:  · Request refills 1 week in advance to ensure you do not run out of medications  · All diagnostic study results will be reviewed at your next visit, UNLESS there are urgent results that need to be acted on sooner.  · Please remember that it is the your responsibility to check with your Insurance for benefit coverage for visit / visits.  · 24 hours notice is required for all appointment changes or cancellation.  · Please arrive 20 min. before your appointment time  · Please note that because Dr. Jackson has high daily clinic volume, he is unable to accommodate late arrivals. If you are more than 15 minutes late for the scheduled appointment you will be asked to reschedule  · Please bring the following with you:  1) Picture ID  2) Insurance card  3) An updated list of ALL your medications and their dosages (prescribed medications, over the counter medications, and all supplements), as well as allergies with you at all times  4) A list of questions, concerns, comments that you wish to discuss with Dr. Manuel Jackson MD  General Neurologist and Epileptologist  Department of Neurology  Instructor of Clinical Neurology [facility].

## 2021-09-13 ENCOUNTER — OFFICE VISIT (OUTPATIENT)
Dept: MEDICAL GROUP | Facility: MEDICAL CENTER | Age: 41
End: 2021-09-13
Attending: PHYSICIAN ASSISTANT
Payer: MEDICARE

## 2021-09-13 VITALS
TEMPERATURE: 97.6 F | BODY MASS INDEX: 33.49 KG/M2 | HEIGHT: 67 IN | SYSTOLIC BLOOD PRESSURE: 90 MMHG | OXYGEN SATURATION: 97 % | RESPIRATION RATE: 18 BRPM | HEART RATE: 60 BPM | DIASTOLIC BLOOD PRESSURE: 52 MMHG | WEIGHT: 213.4 LBS

## 2021-09-13 DIAGNOSIS — G56.02 CARPAL TUNNEL SYNDROME, LEFT: ICD-10-CM

## 2021-09-13 DIAGNOSIS — I10 HYPERTENSION, UNSPECIFIED TYPE: ICD-10-CM

## 2021-09-13 DIAGNOSIS — M25.561 ARTHRALGIA OF RIGHT KNEE: ICD-10-CM

## 2021-09-13 PROCEDURE — 99203 OFFICE O/P NEW LOW 30 MIN: CPT | Performed by: PHYSICIAN ASSISTANT

## 2021-09-13 RX ORDER — LISINOPRIL 10 MG/1
10 TABLET ORAL DAILY
Qty: 30 TABLET | Refills: 0 | Status: SHIPPED | OUTPATIENT
Start: 2021-09-13 | End: 2021-10-05

## 2021-09-13 RX ORDER — BLOOD PRESSURE TEST KIT
1 KIT MISCELLANEOUS ONCE
Qty: 1 KIT | Refills: 0 | Status: SHIPPED
Start: 2021-09-13 | End: 2021-09-13

## 2021-09-13 SDOH — HEALTH STABILITY: PHYSICAL HEALTH: ON AVERAGE, HOW MANY MINUTES DO YOU ENGAGE IN EXERCISE AT THIS LEVEL?: 10 MIN

## 2021-09-13 SDOH — HEALTH STABILITY: PHYSICAL HEALTH

## 2021-09-13 SDOH — ECONOMIC STABILITY: TRANSPORTATION INSECURITY
IN THE PAST 12 MONTHS, HAS THE LACK OF TRANSPORTATION KEPT YOU FROM MEDICAL APPOINTMENTS OR FROM GETTING MEDICATIONS?: NO

## 2021-09-13 SDOH — ECONOMIC STABILITY: HOUSING INSECURITY
IN THE LAST 12 MONTHS, WAS THERE A TIME WHEN YOU DID NOT HAVE A STEADY PLACE TO SLEEP OR SLEPT IN A SHELTER (INCLUDING NOW)?: NO

## 2021-09-13 SDOH — ECONOMIC STABILITY: HOUSING INSECURITY
IN THE LAST 12 MONTHS, WAS THERE A TIME WHEN YOU DID NOT HAVE A STEADY PLACE TO SLEEP OR SLEPT IN A SHELTER (INCLUDING NOW)?: PATIENT REFUSED

## 2021-09-13 SDOH — ECONOMIC STABILITY: HOUSING INSECURITY: IN THE LAST 12 MONTHS, HOW MANY PLACES HAVE YOU LIVED?: 0

## 2021-09-13 SDOH — ECONOMIC STABILITY: FOOD INSECURITY: WITHIN THE PAST 12 MONTHS, YOU WORRIED THAT YOUR FOOD WOULD RUN OUT BEFORE YOU GOT MONEY TO BUY MORE.: PATIENT DECLINED

## 2021-09-13 SDOH — ECONOMIC STABILITY: INCOME INSECURITY: IN THE LAST 12 MONTHS, WAS THERE A TIME WHEN YOU WERE NOT ABLE TO PAY THE MORTGAGE OR RENT ON TIME?: PATIENT REFUSED

## 2021-09-13 SDOH — ECONOMIC STABILITY: TRANSPORTATION INSECURITY
IN THE PAST 12 MONTHS, HAS LACK OF RELIABLE TRANSPORTATION KEPT YOU FROM MEDICAL APPOINTMENTS, MEETINGS, WORK OR FROM GETTING THINGS NEEDED FOR DAILY LIVING?: NO

## 2021-09-13 SDOH — ECONOMIC STABILITY: INCOME INSECURITY: HOW HARD IS IT FOR YOU TO PAY FOR THE VERY BASICS LIKE FOOD, HOUSING, MEDICAL CARE, AND HEATING?: PATIENT DECLINED

## 2021-09-13 SDOH — ECONOMIC STABILITY: FOOD INSECURITY: WITHIN THE PAST 12 MONTHS, THE FOOD YOU BOUGHT JUST DIDN'T LAST AND YOU DIDN'T HAVE MONEY TO GET MORE.: PATIENT DECLINED

## 2021-09-13 SDOH — HEALTH STABILITY: MENTAL HEALTH
STRESS IS WHEN SOMEONE FEELS TENSE, NERVOUS, ANXIOUS, OR CAN'T SLEEP AT NIGHT BECAUSE THEIR MIND IS TROUBLED. HOW STRESSED ARE YOU?: PATIENT DECLINED

## 2021-09-13 SDOH — ECONOMIC STABILITY: TRANSPORTATION INSECURITY
IN THE PAST 12 MONTHS, HAS LACK OF TRANSPORTATION KEPT YOU FROM MEETINGS, WORK, OR FROM GETTING THINGS NEEDED FOR DAILY LIVING?: NO

## 2021-09-13 ASSESSMENT — LIFESTYLE VARIABLES
HOW MANY STANDARD DRINKS CONTAINING ALCOHOL DO YOU HAVE ON A TYPICAL DAY: PATIENT DECLINED
HOW OFTEN DO YOU HAVE SIX OR MORE DRINKS ON ONE OCCASION: PATIENT DECLINED
HOW OFTEN DO YOU HAVE A DRINK CONTAINING ALCOHOL: NEVER

## 2021-09-13 ASSESSMENT — SOCIAL DETERMINANTS OF HEALTH (SDOH)
HOW OFTEN DO YOU GET TOGETHER WITH FRIENDS OR RELATIVES?: PATIENT DECLINED
HOW OFTEN DO YOU HAVE A DRINK CONTAINING ALCOHOL: NEVER
HOW OFTEN DO YOU ATTEND CHURCH OR RELIGIOUS SERVICES?: PATIENT DECLINED
HOW OFTEN DO YOU ATTENT MEETINGS OF THE CLUB OR ORGANIZATION YOU BELONG TO?: PATIENT DECLINED
HOW HARD IS IT FOR YOU TO PAY FOR THE VERY BASICS LIKE FOOD, HOUSING, MEDICAL CARE, AND HEATING?: PATIENT DECLINED
IN A TYPICAL WEEK, HOW MANY TIMES DO YOU TALK ON THE PHONE WITH FAMILY, FRIENDS, OR NEIGHBORS?: PATIENT DECLINED
HOW OFTEN DO YOU ATTEND CHURCH OR RELIGIOUS SERVICES?: PATIENT DECLINED
WITHIN THE PAST 12 MONTHS, YOU WORRIED THAT YOUR FOOD WOULD RUN OUT BEFORE YOU GOT THE MONEY TO BUY MORE: PATIENT DECLINED
IN A TYPICAL WEEK, HOW MANY TIMES DO YOU TALK ON THE PHONE WITH FAMILY, FRIENDS, OR NEIGHBORS?: PATIENT DECLINED
HOW OFTEN DO YOU HAVE SIX OR MORE DRINKS ON ONE OCCASION: PATIENT DECLINED
DO YOU BELONG TO ANY CLUBS OR ORGANIZATIONS SUCH AS CHURCH GROUPS UNIONS, FRATERNAL OR ATHLETIC GROUPS, OR SCHOOL GROUPS?: NO
HOW OFTEN DO YOU ATTENT MEETINGS OF THE CLUB OR ORGANIZATION YOU BELONG TO?: PATIENT DECLINED
HOW MANY DRINKS CONTAINING ALCOHOL DO YOU HAVE ON A TYPICAL DAY WHEN YOU ARE DRINKING: PATIENT DECLINED
HOW OFTEN DO YOU GET TOGETHER WITH FRIENDS OR RELATIVES?: PATIENT DECLINED
DO YOU BELONG TO ANY CLUBS OR ORGANIZATIONS SUCH AS CHURCH GROUPS UNIONS, FRATERNAL OR ATHLETIC GROUPS, OR SCHOOL GROUPS?: NO

## 2021-09-13 ASSESSMENT — FIBROSIS 4 INDEX: FIB4 SCORE: 1.07

## 2021-09-13 NOTE — ASSESSMENT & PLAN NOTE
"Onset/SABINA: Several years.   Location: left 3rd and 4th digits.   Duration: \"Constant.\"   Character: \"Burning, tingling/numbness pain that hurts.\"  Alleviating factors: None.   Aggravating factors: writing or typing.  Nighttime is when the pain is the worst.   Radiation: left wrist.  Treatments tried: Bracing and corticosteroid injections -- No benefit with these therapies.   No red flags.   "

## 2021-09-13 NOTE — ASSESSMENT & PLAN NOTE
"Onset/SABINA: A couple of months.   Location: Right lateral knee.   Duration: Intermittent.   Character: \"Achy and throbbing.\"   Alleviating factors: None.   Aggravating factors: Walking long distances.   Radiation: None.   Treatments tried: CS injections -- no benefit.   Xrays were completed but she does not remember the results.   No red flag.    "

## 2021-09-13 NOTE — LETTER
[facility]  Ileana Singh P.A.-C.  [address]   [place] [address]  Fax: [phone]   Authorization for Release/Disclosure of   Protected Health Information   Name: MICHELLE NICHOLAS : 1980 SSN: [id number]   Address: [address] [place], [place] [address] [address] Phone:    [phone] (home) [phone] (work)   I authorize the entity listed below to release/disclose the PHI below to:   [facility]/Ileana Singh P.A.-C. and Ileana Singh P.A.-C.   Provider or Entity Name:    Dr. Radha Daly    [facility] [place]    [address] [id number] Phone: [phone]      Fax:     Reason for request: continuity of care. Please release all information including imaging and labs.    Information to be released:    [  ] LAST COLONOSCOPY,  including any PATH REPORT and follow-up  [  ] LAST FIT/COLOGUARD RESULT [  ] LAST DEXA  [  ] LAST MAMMOGRAM  [  ] LAST PAP  [  ] LAST LABS [  ] RETINA EXAM REPORT  [  ] IMMUNIZATION RECORDS  [XXXX] Release all info      [  ] Check here and initial the line next to each item to release ALL health information INCLUDING  _____ Care and treatment for drug and / or alcohol abuse  _____ HIV testing, infection status, or AIDS  _____ Genetic Testing    DATES OF SERVICE OR TIME PERIOD TO BE DISCLOSED: _____________  I understand and acknowledge that:  * This Authorization may be revoked at any time by you in writing, except if your health information has already been used or disclosed.  * Your health information that will be used or disclosed as a result of you signing this authorization could be re-disclosed by the recipient. If this occurs, your re-disclosed health information may no longer be protected by State or Federal laws.  * You may refuse to sign this Authorization. Your refusal will not affect your ability to obtain treatment.  * This Authorization becomes effective upon signing and will  on (date) __________.      If no date is indicated, this  Authorization will  one (1) year from the signature date.    Name: Lyssa Leblanc    Signature:   Date:     2021       PLEASE FAX REQUESTED RECORDS BACK TO: [phone]

## 2021-09-13 NOTE — ASSESSMENT & PLAN NOTE
This is a chronic condition.  Onset: About 1 year.   Risk Factors: FMHx, obesity, Etoh use, excess dietary sodium, stress, physical inactivity, tobacco use, insulin resistance  Current Meds: Lisinopril 10 mg.   Side effects: None.   Home BP Log: None.   Associated symptoms: chest pain, SOB, HA's, dizziness or lightheadedness.

## 2021-09-13 NOTE — PROGRESS NOTES
"Chief Complaint   Patient presents with   • Arthritis   • Other     Blood pressure      Subjective:     HPI:   Lyssa Leblanc is a 40 y.o. female here to establish care and to discuss the evaluation and management of:    Hypertension  This is a chronic condition.  Onset: About 1 year.   Risk Factors: FMHx, obesity, Etoh use, excess dietary sodium, stress, physical inactivity, tobacco use, insulin resistance  Current Meds: Lisinopril 10 mg.   Side effects: None.   Home BP Log: None.   Associated symptoms: chest pain, SOB, HA's, dizziness or lightheadedness.    Carpal tunnel syndrome, left  Onset/SABINA: Several years.   Location: left 3rd and 4th digits.   Duration: \"Constant.\"   Character: \"Burning, tingling/numbness pain that hurts.\"  Alleviating factors: None.   Aggravating factors: writing or typing.  Nighttime is when the pain is the worst.   Radiation: left wrist.  Treatments tried: Bracing and corticosteroid injections -- No benefit with these therapies.   No red flags.     Arthralgia of right knee  Onset/SABINA: A couple of months.   Location: Right lateral knee.   Duration: Intermittent.   Character: \"Achy and throbbing.\"   Alleviating factors: None.   Aggravating factors: Walking long distances.   Radiation: None.   Treatments tried: CS injections -- no benefit.   Xrays were completed but she does not remember the results.   No red flag.    ROS  See HPI.     No Known Allergies    Current medicines (including changes today)  Current Outpatient Medications   Medication Sig Dispense Refill   • Blood Pressure Kit 1 Each one time for 1 dose. 1 Kit 0   • lisinopril (PRINIVIL) 10 MG Tab Take 1 Tablet by mouth every day. Takes in am 30 Tablet 0   • traZODone (DESYREL) 50 MG Tab Take 50 mg by mouth at bedtime.     • TEGRETOL  MG TABLET SR 12 HR TAKE 1 TABLET BY MOUTH TWICE A  Tablet 0   • DEPAKOTE  MG TABLET SR 24 HR TAKE 5 TABLETS BY MOUTH AT BEDTIME (NAME BRAND MEDICALLY NECESSARY). 450 Tablet " 1   • hydrOXYzine HCl (ATARAX) 25 MG Tab Take 50 mg by mouth 2 Times a Day.       No current facility-administered medications for this visit.     She  has a past medical history of Anemia, iron deficiency (2015), Asthma, Seizure ([facility]) (), and Stroke ([facility]).  She  has a past surgical history that includes pr lap,diagnostic abdomen (N/A, 2020); tubal coagulation laparoscopic bilateral; and salpingectomy (Bilateral, 2020).  Social History     Tobacco Use   • Smoking status: Current Every Day Smoker     Packs/day: 0.20     Years: 2.00     Pack years: 0.40     Types: Cigarettes   • Smokeless tobacco: Never Used   Vaping Use   • Vaping Use: Never used   Substance Use Topics   • Alcohol use: No   • Drug use: No     Family History   Problem Relation Age of Onset   • Heart Disease Mother    • Diabetes Mother    • Hypertension Father    • Diabetes Maternal Grandmother    • Cancer Neg Hx    • Hyperlipidemia Neg Hx      Family Status   Relation Name Status   • Mo     • Fa  Alive     Patient Active Problem List    Diagnosis Date Noted   • Post traumatic stress disorder 2021   • Ulnar nerve compression 2021   • Depression, major 10/09/2020   • Hypertension 2020   • Abnormal liver enzymes 2020   • Asthma 2020   • Arthralgia of right knee 2020   • Osteopenia 2020   • Carpal tunnel syndrome, left 2019   • Anxiety 2019   • Insomnia 2019   • Mood disorder ([facility]) 2019   • Learning disability 2019   • Right spastic hemiplegia ([facility]) 2019   • Foot drop, right 2018   • Seizure disorder ([facility]) 2018   • Hyperlipidemia 2018   • Family history of cardiovascular disease 2018   • History of stroke 2018   • Family tension 2016   • Other specified postprocedural states 10/08/2015   • Anemia, iron deficiency 2015   • Family history of diabetes mellitus 2015   • Obesity 2015   • Scoliosis  "11/25/2014      Objective:     BP (!) 90/52 (BP Location: Left arm, Patient Position: Sitting, BP Cuff Size: Adult)   Pulse 60   Temp 36.4 °C (97.6 °F) (Temporal)   Resp 18   Ht 1.702 m (5' 7\")   Wt 96.8 kg (213 lb 6.4 oz)   SpO2 97%  Body mass index is 33.42 kg/m².    Physical Exam:  Physical Exam  Vitals reviewed.   Constitutional:       Appearance: Normal appearance.   HENT:      Head: Normocephalic.      Right Ear: External ear normal.      Left Ear: External ear normal.   Cardiovascular:      Rate and Rhythm: Normal rate and regular rhythm.      Heart sounds: Normal heart sounds.   Pulmonary:      Effort: Pulmonary effort is normal.      Breath sounds: Normal breath sounds.   Musculoskeletal:         General: Normal range of motion.      Cervical back: Normal range of motion.      Right knee: Bony tenderness present. Tenderness present over the lateral joint line and LCL.   Skin:     General: Skin is warm and dry.   Neurological:      General: No focal deficit present.      Mental Status: She is alert and oriented to person, place, and time.      Gait: Gait is intact.      Comments: Positive Tinel's and Phalen's.       Assessment and Plan:     The following treatment plan was discussed:    1. Hypertension, unspecified type  - This is a chronic condition.  - Plan: Decrease Lisinopril to 10 mg daily and monitor blood pressures regularly. Instructed to keep a log of her BP readings and send them over to me on [employer] for review in 1 week.   - Blood Pressure Kit; 1 Each one time for 1 dose.  Dispense: 1 Kit; Refill: 0  - lisinopril (PRINIVIL) 10 MG Tab; Take 1 Tablet by mouth every day. Takes in am  Dispense: 30 Tablet; Refill: 0    2. Carpal tunnel syndrome, left  - REFERRAL TO ORTHOPEDICS    3. Arthralgia of right knee  - REFERRAL TO ORTHOPEDICS       Any change or worsening of signs or symptoms, patient encouraged to follow-up or report to emergency room for further evaluation. Patient verbalizes " understanding and agrees.    Follow-Up: Return if symptoms worsen or fail to improve.      PLEASE NOTE: This dictation was created using voice recognition software. I have made every reasonable attempt to correct obvious errors, but I expect that there are errors of grammar and possibly content that I did not discover before finalizing the note.

## 2021-09-17 ENCOUNTER — HOSPITAL ENCOUNTER (OUTPATIENT)
Dept: LAB | Facility: MEDICAL CENTER | Age: 41
End: 2021-09-17
Attending: STUDENT IN AN ORGANIZED HEALTH CARE EDUCATION/TRAINING PROGRAM
Payer: MEDICARE

## 2021-09-17 LAB
25(OH)D3 SERPL-MCNC: 58 NG/ML (ref 30–100)
ALBUMIN SERPL BCP-MCNC: 4.3 G/DL (ref 3.2–4.9)
ALBUMIN/GLOB SERPL: 1.5 G/DL
ALP SERPL-CCNC: 62 U/L (ref 30–99)
ALT SERPL-CCNC: 26 U/L (ref 2–50)
AMMONIA PLAS-SCNC: 47 UMOL/L (ref 11–45)
ANION GAP SERPL CALC-SCNC: 16 MMOL/L (ref 7–16)
AST SERPL-CCNC: 27 U/L (ref 12–45)
BASOPHILS # BLD AUTO: 0.5 % (ref 0–1.8)
BASOPHILS # BLD: 0.02 K/UL (ref 0–0.12)
BILIRUB SERPL-MCNC: 0.2 MG/DL (ref 0.1–1.5)
BUN SERPL-MCNC: 12 MG/DL (ref 8–22)
CALCIUM SERPL-MCNC: 9.3 MG/DL (ref 8.5–10.5)
CHLORIDE SERPL-SCNC: 101 MMOL/L (ref 96–112)
CO2 SERPL-SCNC: 23 MMOL/L (ref 20–33)
CREAT SERPL-MCNC: 0.81 MG/DL (ref 0.5–1.4)
EOSINOPHIL # BLD AUTO: 0.11 K/UL (ref 0–0.51)
EOSINOPHIL NFR BLD: 2.7 % (ref 0–6.9)
ERYTHROCYTE [DISTWIDTH] IN BLOOD BY AUTOMATED COUNT: 42.9 FL (ref 35.9–50)
GLOBULIN SER CALC-MCNC: 2.9 G/DL (ref 1.9–3.5)
GLUCOSE SERPL-MCNC: 86 MG/DL (ref 65–99)
HCT VFR BLD AUTO: 34.7 % (ref 37–47)
HGB BLD-MCNC: 11.4 G/DL (ref 12–16)
IMM GRANULOCYTES # BLD AUTO: 0.01 K/UL (ref 0–0.11)
IMM GRANULOCYTES NFR BLD AUTO: 0.2 % (ref 0–0.9)
LYMPHOCYTES # BLD AUTO: 1.33 K/UL (ref 1–4.8)
LYMPHOCYTES NFR BLD: 32.4 % (ref 22–41)
MCH RBC QN AUTO: 29.8 PG (ref 27–33)
MCHC RBC AUTO-ENTMCNC: 32.9 G/DL (ref 33.6–35)
MCV RBC AUTO: 90.8 FL (ref 81.4–97.8)
MONOCYTES # BLD AUTO: 0.37 K/UL (ref 0–0.85)
MONOCYTES NFR BLD AUTO: 9 % (ref 0–13.4)
NEUTROPHILS # BLD AUTO: 2.26 K/UL (ref 2–7.15)
NEUTROPHILS NFR BLD: 55.2 % (ref 44–72)
NRBC # BLD AUTO: 0 K/UL
NRBC BLD-RTO: 0 /100 WBC
PLATELET # BLD AUTO: 140 K/UL (ref 164–446)
PMV BLD AUTO: 14.1 FL (ref 9–12.9)
POTASSIUM SERPL-SCNC: 3.4 MMOL/L (ref 3.6–5.5)
PROT SERPL-MCNC: 7.2 G/DL (ref 6–8.2)
RBC # BLD AUTO: 3.82 M/UL (ref 4.2–5.4)
SODIUM SERPL-SCNC: 140 MMOL/L (ref 135–145)
WBC # BLD AUTO: 4.1 K/UL (ref 4.8–10.8)

## 2021-09-17 PROCEDURE — 82140 ASSAY OF AMMONIA: CPT

## 2021-09-17 PROCEDURE — 85025 COMPLETE CBC W/AUTO DIFF WBC: CPT

## 2021-09-17 PROCEDURE — 80053 COMPREHEN METABOLIC PANEL: CPT

## 2021-09-17 PROCEDURE — 36415 COLL VENOUS BLD VENIPUNCTURE: CPT

## 2021-09-17 PROCEDURE — 82306 VITAMIN D 25 HYDROXY: CPT | Mod: GA

## 2021-10-05 DIAGNOSIS — I10 HYPERTENSION, UNSPECIFIED TYPE: ICD-10-CM

## 2021-10-05 RX ORDER — LISINOPRIL 10 MG/1
TABLET ORAL
Qty: 30 TABLET | Refills: 0 | Status: SHIPPED | OUTPATIENT
Start: 2021-10-05 | End: 2021-11-14

## 2021-10-20 ENCOUNTER — OFFICE VISIT (OUTPATIENT)
Dept: MEDICAL GROUP | Facility: MEDICAL CENTER | Age: 41
End: 2021-10-20
Attending: PHYSICIAN ASSISTANT
Payer: MEDICARE

## 2021-10-20 VITALS
HEART RATE: 73 BPM | HEIGHT: 67 IN | WEIGHT: 206.7 LBS | RESPIRATION RATE: 18 BRPM | OXYGEN SATURATION: 99 % | BODY MASS INDEX: 32.44 KG/M2 | DIASTOLIC BLOOD PRESSURE: 70 MMHG | SYSTOLIC BLOOD PRESSURE: 118 MMHG | TEMPERATURE: 96.5 F

## 2021-10-20 DIAGNOSIS — Z23 NEED FOR VACCINATION: ICD-10-CM

## 2021-10-20 DIAGNOSIS — M25.522 LEFT ELBOW PAIN: ICD-10-CM

## 2021-10-20 PROCEDURE — 99213 OFFICE O/P EST LOW 20 MIN: CPT | Performed by: PHYSICIAN ASSISTANT

## 2021-10-20 PROCEDURE — 99213 OFFICE O/P EST LOW 20 MIN: CPT | Mod: 25 | Performed by: PHYSICIAN ASSISTANT

## 2021-10-20 PROCEDURE — 90686 IIV4 VACC NO PRSV 0.5 ML IM: CPT

## 2021-10-20 RX ORDER — MELOXICAM 7.5 MG/1
7.5 TABLET ORAL DAILY
Qty: 30 TABLET | Refills: 0 | Status: SHIPPED | OUTPATIENT
Start: 2021-10-20 | End: 2021-11-16

## 2021-10-20 ASSESSMENT — FIBROSIS 4 INDEX: FIB4 SCORE: 1.55

## 2021-10-20 NOTE — PROGRESS NOTES
"Chief Complaint   Patient presents with   • Elbow Pain     Subjective:     HPI:   Lyssa Leblanc is a 41 y.o. female here to discuss the evaluation and management of:    Left elbow pain  Onset/SABINA: 2 weeks ago.  Location: left elbow.   Duration: Constant.   Character: \"Very painful.\"  Alleviating factors: None.   Aggravating factors: Nothing in particular.   Radiation: Down the arm into the hand.   Treatments tried: Tylenol without benefit.   No red flag signs.     ROS  See HPI.     No Known Allergies    Current medicines (including changes today)  Current Outpatient Medications   Medication Sig Dispense Refill   • meloxicam (MOBIC) 7.5 MG Tab Take 1 Tablet by mouth every day. 30 Tablet 0   • lisinopril (PRINIVIL) 10 MG Tab TAKE 1 TABLET BY MOUTH EVERY DAY IN THE MORNING 30 Tablet 0   • traZODone (DESYREL) 50 MG Tab Take 50 mg by mouth at bedtime.     • hydrOXYzine HCl (ATARAX) 25 MG Tab Take 50 mg by mouth 2 Times a Day.     • TEGRETOL  MG TABLET SR 12 HR TAKE 1 TABLET BY MOUTH TWICE A  Tablet 0   • DEPAKOTE  MG TABLET SR 24 HR TAKE 5 TABLETS BY MOUTH AT BEDTIME (NAME BRAND MEDICALLY NECESSARY). 450 Tablet 1     No current facility-administered medications for this visit.       Social History     Tobacco Use   • Smoking status: Current Every Day Smoker     Packs/day: 0.20     Years: 2.00     Pack years: 0.40     Types: Cigarettes   • Smokeless tobacco: Never Used   Vaping Use   • Vaping Use: Never used   Substance Use Topics   • Alcohol use: No   • Drug use: No       Patient Active Problem List    Diagnosis Date Noted   • Left elbow pain 10/20/2021   • Post traumatic stress disorder 07/20/2021   • Ulnar nerve compression 01/22/2021   • Depression, major 10/09/2020   • Hypertension 09/29/2020   • Abnormal liver enzymes 09/29/2020   • Asthma 09/02/2020   • Arthralgia of right knee 07/01/2020   • Osteopenia 01/22/2020   • Carpal tunnel syndrome, left 12/27/2019   • Anxiety 09/13/2019   • " "Insomnia 09/13/2019   • Mood disorder (HCC) 09/13/2019   • Learning disability 06/26/2019   • Right spastic hemiplegia (HCC) 06/26/2019   • Foot drop, right 05/30/2018   • Seizure disorder (HCC) 05/23/2018   • Hyperlipidemia 04/24/2018   • Family history of cardiovascular disease 04/11/2018   • History of stroke 04/11/2018   • Family tension 11/29/2016   • Other specified postprocedural states 10/08/2015   • Anemia, iron deficiency 07/09/2015   • Family history of diabetes mellitus 07/09/2015   • Obesity 06/05/2015   • Scoliosis 11/25/2014     Family History   Problem Relation Age of Onset   • Heart Disease Mother    • Diabetes Mother    • Hypertension Father    • Diabetes Maternal Grandmother    • Cancer Neg Hx    • Hyperlipidemia Neg Hx       Objective:     /70 (BP Location: Left arm, Patient Position: Sitting, BP Cuff Size: Adult)   Pulse 73   Temp 35.8 °C (96.5 °F) (Skin)   Resp 18   Ht 1.702 m (5' 7\")   Wt 93.8 kg (206 lb 11.2 oz)   SpO2 99%  Body mass index is 32.37 kg/m².    Physical Exam:  Physical Exam  Vitals reviewed.   Constitutional:       Appearance: Normal appearance.   HENT:      Head: Normocephalic.      Right Ear: External ear normal.      Left Ear: External ear normal.   Cardiovascular:      Rate and Rhythm: Normal rate and regular rhythm.      Heart sounds: Normal heart sounds.   Pulmonary:      Effort: Pulmonary effort is normal.      Breath sounds: Normal breath sounds.   Musculoskeletal:      Left elbow: Swelling present. Tenderness present in medial epicondyle and lateral epicondyle.   Neurological:      Mental Status: She is alert.       Assessment and Plan:     The following treatment plan was discussed:    1. Left elbow pain  - This is an acute condition.  - Plan: Obtain plain films of the elbow for further evaluation.  Ace bandage applied to the elbow today in clinic.  Start on a combination of meloxicam and Tylenol as needed.  Ice prn.  Continue range of motion as tolerated " to avoid stiffness.  - meloxicam (MOBIC) 7.5 MG Tab; Take 1 Tablet by mouth every day.  Dispense: 30 Tablet; Refill: 0  - DX-ELBOW-COMPLETE 3+ LEFT; Future    2. Need for vaccination  - INFLUENZA VACCINE QUAD INJ (PF)     Any change or worsening of signs or symptoms, patient encouraged to follow-up or report to emergency room for further evaluation. Patient verbalizes understanding and agrees.    Follow-Up: Return if symptoms worsen or fail to improve.      PLEASE NOTE: This dictation was created using voice recognition software. I have made every reasonable attempt to correct obvious errors, but I expect that there are errors of grammar and possibly content that I did not discover before finalizing the note.

## 2021-10-20 NOTE — ASSESSMENT & PLAN NOTE
"Onset/SABINA: 2 weeks ago.  Location: left elbow.   Duration: Constant.   Character: \"Very painful.\"  Alleviating factors: None.   Aggravating factors: Nothing in particular.   Radiation: Down the arm into the hand.   Treatments tried: Tylenol without benefit.   No red flag signs.   "

## 2021-11-10 ENCOUNTER — HOSPITAL ENCOUNTER (OUTPATIENT)
Dept: RADIOLOGY | Facility: MEDICAL CENTER | Age: 41
End: 2021-11-10
Attending: PHYSICIAN ASSISTANT
Payer: MEDICARE

## 2021-11-10 DIAGNOSIS — M25.522 LEFT ELBOW PAIN: ICD-10-CM

## 2021-11-10 PROCEDURE — 73080 X-RAY EXAM OF ELBOW: CPT | Mod: LT

## 2021-11-11 DIAGNOSIS — I10 HYPERTENSION, UNSPECIFIED TYPE: ICD-10-CM

## 2021-11-14 RX ORDER — LISINOPRIL 10 MG/1
TABLET ORAL
Qty: 30 TABLET | Refills: 0 | Status: SHIPPED | OUTPATIENT
Start: 2021-11-14 | End: 2021-12-10

## 2021-11-16 DIAGNOSIS — M25.522 LEFT ELBOW PAIN: ICD-10-CM

## 2021-11-16 RX ORDER — MELOXICAM 7.5 MG/1
7.5 TABLET ORAL DAILY
Qty: 30 TABLET | Refills: 0 | Status: SHIPPED | OUTPATIENT
Start: 2021-11-16 | End: 2021-12-15

## 2021-12-10 DIAGNOSIS — I10 HYPERTENSION, UNSPECIFIED TYPE: ICD-10-CM

## 2021-12-10 RX ORDER — LISINOPRIL 10 MG/1
TABLET ORAL
Qty: 30 TABLET | Refills: 0 | Status: SHIPPED | OUTPATIENT
Start: 2021-12-10 | End: 2022-01-10

## 2021-12-13 ENCOUNTER — OFFICE VISIT (OUTPATIENT)
Dept: NEUROLOGY | Facility: MEDICAL CENTER | Age: 41
End: 2021-12-13
Attending: STUDENT IN AN ORGANIZED HEALTH CARE EDUCATION/TRAINING PROGRAM
Payer: MEDICARE

## 2021-12-13 VITALS
SYSTOLIC BLOOD PRESSURE: 152 MMHG | HEART RATE: 64 BPM | BODY MASS INDEX: 30.48 KG/M2 | WEIGHT: 194.22 LBS | OXYGEN SATURATION: 99 % | DIASTOLIC BLOOD PRESSURE: 94 MMHG | RESPIRATION RATE: 12 BRPM | TEMPERATURE: 97.7 F | HEIGHT: 67 IN

## 2021-12-13 DIAGNOSIS — R79.89 LOW VITAMIN D LEVEL: ICD-10-CM

## 2021-12-13 DIAGNOSIS — G40.909 SEIZURE DISORDER (HCC): ICD-10-CM

## 2021-12-13 DIAGNOSIS — Z86.73 HISTORY OF STROKE: ICD-10-CM

## 2021-12-13 DIAGNOSIS — G40.209 LOCALIZATION-RELATED FOCAL EPILEPSY WITH COMPLEX PARTIAL SEIZURES (HCC): ICD-10-CM

## 2021-12-13 DIAGNOSIS — G81.11 RIGHT SPASTIC HEMIPLEGIA (HCC): ICD-10-CM

## 2021-12-13 DIAGNOSIS — D64.9 ANEMIA, UNSPECIFIED TYPE: ICD-10-CM

## 2021-12-13 DIAGNOSIS — D69.1 THROMBOCYTOPATHIA (HCC): ICD-10-CM

## 2021-12-13 DIAGNOSIS — F39 MOOD DISORDER (HCC): ICD-10-CM

## 2021-12-13 PROCEDURE — 99212 OFFICE O/P EST SF 10 MIN: CPT | Performed by: STUDENT IN AN ORGANIZED HEALTH CARE EDUCATION/TRAINING PROGRAM

## 2021-12-13 PROCEDURE — 99213 OFFICE O/P EST LOW 20 MIN: CPT | Performed by: STUDENT IN AN ORGANIZED HEALTH CARE EDUCATION/TRAINING PROGRAM

## 2021-12-13 ASSESSMENT — FIBROSIS 4 INDEX: FIB4 SCORE: 1.55

## 2021-12-13 NOTE — PROGRESS NOTES
"NEUROLOGY CLINIC FOLLOW-UP - 12/13/2021   REFERRING PROVIDER  No referring provider defined for this encounter.    REASON FOR VISIT: Lyssa Leblanc 41 y.o. female presents today for follow-up for seizures         INTERVAL HISTORY:  Depakote XR 1250 mg , tegretol  mg BID. No seizure for almost 2 years. No side effects, Sleep is good. Sleep helped with trazodone.    Mild headaches occasionally, not severe.    Taking folic acid and vit D.    Mod is good.    Patient's PMH, PSH, FH, and SH were reviewed.    ROS: All review of systems complete and are negative except as documented  CURRENT MEDICATIONS AT THE TIME OF THIS ENCOUNTER:    Current Outpatient Medications:   •  lisinopril, TAKE 1 TABLET BY MOUTH EVERY DAY IN THE MORNING, Taking  •  meloxicam, 7.5 mg, Oral, DAILY, Taking  •  traZODone, 50 mg, Oral, QHS, Taking  •  TEGRETOL XR, TAKE 1 TABLET BY MOUTH TWICE A DAY, Taking  •  Depakote ER, TAKE 5 TABLETS BY MOUTH AT BEDTIME (NAME BRAND MEDICALLY NECESSARY)., Taking  •  hydrOXYzine HCl, 50 mg, Oral, BID, Taking     EXAM:   Ambulatory Vitals:  /94 (BP Location: Left arm, Patient Position: Sitting, BP Cuff Size: Adult)   Pulse 64   Temp 36.5 °C (97.7 °F) (Temporal)   Resp 12   Ht 1.702 m (5' 7\")   Wt 88.1 kg (194 lb 3.6 oz)   SpO2 99%    Physical Exam:  Physical Exam  Vitals reviewed.   Constitutional:       Appearance: Normal appearance.   HENT:      Nose: Nose normal.      Mouth/Throat:      Mouth: Mucous membranes are dry.      Pharynx: Oropharynx is clear. No oropharyngeal exudate or posterior oropharyngeal erythema.   Eyes:      General: Lids are normal.         Right eye: No discharge.         Left eye: No discharge.      Extraocular Movements: Extraocular movements intact.      Pupils: Pupils are equal, round, and reactive to light.   Cardiovascular:      Rate and Rhythm: Normal rate and regular rhythm.      Pulses: Normal pulses.      Heart sounds: Normal heart sounds.   Pulmonary:      " Effort: Pulmonary effort is normal. No respiratory distress.      Breath sounds: Normal breath sounds. No stridor. No wheezing or rhonchi.   Musculoskeletal:         General: No swelling, tenderness or deformity.      Cervical back: Normal range of motion and neck supple.   Skin:     Coloration: Skin is not jaundiced.      Findings: No bruising, erythema, lesion or rash.        Neurological Exam   Neurological Exam  Mental Status  Awake, alert and oriented to person, place and time. Oriented to person, place and time. Attention and concentration are normal. Fund of knowledge is appropriate for level of education.    Cranial Nerves  CN II: Right funduscopic exam: not visualized. Left funduscopic exam: not visualized.  CN III, IV, VI: Extraocular movements intact bilaterally. Normal lids and orbits bilaterally. Pupils equal round and reactive to light bilaterally.  CN V: Facial sensation is normal.  CN VII: Full and symmetric facial movement.  CN IX, X: Palate elevates symmetrically    Motor   Increased muscle tone. RUE. Strength is 5/5 in all four extremities except as noted.  Right UE weakness 4/5 proximal muscles, 3/5 distal muscle secondary to stroke as a child.    Sensory  Sensation is intact to light touch, pinprick, vibration and proprioception in all four extremities.    Coordination  Right: Finger-to-nose normal.  Left: Finger-to-nose normal.    Gait  Casual gait: Antalgic gait.       ALL DATA (I.e. labs, procedures, imaging, reports, clinical notes, etc.) FROM [facility] AND/OR OUTSIDE SOURCES, IF AVAILABLE, PERSONALLY REVIEWED:   LABS:    MAGING-    PROCEDURE    OUTSIDE DATA    ASSESSMENT, EDUCATION, AND COUNSELING:  This is a 41 y.o. female patient who presents to the neurology clinic. We had an extensive discussion about the patient's symptoms, signs, and work-up to date, if any. We discussed potential and/or definitive diagnoses, work-up, and potential treatments.       PLAN:  If applicable, the work-up such  as labs, imaging, procedures, and/or other testing, referrals, and/or recommended treatment strategies are listed below.  Visit Diagnoses     ICD-10-CM   1. Seizure disorder (HCC)  G40.909   2. 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 Right spastic hemiplegia (HCC)  G81.11   4. History of stroke  Z86.73   5. Low vitamin D level  R79.89   6. Mood disorder (HCC)  F39   7. Anemia, unspecified type  D64.9   8. Thrombocytopathia (HCC)  D69.1      No orders of the defined types were placed in this encounter.     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 Seizures stable on current regimen of Depakote ER 1250 mg QHS and Tegretol  mg BID. No seizure > 2 years. C/w folic acid along with vitamin D. She is to follow-up with her new psychiatrist. Patient can resume driving. DMV paperwork filled out. Repeat labs next visit in 6 months.      Follow-up:   • 6 months      [employer] ADDRESSED, IF APPLICABLE:  Mammogram for breast cancer screening ordered August, 2021    BILLING DOCUMENTATION:     I spent a total of 20 minutes of face-to-face time in this visit. Over 50% of the time of the visit today was spent on counseling and/or coordination of care wtih the patient and/or family, as above in assessment in plan.    Shiva Jackson MD  Epilepsy and General Neurology  Department of Neurology  Clinical  of Neurology [facility].

## 2021-12-15 DIAGNOSIS — M25.522 LEFT ELBOW PAIN: ICD-10-CM

## 2021-12-15 RX ORDER — MELOXICAM 7.5 MG/1
7.5 TABLET ORAL DAILY
Qty: 30 TABLET | Refills: 0 | Status: SHIPPED | OUTPATIENT
Start: 2021-12-15 | End: 2021-12-27

## 2021-12-26 DIAGNOSIS — M25.522 LEFT ELBOW PAIN: ICD-10-CM

## 2021-12-27 ENCOUNTER — OFFICE VISIT (OUTPATIENT)
Dept: URGENT CARE | Facility: CLINIC | Age: 41
End: 2021-12-27
Payer: MEDICARE

## 2021-12-27 ENCOUNTER — APPOINTMENT (OUTPATIENT)
Dept: RADIOLOGY | Facility: IMAGING CENTER | Age: 41
End: 2021-12-27
Attending: FAMILY MEDICINE
Payer: MEDICARE

## 2021-12-27 VITALS
HEART RATE: 68 BPM | SYSTOLIC BLOOD PRESSURE: 106 MMHG | TEMPERATURE: 96.5 F | DIASTOLIC BLOOD PRESSURE: 70 MMHG | HEIGHT: 67 IN | RESPIRATION RATE: 18 BRPM | OXYGEN SATURATION: 98 % | BODY MASS INDEX: 29.98 KG/M2 | WEIGHT: 191 LBS

## 2021-12-27 DIAGNOSIS — S93.402A SPRAIN OF LEFT ANKLE, UNSPECIFIED LIGAMENT, INITIAL ENCOUNTER: ICD-10-CM

## 2021-12-27 PROCEDURE — 99214 OFFICE O/P EST MOD 30 MIN: CPT | Performed by: FAMILY MEDICINE

## 2021-12-27 PROCEDURE — 73630 X-RAY EXAM OF FOOT: CPT | Mod: TC,LT | Performed by: FAMILY MEDICINE

## 2021-12-27 PROCEDURE — 73610 X-RAY EXAM OF ANKLE: CPT | Mod: TC,LT | Performed by: FAMILY MEDICINE

## 2021-12-27 RX ORDER — MELOXICAM 7.5 MG/1
7.5 TABLET ORAL DAILY
Qty: 30 TABLET | Refills: 0 | Status: SHIPPED | OUTPATIENT
Start: 2021-12-27 | End: 2022-02-07

## 2021-12-27 RX ORDER — HYDROCODONE BITARTRATE AND ACETAMINOPHEN 5; 325 MG/1; MG/1
1 TABLET ORAL EVERY 8 HOURS PRN
Qty: 5 TABLET | Refills: 0 | Status: SHIPPED | OUTPATIENT
Start: 2021-12-27 | End: 2022-01-03

## 2021-12-27 ASSESSMENT — FIBROSIS 4 INDEX: FIB4 SCORE: 1.55

## 2021-12-27 NOTE — PROGRESS NOTES
"Subjective:      Chief Complaint   Patient presents with   • Fall     (L) foot injury from a fall yesterday             Ankle Injury   The incident occurred 2 d ago. The incident occurred  while walking - she slipped on ice. The injury mechanism was an inversion injury. The pain is present in the left ankle. The pain is moderate. The pain has been constant since onset. Pertinent negatives include no inability to bear weight, loss of motion, muscle weakness, numbness or tingling. The symptoms are aggravated by weight bearing and palpation. pt has tried acetaminophen for the symptoms. The treatment provided mild relief.       Social History     Tobacco Use   • Smoking status: Current Every Day Smoker     Packs/day: 2.00     Types: Cigarettes   • Smokeless tobacco: Current User     Types: Chew   • Tobacco comment: 2 packs a day and vape   Vaping Use   • Vaping Use: Never used   Substance Use Topics   • Alcohol use: No   • Drug use: No         Past Medical History:   Diagnosis Date   • Seizure (HCC) 2019   • Anemia, iron deficiency 7/9/2015   • Asthma    • Stroke ([facility])     as an infant         Review of Systems   Constitutional: Negative for fever.   Respiratory: Negative for shortness of breath.    Cardiovascular: Negative for chest pain.   Neurological: Negative for tingling and numbness.   All other systems reviewed and are negative.         Objective:     /70 (BP Location: Left arm, Patient Position: Sitting, BP Cuff Size: Adult long)   Pulse 68   Temp 35.8 °C (96.5 °F) (Temporal)   Resp 18   Ht 1.702 m (5' 7\")   Wt 86.6 kg (191 lb)   SpO2 98%     Physical Exam   Constitutional: pt is oriented to person, place, and time. Pt appears well-developed. No distress.   HENT:   Head: Normocephalic and atraumatic.   Eyes: Conjunctivae are normal.   Cardiovascular: Normal rate and regular rhythm.    Pulmonary/Chest: Effort normal and breath sounds normal.   Musculoskeletal:        Left ankle: pt exhibits swelling " and ecchymosis. Pt exhibits normal range of motion. Tenderness. Lateral malleolus tenderness found. Achilles tendon normal.        Left foot: There is normal range of motion, normal capillary refill and no crepitus.   Neurological: pt is alert and oriented to person, place, and time. No cranial nerve deficit.   Skin: Skin is warm. Pt is not diaphoretic. No erythema.   Psychiatric: His behavior is normal.   Nursing note and vitals reviewed.         Details    Reading Physician Reading Date Result Priority   Henny Morocho M.D.  [phone] 12/27/2021 Urgent Care     Narrative & Impression     12/27/2021 4:02 PM     HISTORY/REASON FOR EXAM:  Fall last night with left ankle pain.        TECHNIQUE/EXAM DESCRIPTION AND NUMBER OF VIEWS:  3 views of the LEFT ankle.     COMPARISON: None     FINDINGS:     There is no focal soft tissue swelling.     There is no displaced fracture or dislocation.     The alignment of the ankle is within normal limits.           IMPRESSION:     1.  Unremarkable left ankle series.             Exam Ended: 12/27/21  4:12 PM Last Resulted: 12/27/21  4:13 PM           Details    Reading Physician Reading Date Result Priority   Henny Morocho M.D.  [phone] 12/27/2021 Urgent Care     Narrative & Impression     12/27/2021 4:02 PM     HISTORY/REASON FOR EXAM:  Fall last night with left foot pain.        TECHNIQUE/EXAM DESCRIPTION AND NUMBER OF VIEWS:  3 views of the LEFT foot.     COMPARISON:  None     FINDINGS:     There is no focal soft tissue swelling.     There is no evidence of displaced fracture or dislocation.     The alignment is maintained.        IMPRESSION:     1.  Unremarkable left foot series.             Exam Ended: 12/27/21  4:11 PM Last Resulted: 12/27/21  4:12 PM                Assessment/Plan:     1. Sprain of left ankle, unspecified ligament, initial encounter  X-rays were personally reviewed by myself.   There is no fracture       Ankle splint placed  NWB on left - crutches  given      - HYDROcodone-acetaminophen (NORCO) 5-325 MG Tab per tablet; Take 1 Tablet by mouth every 8 hours as needed for up to 7 days.  Dispense: 5 Tablet; Refill: 0  - diclofenac sodium (VOLTAREN) 1 % Gel; Apply 4 g topically every 8 hours as needed.  Dispense: 100 g; Refill: 0    .    My total time spent caring for the patient on the day of the encounter was at least 30 minutes.   This does not include time spent on separately billable procedures/tests.

## 2021-12-30 ENCOUNTER — OFFICE VISIT (OUTPATIENT)
Dept: URGENT CARE | Facility: CLINIC | Age: 41
End: 2021-12-30
Payer: MEDICARE

## 2021-12-30 VITALS
OXYGEN SATURATION: 98 % | DIASTOLIC BLOOD PRESSURE: 74 MMHG | BODY MASS INDEX: 29.82 KG/M2 | HEART RATE: 101 BPM | TEMPERATURE: 97.6 F | WEIGHT: 190 LBS | RESPIRATION RATE: 16 BRPM | HEIGHT: 67 IN | SYSTOLIC BLOOD PRESSURE: 120 MMHG

## 2021-12-30 DIAGNOSIS — S93.402A SPRAIN OF LEFT ANKLE, UNSPECIFIED LIGAMENT, INITIAL ENCOUNTER: ICD-10-CM

## 2021-12-30 PROCEDURE — 99214 OFFICE O/P EST MOD 30 MIN: CPT | Performed by: PHYSICIAN ASSISTANT

## 2021-12-30 ASSESSMENT — FIBROSIS 4 INDEX: FIB4 SCORE: 1.55

## 2021-12-30 ASSESSMENT — ENCOUNTER SYMPTOMS
CHILLS: 0
FALLS: 1
FEVER: 0
MYALGIAS: 1

## 2021-12-31 NOTE — PROGRESS NOTES
Subjective:   Lyssa Leblanc is a 41 y.o. female who presents for Foot Pain (Pt has (L) foot pain not getting better x today )        Pt presents with concerns of persistent left ankle pain after an inversion type injury 5 days ago.  She was initially evaluated in urgent care and her x-ray was negative for fracture.  She was provided with a lace up brace as well as crutches.  Patient feels that the brace is making her symptoms worse and also causing more swelling.  She reports persistent difficulty ambulating, pain with movement of the ankle, and little symptomatic improvement since her initial evaluation.  She takes Mobic 7.5 mg daily for a previous elbow injury.  She has been applying topical OTC analgesics, but could not afford the diclofenac that was previously prescribed.  Denies numbness, tingling, redness, warmth of the foot.  She has an appointment on Monday to follow-up with her PCP for recheck.    Review of Systems   Constitutional: Negative for chills and fever.   Musculoskeletal: Positive for falls, joint pain and myalgias.       PMH:  has a past medical history of Anemia, iron deficiency (7/9/2015), Asthma, Seizure ([facility]) (2019), and Stroke ([facility]).  MEDS:   Current Outpatient Medications:   •  Acetaminophen (TYLENOL 8 HOUR PO), Take  by mouth., Disp: , Rfl:   •  meloxicam (MOBIC) 7.5 MG Tab, TAKE 1 TABLET BY MOUTH EVERY DAY, Disp: 30 Tablet, Rfl: 0  •  lisinopril (PRINIVIL) 10 MG Tab, TAKE 1 TABLET BY MOUTH EVERY DAY IN THE MORNING, Disp: 30 Tablet, Rfl: 0  •  traZODone (DESYREL) 50 MG Tab, Take 50 mg by mouth at bedtime., Disp: , Rfl:   •  TEGRETOL  MG TABLET SR 12 HR, TAKE 1 TABLET BY MOUTH TWICE A DAY, Disp: 180 Tablet, Rfl: 0  •  HYDROcodone-acetaminophen (NORCO) 5-325 MG Tab per tablet, Take 1 Tablet by mouth every 8 hours as needed for up to 7 days. (Patient not taking: Reported on 12/30/2021), Disp: 5 Tablet, Rfl: 0  •  diclofenac sodium (VOLTAREN) 1 % Gel, Apply 4 g topically every 8  "hours as needed. (Patient not taking: Reported on 12/30/2021), Disp: 100 g, Rfl: 0  •  DEPAKOTE  MG TABLET SR 24 HR, TAKE 5 TABLETS BY MOUTH AT BEDTIME (NAME BRAND MEDICALLY NECESSARY)., Disp: 450 Tablet, Rfl: 1  •  hydrOXYzine HCl (ATARAX) 25 MG Tab, Take 50 mg by mouth 2 Times a Day. (Patient not taking: Reported on 12/30/2021), Disp: , Rfl:   ALLERGIES: No Known Allergies  SURGHX:   Past Surgical History:   Procedure Laterality Date   • PB LAP,DIAGNOSTIC ABDOMEN N/A 9/2/2020    Procedure: PELVISCOPY;  Surgeon: Garrett Osorio M.D.;  Location: SURGERY [facility];  Service: Gynecology   • SALPINGECTOMY Bilateral 9/2/2020    Procedure: SALPINGECTOMY;  Surgeon: Garrett Osorio M.D.;  Location: SURGERY [facility];  Service: Gynecology   • TUBAL COAGULATION LAPAROSCOPIC BILATERAL       SOCHX:  reports that she has quit smoking. Her smoking use included cigarettes. She smoked 2.00 packs per day. Her smokeless tobacco use includes chew. She reports that she does not drink alcohol and does not use drugs.  FH: Family history was reviewed, no pertinent findings to report   Objective:   /74 (BP Location: Left arm, Patient Position: Sitting, BP Cuff Size: Adult)   Pulse (!) 101   Temp 36.4 °C (97.6 °F) (Temporal)   Resp 16   Ht 1.702 m (5' 7\")   Wt 86.2 kg (190 lb)   SpO2 98%   BMI 29.76 kg/m²   Physical Exam  Vitals reviewed.   Constitutional:       General: She is not in acute distress.     Appearance: Normal appearance. She is well-developed. She is not toxic-appearing.   HENT:      Head: Normocephalic and atraumatic.      Right Ear: External ear normal.      Left Ear: External ear normal.      Nose: Nose normal.   Cardiovascular:      Rate and Rhythm: Normal rate and regular rhythm.   Pulmonary:      Effort: Pulmonary effort is normal. No respiratory distress.      Breath sounds: No stridor.   Musculoskeletal:      Comments: Left ankle:  Appearance: Moderate edema of the lateral ankle and " forefoot.   Mild ecchymosis along lateral ankle.  Palpation: Tender to palpation over ATFL, CFL, PTFL.  Nontender to palpation over midfoot, forefoot, digits, medial and lateral malleoli.   Neurovascular: Dorsalis pedis and posterior tibial pulses +2.  Sensation intact.     Skin:     General: Skin is dry.   Neurological:      Comments: Alert and oriented.          Imagin2021 4:02 PM     HISTORY/REASON FOR EXAM:  Fall last night with left ankle pain.        TECHNIQUE/EXAM DESCRIPTION AND NUMBER OF VIEWS:  3 views of the LEFT ankle.     COMPARISON: None     FINDINGS:     There is no focal soft tissue swelling.     There is no displaced fracture or dislocation.     The alignment of the ankle is within normal limits.           IMPRESSION:     1.  Unremarkable left ankle series.  Assessment/Plan:   1. Sprain of left ankle, unspecified ligament, initial encounter    Other orders  - Acetaminophen (TYLENOL 8 HOUR PO); Take  by mouth.    Forefoot swelling appears dependent and likely exacerbated by brace.  We will have patient stop using lace up brace and switch her to a cam boot instead.  I would like her to elevate above the level of her heart and ice frequently.  She may continue Mobic as prescribed for pain and swelling.  May continue OTC topical analgesics as needed.  Follow-up with PCP as scheduled on Monday for recheck and further management.    I do not feel that the patient needs to see a specialist at this point, but this might be considered if symptoms fail to improve within an appropriate timeframe.    Differential diagnosis, natural history, supportive care, and indications for immediate follow-up discussed.

## 2022-01-03 ENCOUNTER — OFFICE VISIT (OUTPATIENT)
Dept: MEDICAL GROUP | Facility: MEDICAL CENTER | Age: 42
End: 2022-01-03
Attending: PHYSICIAN ASSISTANT
Payer: MEDICARE

## 2022-01-03 VITALS
TEMPERATURE: 97.1 F | HEART RATE: 83 BPM | WEIGHT: 190 LBS | SYSTOLIC BLOOD PRESSURE: 118 MMHG | HEIGHT: 67 IN | RESPIRATION RATE: 18 BRPM | BODY MASS INDEX: 29.82 KG/M2 | DIASTOLIC BLOOD PRESSURE: 72 MMHG | OXYGEN SATURATION: 99 %

## 2022-01-03 DIAGNOSIS — S93.402A SPRAIN OF LEFT ANKLE, UNSPECIFIED LIGAMENT, INITIAL ENCOUNTER: ICD-10-CM

## 2022-01-03 PROCEDURE — 99213 OFFICE O/P EST LOW 20 MIN: CPT | Performed by: PHYSICIAN ASSISTANT

## 2022-01-03 PROCEDURE — 99212 OFFICE O/P EST SF 10 MIN: CPT | Performed by: PHYSICIAN ASSISTANT

## 2022-01-03 ASSESSMENT — FIBROSIS 4 INDEX: FIB4 SCORE: 1.55

## 2022-01-03 NOTE — PROGRESS NOTES
Chief Complaint   Patient presents with   • Follow-Up     Subjective:     HPI:   Lyssa Leblanc is a 41 y.o. female here to discuss the evaluation and management of:    Sprain of left ankle  Lyssa presents today for follow-up.  On 12/26/2021 she was trying to get into her car, slipped on ice and inverted her ankle.  She reports immediate pain and inability to weight-bear.  Shortly following this her ankle swelled up.  She was seen at urgent care on 2 separate occasions in which she had plain films of the ankle and foot which came back normal.  She was placed in a cam walker, given crutches, prescribed short-term pain medication and instructed to schedule a follow-up visit with her PCP.  She reports continued swelling and pain.  She is taking combination of Tylenol and Aleve for pain control.    ROS  See HPI.     No Known Allergies    Current medicines (including changes today)  Current Outpatient Medications   Medication Sig Dispense Refill   • Acetaminophen (TYLENOL 8 HOUR PO) Take  by mouth.     • meloxicam (MOBIC) 7.5 MG Tab TAKE 1 TABLET BY MOUTH EVERY DAY 30 Tablet 0   • lisinopril (PRINIVIL) 10 MG Tab TAKE 1 TABLET BY MOUTH EVERY DAY IN THE MORNING 30 Tablet 0   • traZODone (DESYREL) 50 MG Tab Take 50 mg by mouth at bedtime.     • TEGRETOL  MG TABLET SR 12 HR TAKE 1 TABLET BY MOUTH TWICE A  Tablet 0   • DEPAKOTE  MG TABLET SR 24 HR TAKE 5 TABLETS BY MOUTH AT BEDTIME (NAME BRAND MEDICALLY NECESSARY). 450 Tablet 1     No current facility-administered medications for this visit.       Social History     Tobacco Use   • Smoking status: Former Smoker     Packs/day: 2.00     Types: Cigarettes   • Smokeless tobacco: Current User     Types: Chew   • Tobacco comment: 2 packs a day and vape   Vaping Use   • Vaping Use: Every day   • Substances: Flavoring   • Devices: Pre-filled pod   Substance Use Topics   • Alcohol use: No   • Drug use: No       Patient Active Problem List    Diagnosis Date  "Noted   • Sprain of left ankle 01/03/2022   • Left elbow pain 10/20/2021   • Post traumatic stress disorder 07/20/2021   • Ulnar nerve compression 01/22/2021   • Depression, major 10/09/2020   • Hypertension 09/29/2020   • Abnormal liver enzymes 09/29/2020   • Asthma 09/02/2020   • Arthralgia of right knee 07/01/2020   • Osteopenia 01/22/2020   • Carpal tunnel syndrome, left 12/27/2019   • Anxiety 09/13/2019   • Insomnia 09/13/2019   • Mood disorder ([facility]) 09/13/2019   • Learning disability 06/26/2019   • Right spastic hemiplegia ([facility]) 06/26/2019   • Foot drop, right 05/30/2018   • Seizure disorder ([facility]) 05/23/2018   • Hyperlipidemia 04/24/2018   • Family history of cardiovascular disease 04/11/2018   • History of stroke 04/11/2018   • Family tension 11/29/2016   • Other specified postprocedural states 10/08/2015   • Anemia, iron deficiency 07/09/2015   • Family history of diabetes mellitus 07/09/2015   • Obesity 06/05/2015   • Scoliosis 11/25/2014       Family History   Problem Relation Age of Onset   • Heart Disease Mother    • Diabetes Mother    • Hypertension Father    • Diabetes Maternal Grandmother    • Cancer Neg Hx    • Hyperlipidemia Neg Hx         Objective:     /72 (BP Location: Left arm, Patient Position: Sitting, BP Cuff Size: Adult)   Pulse 83   Temp 36.2 °C (97.1 °F) (Skin)   Resp 18   Ht 1.702 m (5' 7\")   Wt 86.2 kg (190 lb)   SpO2 99%  Body mass index is 29.76 kg/m².    Physical Exam:  Physical Exam  Vitals reviewed.   Constitutional:       Appearance: Normal appearance.   HENT:      Head: Normocephalic.      Right Ear: External ear normal.      Left Ear: External ear normal.   Cardiovascular:      Rate and Rhythm: Normal rate and regular rhythm.      Heart sounds: Normal heart sounds.   Pulmonary:      Effort: Pulmonary effort is normal.      Breath sounds: Normal breath sounds.   Musculoskeletal:      Left ankle: Swelling present. Tenderness present over the ATF ligament, AITF ligament, " CF ligament and posterior TF ligament. Decreased range of motion.   Neurological:      Mental Status: She is alert.       Assessment and Plan:     The following treatment plan was discussed:    1. Sprain of left ankle, unspecified ligament, initial encounter  - This is an acute condition.  - Plan: Recent plain films negative for fracture.  Instructed the patient to continue in the cam walker for the next 4 to 6 weeks.  In 1 week, I would like for her to start at home PT with range of motion exercises demonstrated in clinic today.  Continue on combination of Tylenol and ibuprofen.  Encourage the patient to purchase Voltaren gel 1% and apply twice daily for added pain relief.  Ice and elevate Referral to Orthopedics placed per patient request.  Encouraged the patient to wait a few weeks to see if pain improves with at home physical therapy.  Instructed the patient to follow-up with orthopedics if her pain persists or worsens despite treatment discussed above.     Any change or worsening of signs or symptoms, patient encouraged to follow-up or report to emergency room for further evaluation. Patient verbalizes understanding and agrees.    Follow-Up: Return if symptoms worsen or fail to improve.      PLEASE NOTE: This dictation was created using voice recognition software. I have made every reasonable attempt to correct obvious errors, but I expect that there are errors of grammar and possibly content that I did not discover before finalizing the note.

## 2022-01-03 NOTE — ASSESSMENT & PLAN NOTE
Lyssa presents today for follow-up.  On 12/26/2021 she was trying to get into her car, slipped on ice and inverted her ankle.  She reports immediate pain and inability to weight-bear.  Shortly following this her ankle swelled up.  She was seen at urgent care on 2 separate occasions in which she had plain films of the ankle and foot which came back normal.  She was placed in a cam walker, given crutches, prescribed short-term pain medication and instructed to schedule a follow-up visit with her PCP.  She reports continued swelling and pain.  She is taking combination of Tylenol and Aleve for pain control.

## 2022-01-08 DIAGNOSIS — I10 HYPERTENSION, UNSPECIFIED TYPE: ICD-10-CM

## 2022-01-10 RX ORDER — LISINOPRIL 10 MG/1
TABLET ORAL
Qty: 30 TABLET | Refills: 0 | Status: SHIPPED | OUTPATIENT
Start: 2022-01-10 | End: 2022-02-07

## 2022-01-12 ENCOUNTER — TELEPHONE (OUTPATIENT)
Dept: MEDICAL GROUP | Facility: MEDICAL CENTER | Age: 42
End: 2022-01-12

## 2022-01-13 NOTE — TELEPHONE ENCOUNTER
Paper: Vasquez for review    Scanned: under  for better review it will also be place on our basket

## 2022-01-30 DIAGNOSIS — R56.9 SEIZURES (HCC): ICD-10-CM

## 2022-01-30 DIAGNOSIS — G40.909 SEIZURE DISORDER (HCC): ICD-10-CM

## 2022-02-02 RX ORDER — DIVALPROEX SODIUM 250 MG/1
1250 TABLET, EXTENDED RELEASE ORAL
Qty: 450 TABLET | Refills: 1 | Status: SHIPPED
Start: 2022-02-02 | End: 2022-05-09

## 2022-02-03 DIAGNOSIS — G40.909 SEIZURE DISORDER (HCC): ICD-10-CM

## 2022-02-03 DIAGNOSIS — R56.9 SEIZURES (HCC): ICD-10-CM

## 2022-02-04 DIAGNOSIS — I10 HYPERTENSION, UNSPECIFIED TYPE: ICD-10-CM

## 2022-02-04 DIAGNOSIS — M25.522 LEFT ELBOW PAIN: ICD-10-CM

## 2022-02-04 NOTE — TELEPHONE ENCOUNTER
Received request via: Pharmacy    Was the patient seen in the last year in this department? Yes    Does the patient have an active prescription (recently filled or refills available) for medication(s) requested? No     Last visit 1/3/22

## 2022-02-07 RX ORDER — LISINOPRIL 10 MG/1
TABLET ORAL
Qty: 30 TABLET | Refills: 0 | Status: SHIPPED | OUTPATIENT
Start: 2022-02-07 | End: 2022-03-04

## 2022-02-07 RX ORDER — CARBAMAZEPINE 400 MG/1
TABLET, EXTENDED RELEASE ORAL
Qty: 170 TABLET | Refills: 1 | Status: SHIPPED | OUTPATIENT
Start: 2022-02-07 | End: 2022-05-17

## 2022-02-07 RX ORDER — MELOXICAM 7.5 MG/1
7.5 TABLET ORAL DAILY
Qty: 30 TABLET | Refills: 0 | Status: SHIPPED | OUTPATIENT
Start: 2022-02-07 | End: 2022-04-11

## 2022-02-08 ENCOUNTER — OFFICE VISIT (OUTPATIENT)
Dept: MEDICAL GROUP | Facility: MEDICAL CENTER | Age: 42
End: 2022-02-08
Attending: PHYSICIAN ASSISTANT
Payer: MEDICARE

## 2022-02-08 ENCOUNTER — TELEPHONE (OUTPATIENT)
Dept: BEHAVIORAL HEALTH | Facility: PSYCHIATRIC FACILITY | Age: 42
End: 2022-02-08

## 2022-02-08 VITALS
HEART RATE: 68 BPM | BODY MASS INDEX: 29.3 KG/M2 | TEMPERATURE: 97 F | RESPIRATION RATE: 18 BRPM | DIASTOLIC BLOOD PRESSURE: 80 MMHG | SYSTOLIC BLOOD PRESSURE: 104 MMHG | HEIGHT: 67 IN | WEIGHT: 186.7 LBS | OXYGEN SATURATION: 97 %

## 2022-02-08 DIAGNOSIS — G47.09 OTHER INSOMNIA: ICD-10-CM

## 2022-02-08 DIAGNOSIS — F32.9 MAJOR DEPRESSIVE DISORDER, REMISSION STATUS UNSPECIFIED, UNSPECIFIED WHETHER RECURRENT: ICD-10-CM

## 2022-02-08 DIAGNOSIS — F39 MOOD DISORDER (HCC): ICD-10-CM

## 2022-02-08 DIAGNOSIS — F33.9 RECURRENT MAJOR DEPRESSIVE DISORDER, REMISSION STATUS UNSPECIFIED (HCC): ICD-10-CM

## 2022-02-08 DIAGNOSIS — F43.10 POST TRAUMATIC STRESS DISORDER: ICD-10-CM

## 2022-02-08 PROCEDURE — 99213 OFFICE O/P EST LOW 20 MIN: CPT | Performed by: PHYSICIAN ASSISTANT

## 2022-02-08 RX ORDER — TRAZODONE HYDROCHLORIDE 50 MG/1
50 TABLET ORAL
Qty: 30 TABLET | Refills: 4 | Status: SHIPPED | OUTPATIENT
Start: 2022-02-08 | End: 2022-02-15

## 2022-02-08 ASSESSMENT — FIBROSIS 4 INDEX: FIB4 SCORE: 1.55

## 2022-02-08 NOTE — LETTER
February 8, 2022      To Whom It May Concern:    This letter is being written on behalf Lyssa Leblanc to inform you that it is safe for her to obtain a medical marijuana card to help with her insomnia.       Thank you,        Ileana Araya P.A.-C.

## 2022-02-08 NOTE — TELEPHONE ENCOUNTER
Received request via: Patient    Was the patient seen in the last year in this department? Yes    Does the patient have an active prescription (recently filled or refills available) for medication(s) requested? No     Patient called she needs refill for trazodone 50 MG. [facility] on [address]. Thank you

## 2022-02-08 NOTE — ASSESSMENT & PLAN NOTE
Lyssa presents today for follow up. She reports that she has had increased difficulty in communicating with her psychiatrist regarding her medications. She has been out of her medications for some time now and would like to reestablish with a new provider. She is requesting a referral to do so.

## 2022-02-08 NOTE — PROGRESS NOTES
Chief Complaint   Patient presents with   • Follow-Up     Subjective:     HPI:   Lyssa Leblanc is a 41 y.o. female here to discuss the evaluation and management of:    Insomnia  Lyssa presents today for follow up. She reports that she has been experiencing insomnia. She has difficulty falling and staying asleep on a daily basis. This has caused her increased difficulty in functioning throughout the day. Treatments tried include marijuana which has been beneficial for her. She is requesting a letter today stating that it is okay for her to obtain a medical marijuana card.     Depression, major  Lyssa presents today for follow up. She reports that she has had increased difficulty in communicating with her psychiatrist regarding her medications. She has been out of her medications for some time now and would like to reestablish with a new provider. She is requesting a referral to do so.     ROS  See HPI.     No Known Allergies    Current medicines (including changes today)  Current Outpatient Medications   Medication Sig Dispense Refill   • traZODone (DESYREL) 50 MG Tab Take 1 Tablet by mouth at bedtime. 30 Tablet 4   • TEGRETOL  MG TABLET SR 12 HR TAKE 1 TABLET BY MOUTH TWICE A  Tablet 1   • meloxicam (MOBIC) 7.5 MG Tab TAKE 1 TABLET BY MOUTH EVERY DAY 30 Tablet 0   • lisinopril (PRINIVIL) 10 MG Tab TAKE 1 TABLET BY MOUTH EVERY DAY IN THE MORNING 30 Tablet 0   • divalproex ER (DEPAKOTE ER) 250 MG TABLET SR 24 HR Take 5 Tablets by mouth at bedtime for 180 days. Take 5 tablets at bedtime. 450 Tablet 1   • Acetaminophen (TYLENOL 8 HOUR PO) Take  by mouth.       No current facility-administered medications for this visit.       Social History     Tobacco Use   • Smoking status: Former Smoker     Packs/day: 2.00     Types: Cigarettes   • Smokeless tobacco: Current User     Types: Chew   • Tobacco comment: 2 packs a day and vape   Vaping Use   • Vaping Use: Every day   • Substances: Flavoring   •  "Devices: Pre-filled pod   Substance Use Topics   • Alcohol use: No   • Drug use: No       Patient Active Problem List    Diagnosis Date Noted   • Sprain of left ankle 01/03/2022   • Left elbow pain 10/20/2021   • Post traumatic stress disorder 07/20/2021   • Ulnar nerve compression 01/22/2021   • Depression, major 10/09/2020   • Hypertension 09/29/2020   • Abnormal liver enzymes 09/29/2020   • Asthma 09/02/2020   • Arthralgia of right knee 07/01/2020   • Osteopenia 01/22/2020   • Carpal tunnel syndrome, left 12/27/2019   • Anxiety 09/13/2019   • Insomnia 09/13/2019   • Mood disorder (HCC) 09/13/2019   • Learning disability 06/26/2019   • Right spastic hemiplegia (HCC) 06/26/2019   • Foot drop, right 05/30/2018   • Seizure disorder ([facility]) 05/23/2018   • Hyperlipidemia 04/24/2018   • Family history of cardiovascular disease 04/11/2018   • History of stroke 04/11/2018   • Family tension 11/29/2016   • Other specified postprocedural states 10/08/2015   • Anemia, iron deficiency 07/09/2015   • Family history of diabetes mellitus 07/09/2015   • Obesity 06/05/2015   • Scoliosis 11/25/2014       Family History   Problem Relation Age of Onset   • Heart Disease Mother    • Diabetes Mother    • Hypertension Father    • Diabetes Maternal Grandmother    • Cancer Neg Hx    • Hyperlipidemia Neg Hx         Objective:     /80 (BP Location: Left arm, Patient Position: Sitting, BP Cuff Size: Adult)   Pulse 68   Temp 36.1 °C (97 °F) (Skin)   Resp 18   Ht 1.702 m (5' 7\")   Wt 84.7 kg (186 lb 11.2 oz)   SpO2 97%  Body mass index is 29.24 kg/m².    Physical Exam:  Physical Exam  Vitals reviewed.   Constitutional:       Appearance: Normal appearance.   HENT:      Right Ear: External ear normal.      Left Ear: External ear normal.   Eyes:      Pupils: Pupils are equal, round, and reactive to light.   Cardiovascular:      Rate and Rhythm: Normal rate and regular rhythm.      Heart sounds: Normal heart sounds.   Pulmonary:      " Effort: Pulmonary effort is normal.      Breath sounds: Normal breath sounds.   Musculoskeletal:      Cervical back: Normal range of motion.   Neurological:      General: No focal deficit present.      Mental Status: She is alert and oriented to person, place, and time.   Psychiatric:         Mood and Affect: Mood normal.         Behavior: Behavior normal.       Assessment and Plan:     The following treatment plan was discussed:    1. Other insomnia  - This is a chronic condition.  - Plan: Encouraged patient to seek out a dispensary with a medical doctor on staff to talk about obtaining a medical marijuana card. Note written for the patient to give to her father.    2. Major depressive disorder, remission status unspecified, unspecified whether recurrent, Mood disorder (HCC),  Post traumatic stress disorder  - Referral to Psychiatry per patient request.       Any change or worsening of signs or symptoms, patient encouraged to follow-up or report to emergency room for further evaluation. Patient verbalizes understanding and agrees.    Follow-Up: Return if symptoms worsen or fail to improve.      PLEASE NOTE: This dictation was created using voice recognition software. I have made every reasonable attempt to correct obvious errors, but I expect that there are errors of grammar and possibly content that I did not discover before finalizing the note.

## 2022-02-08 NOTE — ASSESSMENT & PLAN NOTE
Lyssa presents today for follow up. She reports that she has been experiencing insomnia. She has difficulty falling and staying asleep on a daily basis. This has caused her increased difficulty in functioning throughout the day. Treatments tried include marijuana which has been beneficial for her. She is requesting a letter today stating that it is okay for her to obtain a medical marijuana card.

## 2022-02-15 ENCOUNTER — OFFICE VISIT (OUTPATIENT)
Dept: BEHAVIORAL HEALTH | Facility: PSYCHIATRIC FACILITY | Age: 42
End: 2022-02-15
Payer: MEDICARE

## 2022-02-15 DIAGNOSIS — F33.9 RECURRENT MAJOR DEPRESSIVE DISORDER, REMISSION STATUS UNSPECIFIED (HCC): ICD-10-CM

## 2022-02-15 PROCEDURE — 99213 OFFICE O/P EST LOW 20 MIN: CPT | Mod: GC | Performed by: STUDENT IN AN ORGANIZED HEALTH CARE EDUCATION/TRAINING PROGRAM

## 2022-02-15 RX ORDER — HYDROXYZINE 50 MG/1
50 TABLET, FILM COATED ORAL 2 TIMES DAILY PRN
Qty: 60 TABLET | Refills: 1 | Status: SHIPPED | OUTPATIENT
Start: 2022-02-15 | End: 2022-03-14

## 2022-02-15 RX ORDER — TRAZODONE HYDROCHLORIDE 50 MG/1
150 TABLET ORAL
Qty: 90 TABLET | Refills: 1 | Status: SHIPPED | OUTPATIENT
Start: 2022-02-15 | End: 2022-03-15

## 2022-02-16 NOTE — PROGRESS NOTES
"[facility]  Medication Management Note    Evaluation completed by: Jeff Pettit M.D.   Date of Service: 02/15/22   Appointment type: in-office appointment.    Information below was collected from: patient    Special language or communication needs: No  Responded to any questions about patient rights: No  Reviewed limits of confidentiality: Yes  Confidentiality: The patient was informed that her medical records are confidential except for use by the treatment team in this clinic and others involved in her care.  Records may be shared with outside entities if the patient signs a release of information.  Information may be shared with appropriate authorities without a release of information to report instances of child/elder abuse or if it is determined she is in imminent risk of harm to self or others.     CHIEF COMPLAINT/REASON FOR VISIT  PTSD medication management and follow up    HISTORY OF PRESENT ILLNESS  Lyssa Leblanc is a 41 y.o. old female with h/o depression, PTSD, learning disability, seizure disorder, h/o stroke (patient reports at birth), right spastic hemiplegia who presents today for follow up management of PTSD, and insomnia.   Pt was last seen in July 2021, at which time the plan was to continue medications.     Patient reports her primary concern is her insomnia. Patient reports having difficulty in receiving renewal/refill of her trazodone. She notes that both before and after receiving trazodone 50mg patient's sleep was \"shitty\" noting increased latency of several hours. She notes decreased energy/fatigue as well. Patient denies guilt/worthlessness, nor changes in ability to focus/pay attention. Patient endorses both increased restlessness/as well as subjective feelings of being slowed/heavy depending on the day/moment. Discussed with patient trialing trazodone 150mg PO QHS PRN vs. Mirtazapine 15mg PO QHS and patient requests trialing both. Writer discusses " "trialing monotherapy as a preferable strategy and patient is amicable to proceeding with trial of trazodone 150mg PO QHD PRN.    Patient reports her seizures are well controlled on current combination of divalproex as well as carbemazepine. She notes history of \"grand mal\" and \"petite mal\" seizures. Reports last seizure was > 1 year ago. Notes some daytime somnolence with divalproex hence the change to QHS dosing.    In relation to PTSD/trauma related symptoms patient endorses continued hypervigilance, irritability, and derealization while not endorsing flashbacks, intrusive memories, or nightmares presently.      PSYCHOSOCIAL CHANGES SINCE LAST VISIT   None noted, patient does not report difficulties with children. She notes she continues to not get along with her father well, while also noting he helps her with logistics of paying bills. Patient continues to live in duplex with her two children and father lives 1.5 blocks down the street. Patient reports income sources are her social security, her son's social security, as well as child support.    CURRENT MEDICATIONS   • Trazodone 50mg PO QHS  • hydroxyzine 50mg PO BID PRN  • Carbamazepine 400mg XR 12HR PO daily (prescribed by neurology)  • Divalproex 1250 PO QHS (prescribed by neurology)   • Meloxicam 7.5mg daily  • Gabapentin 300mg PO TID  • Lisinopril 10mg daily    MEDICAL REVIEW OF SYSTEMS  ROS: patient denies nausea, vomiting, chest pain, fever, SOB, cough, difficulties with urination or bowel movements.      ALLERGIES  No Known Allergies       MEDICAL HISTORY  Past Medical History:  7/9/2015: Anemia, iron deficiency  No date: Asthma  2019: Seizure (HCC)  No date: Stroke (HCC)      Comment:  as an infant   Past Surgical History:   Procedure Laterality Date   • IL LAP,DIAGNOSTIC ABDOMEN N/A 9/2/2020    Procedure: PELVISCOPY;  Surgeon: Garrett Osorio M.D.;  Location: SURGERY [facility];  Service: Gynecology   • SALPINGECTOMY Bilateral 9/2/2020    " "Procedure: SALPINGECTOMY;  Surgeon: Garrett Osorio M.D.;  Location: SURGERY [facility];  Service: Gynecology   • TUBAL COAGULATION LAPAROSCOPIC BILATERAL         PHYSICAL EXAMINATION  Vital signs: There were no vitals taken for this visit.  Musculoskeletal: Gait is shuffling gait with orthopedic boot on foot/ankle of one lower extremity.    Abnormal movements: other than gait 2/2 boot, none noted    MENTAL STATUS EXAMINATION    General: Lyssa Leblanc appears stated age and exhibits grooming which is casual.  Hygiene is fair.     Behavior: Pt is mildly irritable at times but otherwise calm and cooperative with interview.  No apparent distress.  Eye contact is appropriate.   Psychomotor: Psychomotor agitation or retardation Not noted.  Tics or tremors not noted.  Speech: rate within normal limits and volume within normal limits  Language: english  Mood: \"okay\"  Affect: mildly restricted, mildly skewed toward anxious spectrum predominantly neutral, but appropriately responsive to conversation  Thought Process: Logical and Goal-directed  Thought Content: denies suicidal ideation, denies homicidal ideation. Within normal limits  Perception: denies auditory hallucinations, denies visual hallucinations. No delusions noted on interview.  Also noted on exam: N/A  Attention span and concentration: appropriate  Orientation: Alert and Fully Oriented  Recent and remote memory: No gross evidence of memory deficits  Insight: Adequate  Judgment: Adequate     Labs:  2/26/21:  Carbamazapine: 10  VPA: 59.4      SAFETY ASSESSMENT - RISK TO SELF  • Current suicide attempts or self harm: No  • Past suicide attempts or self harm: No  • History of suicide by family member: No  • History of suicide by friend/significant other: No  • Recent change in amount/specificity/intensity of suicidal thoughts or self-harm behavior: No  • Ongoing substance use disorder: No  • Current access to firearms, medications, or other identified " means of suicide/self-harm: unknown fire arm acces  • If yes, willing to restrict access to means of suicide/self-harm: N/a  • Protective factors present: Yes     SAFETY ASSESSMENT - RISK TO OTHERS  • Current aggressive behavior or risk to others: No  • Past aggressive behavior or risk to others: No  • Recent change in amount/specificity/intensity of thoughts or threats to harm others? No  • Current access to firearms/other identified means of harm? uknown  • If yes, willing to restrict access to weapons/means of harm? N/a     CURRENT RISK ASSESSMENT       Suicide: Low       Homicide: Low       Self-Harm: Low       Relapse: Low       Crisis Safety Plan Reviewed: Will review at follow up    [place]  records  N/A    ASSESSMENT  PTSD vs. Trauma and Other Stressor Related Disorder  Unspecified Anxiety Disorder  Insomnia    H/o Learning Disability  R/O Intellectrual Disability    Seizure DIsorder    Lyssa Leblanc is a 41 y.o. old female with h/o depression, PTSD, learning disability, seizure disorder, h/o stroke (patient reports at birth), right spastic hemiplegia who presents today for follow up management of PTSD, and insomnia.      Patient's history of seizure disorder as well as associated medications for seizure ppx likely have a significant impact on her mood as well as associated side effects however she reports stability on these medications presently and has follow up established with neurology.    Patient primary concern today is her insomnia which she reports is not well controlled before restarting trazodone after a lapse in her prescription nor after resuming medication for approximately one month. Discussed increasing trazodone to 150mg PO QHS PRN vs. trialing mirtazapine and elected to increase trazodone presently. Patient reports hydroxyzine is helpful as a PRN anxiety medication for her presently.    Patient's social circumstances have not changed and she has stable income in her social security  that she receives (as well as her sons) but notes child support she should be receiving / has received in the past is late presently.      DIAGNOSES/PLAN  Problem 1: Insomnia  Status: Active/Worsening  • Medications: INCREASE trazodone to 150mg PO QHS PRN for insomnia   • Psychotherapy: not presently going to therapy  • Labs/studies: none  • Other: none    Problem 2: Unspecified Anxiety Disorder  Status: Unchanged, present but responsive to medication  • Medications: hydroxyzine 50mg PO BID PRN  • Psychotherapy: previously attending therapy, not presently  • Labs/studies: none  • Other: none    • Medication options, alternatives (including no medications) and medication risks/benefits/side effects were discussed in detail.  • The patient was advised to call, message clinician on [employer], or come in to the clinic if symptoms worsen or if questions/issues regarding their medications arise.  The patient verbalized understanding and agreement.    • The patient was educated to call 911, call the suicide hotline, or go to the local ER if having thoughts of suicide or homicide.  The patient verbalized understanding and agreement.   • The proposed treatment plan was discussed with the patient who was provided the opportunity to ask questions and make suggestions regarding alternative treatment. Patient verbalized understanding and expressed agreement with the plan.      Return to clinic in 1 month or sooner if symptoms worsen.    This appointment was supervised by attending psychiatrist, Misbah López MD, who agrees with assessment and treatment plan.  See attending attestation for more details.       Jeff Pettit M.D.  02/15/22

## 2022-03-04 DIAGNOSIS — I10 HYPERTENSION, UNSPECIFIED TYPE: ICD-10-CM

## 2022-03-07 RX ORDER — LISINOPRIL 10 MG/1
TABLET ORAL
Qty: 30 TABLET | Refills: 0 | Status: SHIPPED | OUTPATIENT
Start: 2022-03-07 | End: 2022-04-11

## 2022-03-14 RX ORDER — HYDROXYZINE 50 MG/1
50 TABLET, FILM COATED ORAL 2 TIMES DAILY PRN
Qty: 60 TABLET | Refills: 1 | Status: SHIPPED | OUTPATIENT
Start: 2022-03-14 | End: 2022-03-15

## 2022-03-15 ENCOUNTER — APPOINTMENT (OUTPATIENT)
Dept: RADIOLOGY | Facility: MEDICAL CENTER | Age: 42
End: 2022-03-15
Attending: EMERGENCY MEDICINE
Payer: MEDICARE

## 2022-03-15 ENCOUNTER — APPOINTMENT (OUTPATIENT)
Dept: RADIOLOGY | Facility: MEDICAL CENTER | Age: 42
End: 2022-03-15
Attending: ORTHOPAEDIC SURGERY
Payer: MEDICARE

## 2022-03-15 ENCOUNTER — OFFICE VISIT (OUTPATIENT)
Dept: BEHAVIORAL HEALTH | Facility: PSYCHIATRIC FACILITY | Age: 42
End: 2022-03-15
Payer: MEDICARE

## 2022-03-15 ENCOUNTER — HOSPITAL ENCOUNTER (OUTPATIENT)
Facility: MEDICAL CENTER | Age: 42
End: 2022-03-17
Attending: EMERGENCY MEDICINE | Admitting: INTERNAL MEDICINE
Payer: MEDICARE

## 2022-03-15 DIAGNOSIS — S82.891A ANKLE FRACTURE, RIGHT, CLOSED, INITIAL ENCOUNTER: ICD-10-CM

## 2022-03-15 DIAGNOSIS — S82.401A CLOSED FRACTURE OF RIGHT TIBIA AND FIBULA, INITIAL ENCOUNTER: ICD-10-CM

## 2022-03-15 DIAGNOSIS — F43.10 POST TRAUMATIC STRESS DISORDER: ICD-10-CM

## 2022-03-15 DIAGNOSIS — S82.201A CLOSED FRACTURE OF RIGHT TIBIA AND FIBULA, INITIAL ENCOUNTER: ICD-10-CM

## 2022-03-15 PROBLEM — E87.6 HYPOKALEMIA: Status: ACTIVE | Noted: 2022-03-15

## 2022-03-15 LAB
ALBUMIN SERPL BCP-MCNC: 5.5 G/DL (ref 3.2–4.9)
ALBUMIN/GLOB SERPL: 1.8 G/DL
ALP SERPL-CCNC: 91 U/L (ref 30–99)
ALT SERPL-CCNC: 14 U/L (ref 2–50)
ANION GAP SERPL CALC-SCNC: 18 MMOL/L (ref 7–16)
AST SERPL-CCNC: 22 U/L (ref 12–45)
BASOPHILS # BLD AUTO: 0.3 % (ref 0–1.8)
BASOPHILS # BLD: 0.04 K/UL (ref 0–0.12)
BILIRUB SERPL-MCNC: 0.5 MG/DL (ref 0.1–1.5)
BUN SERPL-MCNC: 8 MG/DL (ref 8–22)
CALCIUM SERPL-MCNC: 10.3 MG/DL (ref 8.5–10.5)
CHLORIDE SERPL-SCNC: 103 MMOL/L (ref 96–112)
CO2 SERPL-SCNC: 20 MMOL/L (ref 20–33)
CREAT SERPL-MCNC: 0.77 MG/DL (ref 0.5–1.4)
EOSINOPHIL # BLD AUTO: 0.01 K/UL (ref 0–0.51)
EOSINOPHIL NFR BLD: 0.1 % (ref 0–6.9)
ERYTHROCYTE [DISTWIDTH] IN BLOOD BY AUTOMATED COUNT: 43.3 FL (ref 35.9–50)
GFR SERPLBLD CREATININE-BSD FMLA CKD-EPI: 99 ML/MIN/1.73 M 2
GLOBULIN SER CALC-MCNC: 3.1 G/DL (ref 1.9–3.5)
GLUCOSE SERPL-MCNC: 119 MG/DL (ref 65–99)
HCT VFR BLD AUTO: 36 % (ref 37–47)
HGB BLD-MCNC: 11.7 G/DL (ref 12–16)
IMM GRANULOCYTES # BLD AUTO: 0.06 K/UL (ref 0–0.11)
IMM GRANULOCYTES NFR BLD AUTO: 0.4 % (ref 0–0.9)
LYMPHOCYTES # BLD AUTO: 1.24 K/UL (ref 1–4.8)
LYMPHOCYTES NFR BLD: 9.1 % (ref 22–41)
MCH RBC QN AUTO: 30.5 PG (ref 27–33)
MCHC RBC AUTO-ENTMCNC: 32.5 G/DL (ref 33.6–35)
MCV RBC AUTO: 93.8 FL (ref 81.4–97.8)
MONOCYTES # BLD AUTO: 0.63 K/UL (ref 0–0.85)
MONOCYTES NFR BLD AUTO: 4.6 % (ref 0–13.4)
NEUTROPHILS # BLD AUTO: 11.59 K/UL (ref 2–7.15)
NEUTROPHILS NFR BLD: 85.5 % (ref 44–72)
NRBC # BLD AUTO: 0 K/UL
NRBC BLD-RTO: 0 /100 WBC
PLATELET # BLD AUTO: 164 K/UL (ref 164–446)
PMV BLD AUTO: 13.3 FL (ref 9–12.9)
POTASSIUM SERPL-SCNC: 3.5 MMOL/L (ref 3.6–5.5)
PROT SERPL-MCNC: 8.6 G/DL (ref 6–8.2)
RBC # BLD AUTO: 3.84 M/UL (ref 4.2–5.4)
SARS-COV+SARS-COV-2 AG RESP QL IA.RAPID: NOTDETECTED
SODIUM SERPL-SCNC: 141 MMOL/L (ref 135–145)
SPECIMEN SOURCE: NORMAL
WBC # BLD AUTO: 13.6 K/UL (ref 4.8–10.8)

## 2022-03-15 PROCEDURE — 94760 N-INVAS EAR/PLS OXIMETRY 1: CPT

## 2022-03-15 PROCEDURE — G0378 HOSPITAL OBSERVATION PER HR: HCPCS

## 2022-03-15 PROCEDURE — A9270 NON-COVERED ITEM OR SERVICE: HCPCS | Performed by: INTERNAL MEDICINE

## 2022-03-15 PROCEDURE — 700111 HCHG RX REV CODE 636 W/ 250 OVERRIDE (IP): Performed by: INTERNAL MEDICINE

## 2022-03-15 PROCEDURE — 700105 HCHG RX REV CODE 258: Performed by: INTERNAL MEDICINE

## 2022-03-15 PROCEDURE — 96374 THER/PROPH/DIAG INJ IV PUSH: CPT

## 2022-03-15 PROCEDURE — 36415 COLL VENOUS BLD VENIPUNCTURE: CPT

## 2022-03-15 PROCEDURE — 700111 HCHG RX REV CODE 636 W/ 250 OVERRIDE (IP): Performed by: EMERGENCY MEDICINE

## 2022-03-15 PROCEDURE — 99213 OFFICE O/P EST LOW 20 MIN: CPT | Performed by: STUDENT IN AN ORGANIZED HEALTH CARE EDUCATION/TRAINING PROGRAM

## 2022-03-15 PROCEDURE — 99222 1ST HOSP IP/OBS MODERATE 55: CPT | Mod: 57 | Performed by: ORTHOPAEDIC SURGERY

## 2022-03-15 PROCEDURE — 80053 COMPREHEN METABOLIC PANEL: CPT

## 2022-03-15 PROCEDURE — 73700 CT LOWER EXTREMITY W/O DYE: CPT | Mod: RT,MG

## 2022-03-15 PROCEDURE — 99220 PR INITIAL OBSERVATION CARE,LEVL III: CPT | Performed by: INTERNAL MEDICINE

## 2022-03-15 PROCEDURE — 85025 COMPLETE CBC W/AUTO DIFF WBC: CPT

## 2022-03-15 PROCEDURE — 99285 EMERGENCY DEPT VISIT HI MDM: CPT

## 2022-03-15 PROCEDURE — 87426 SARSCOV CORONAVIRUS AG IA: CPT

## 2022-03-15 PROCEDURE — 700102 HCHG RX REV CODE 250 W/ 637 OVERRIDE(OP): Performed by: INTERNAL MEDICINE

## 2022-03-15 PROCEDURE — 96372 THER/PROPH/DIAG INJ SC/IM: CPT

## 2022-03-15 RX ORDER — OXYCODONE HYDROCHLORIDE 5 MG/1
5 TABLET ORAL
Status: DISCONTINUED | OUTPATIENT
Start: 2022-03-15 | End: 2022-03-17 | Stop reason: HOSPADM

## 2022-03-15 RX ORDER — HYDROXYZINE 50 MG/1
50 TABLET, FILM COATED ORAL 2 TIMES DAILY
Status: ON HOLD | COMMUNITY
End: 2022-03-16 | Stop reason: SDUPTHER

## 2022-03-15 RX ORDER — CARBAMAZEPINE 400 MG/1
400 TABLET, EXTENDED RELEASE ORAL 2 TIMES DAILY
Status: DISCONTINUED | OUTPATIENT
Start: 2022-03-15 | End: 2022-03-17 | Stop reason: HOSPADM

## 2022-03-15 RX ORDER — BISACODYL 10 MG
10 SUPPOSITORY, RECTAL RECTAL
Status: DISCONTINUED | OUTPATIENT
Start: 2022-03-15 | End: 2022-03-17 | Stop reason: HOSPADM

## 2022-03-15 RX ORDER — MORPHINE SULFATE 4 MG/ML
4 INJECTION INTRAVENOUS
Status: DISCONTINUED | OUTPATIENT
Start: 2022-03-15 | End: 2022-03-17 | Stop reason: HOSPADM

## 2022-03-15 RX ORDER — TRAZODONE HYDROCHLORIDE 50 MG/1
150 TABLET ORAL
Status: DISCONTINUED | OUTPATIENT
Start: 2022-03-15 | End: 2022-03-17 | Stop reason: HOSPADM

## 2022-03-15 RX ORDER — OXYCODONE HYDROCHLORIDE 10 MG/1
10 TABLET ORAL
Status: DISCONTINUED | OUTPATIENT
Start: 2022-03-15 | End: 2022-03-17 | Stop reason: HOSPADM

## 2022-03-15 RX ORDER — ONDANSETRON 2 MG/ML
4 INJECTION INTRAMUSCULAR; INTRAVENOUS EVERY 4 HOURS PRN
Status: DISCONTINUED | OUTPATIENT
Start: 2022-03-15 | End: 2022-03-17 | Stop reason: HOSPADM

## 2022-03-15 RX ORDER — MELOXICAM 7.5 MG/1
7.5 TABLET ORAL DAILY
Status: DISCONTINUED | OUTPATIENT
Start: 2022-03-16 | End: 2022-03-17 | Stop reason: HOSPADM

## 2022-03-15 RX ORDER — TRAZODONE HYDROCHLORIDE 50 MG/1
150 TABLET ORAL NIGHTLY
Status: ON HOLD | COMMUNITY
End: 2022-03-16 | Stop reason: SDUPTHER

## 2022-03-15 RX ORDER — AMOXICILLIN 250 MG
2 CAPSULE ORAL 2 TIMES DAILY
Status: DISCONTINUED | OUTPATIENT
Start: 2022-03-15 | End: 2022-03-17 | Stop reason: HOSPADM

## 2022-03-15 RX ORDER — SODIUM CHLORIDE 9 MG/ML
INJECTION, SOLUTION INTRAVENOUS CONTINUOUS
Status: DISCONTINUED | OUTPATIENT
Start: 2022-03-15 | End: 2022-03-17 | Stop reason: HOSPADM

## 2022-03-15 RX ORDER — LABETALOL HYDROCHLORIDE 5 MG/ML
10 INJECTION, SOLUTION INTRAVENOUS EVERY 4 HOURS PRN
Status: DISCONTINUED | OUTPATIENT
Start: 2022-03-15 | End: 2022-03-17 | Stop reason: HOSPADM

## 2022-03-15 RX ORDER — ACETAMINOPHEN 500 MG
500-1000 TABLET ORAL EVERY 6 HOURS PRN
Status: SHIPPED | COMMUNITY
End: 2022-08-15

## 2022-03-15 RX ORDER — PROMETHAZINE HYDROCHLORIDE 25 MG/1
12.5-25 TABLET ORAL EVERY 4 HOURS PRN
Status: DISCONTINUED | OUTPATIENT
Start: 2022-03-15 | End: 2022-03-17 | Stop reason: HOSPADM

## 2022-03-15 RX ORDER — MORPHINE SULFATE 4 MG/ML
4 INJECTION INTRAVENOUS ONCE
Status: COMPLETED | OUTPATIENT
Start: 2022-03-15 | End: 2022-03-15

## 2022-03-15 RX ORDER — PROCHLORPERAZINE EDISYLATE 5 MG/ML
5-10 INJECTION INTRAMUSCULAR; INTRAVENOUS EVERY 4 HOURS PRN
Status: DISCONTINUED | OUTPATIENT
Start: 2022-03-15 | End: 2022-03-17 | Stop reason: HOSPADM

## 2022-03-15 RX ORDER — LISINOPRIL 10 MG/1
10 TABLET ORAL EVERY MORNING
Status: DISCONTINUED | OUTPATIENT
Start: 2022-03-16 | End: 2022-03-17 | Stop reason: HOSPADM

## 2022-03-15 RX ORDER — GABAPENTIN 300 MG/1
300 CAPSULE ORAL 3 TIMES DAILY
Status: SHIPPED | COMMUNITY
End: 2022-05-17

## 2022-03-15 RX ORDER — HEPARIN SODIUM 5000 [USP'U]/ML
5000 INJECTION, SOLUTION INTRAVENOUS; SUBCUTANEOUS EVERY 8 HOURS
Status: DISCONTINUED | OUTPATIENT
Start: 2022-03-15 | End: 2022-03-17 | Stop reason: HOSPADM

## 2022-03-15 RX ORDER — POLYETHYLENE GLYCOL 3350 17 G/17G
1 POWDER, FOR SOLUTION ORAL
Status: DISCONTINUED | OUTPATIENT
Start: 2022-03-15 | End: 2022-03-17 | Stop reason: HOSPADM

## 2022-03-15 RX ORDER — ACETAMINOPHEN 325 MG/1
650 TABLET ORAL EVERY 6 HOURS PRN
Status: DISCONTINUED | OUTPATIENT
Start: 2022-03-15 | End: 2022-03-17 | Stop reason: HOSPADM

## 2022-03-15 RX ORDER — PROMETHAZINE HYDROCHLORIDE 25 MG/1
12.5-25 SUPPOSITORY RECTAL EVERY 4 HOURS PRN
Status: DISCONTINUED | OUTPATIENT
Start: 2022-03-15 | End: 2022-03-17 | Stop reason: HOSPADM

## 2022-03-15 RX ORDER — ONDANSETRON 4 MG/1
4 TABLET, ORALLY DISINTEGRATING ORAL EVERY 4 HOURS PRN
Status: DISCONTINUED | OUTPATIENT
Start: 2022-03-15 | End: 2022-03-17 | Stop reason: HOSPADM

## 2022-03-15 RX ADMIN — SODIUM CHLORIDE: 9 INJECTION, SOLUTION INTRAVENOUS at 19:06

## 2022-03-15 RX ADMIN — HEPARIN SODIUM 5000 UNITS: 5000 INJECTION, SOLUTION INTRAVENOUS; SUBCUTANEOUS at 21:24

## 2022-03-15 RX ADMIN — MORPHINE SULFATE 4 MG: 4 INJECTION INTRAVENOUS at 19:06

## 2022-03-15 RX ADMIN — DIVALPROEX SODIUM 1250 MG: 500 TABLET, EXTENDED RELEASE ORAL at 20:01

## 2022-03-15 RX ADMIN — CARBAMAZEPINE 400 MG: 400 TABLET, EXTENDED RELEASE ORAL at 19:53

## 2022-03-15 RX ADMIN — MORPHINE SULFATE 4 MG: 4 INJECTION INTRAVENOUS at 15:26

## 2022-03-15 RX ADMIN — TRAZODONE HYDROCHLORIDE 150 MG: 50 TABLET ORAL at 21:23

## 2022-03-15 ASSESSMENT — LIFESTYLE VARIABLES
EVER HAD A DRINK FIRST THING IN THE MORNING TO STEADY YOUR NERVES TO GET RID OF A HANGOVER: NO
EVER FELT BAD OR GUILTY ABOUT YOUR DRINKING: NO
HAVE PEOPLE ANNOYED YOU BY CRITICIZING YOUR DRINKING: NO
HOW MANY TIMES IN THE PAST YEAR HAVE YOU HAD 5 OR MORE DRINKS IN A DAY: 0
CONSUMPTION TOTAL: INCOMPLETE
AVERAGE NUMBER OF DAYS PER WEEK YOU HAVE A DRINK CONTAINING ALCOHOL: 0
TOTAL SCORE: 0
TOTAL SCORE: 0
ON A TYPICAL DAY WHEN YOU DRINK ALCOHOL HOW MANY DRINKS DO YOU HAVE: 0
TOTAL SCORE: 0
EVER HAD A DRINK FIRST THING IN THE MORNING TO STEADY YOUR NERVES TO GET RID OF A HANGOVER: NO
ALCOHOL_USE: NO
TOTAL SCORE: 0
HAVE YOU EVER FELT YOU SHOULD CUT DOWN ON YOUR DRINKING: NO
DO YOU DRINK ALCOHOL: NO
CONSUMPTION TOTAL: NEGATIVE
TOTAL SCORE: 0
HAVE YOU EVER FELT YOU SHOULD CUT DOWN ON YOUR DRINKING: NO
HAVE PEOPLE ANNOYED YOU BY CRITICIZING YOUR DRINKING: NO
EVER FELT BAD OR GUILTY ABOUT YOUR DRINKING: NO
TOTAL SCORE: 0

## 2022-03-15 ASSESSMENT — ENCOUNTER SYMPTOMS
PALPITATIONS: 0
DEPRESSION: 0
BACK PAIN: 0
BLURRED VISION: 0
SPUTUM PRODUCTION: 0
WHEEZING: 0
FEVER: 0
HEADACHES: 0
DIARRHEA: 0
SHORTNESS OF BREATH: 0
EYE REDNESS: 0
HEARTBURN: 0
BLOOD IN STOOL: 0
MYALGIAS: 0
ORTHOPNEA: 0
SINUS PAIN: 0
COUGH: 0
CHILLS: 0
SORE THROAT: 0
FALLS: 1
STRIDOR: 0
NAUSEA: 0
NECK PAIN: 0
DIZZINESS: 0
VOMITING: 0
EYE DISCHARGE: 0
ABDOMINAL PAIN: 0
INSOMNIA: 0
SEIZURES: 0
FOCAL WEAKNESS: 0
CLAUDICATION: 0
EYE PAIN: 0
WEIGHT LOSS: 0
CONSTIPATION: 0
NERVOUS/ANXIOUS: 0

## 2022-03-15 ASSESSMENT — PAIN DESCRIPTION - PAIN TYPE
TYPE: ACUTE PAIN
TYPE: ACUTE PAIN

## 2022-03-15 ASSESSMENT — FIBROSIS 4 INDEX
FIB4 SCORE: 1.55
FIB4 SCORE: 1.469936830518334116

## 2022-03-15 ASSESSMENT — PATIENT HEALTH QUESTIONNAIRE - PHQ9
1. LITTLE INTEREST OR PLEASURE IN DOING THINGS: NOT AT ALL
SUM OF ALL RESPONSES TO PHQ9 QUESTIONS 1 AND 2: 0
2. FEELING DOWN, DEPRESSED, IRRITABLE, OR HOPELESS: NOT AT ALL

## 2022-03-15 NOTE — ASSESSMENT & PLAN NOTE
-NPO  -Plan to have surgery today 3/16  -Ortho consulted and following  -Pain control  -PT/OT after surgery

## 2022-03-15 NOTE — ED PROVIDER NOTES
ED Provider Note    Scribed for Terrance Cardenas M.D. by Kj Zendejas. 3/15/2022, 2:56 PM.    Primary care provider: Ileana Araya P.A.-C.  Means of arrival: EMS  History obtained from: Patient  History limited by: None    CHIEF COMPLAINT  Chief Complaint   Patient presents with    Extremity Fracture       HPI  Lyssa Leblanc is a 41 y.o. female who presents to the Emergency Department via EMS for evaluation after a right leg injury sustained earlier this afternoon. Patient states she was at home when she tripped over a backpack in the doorway, causing her to fall over and injure her right leg. She was evaluated at urgent care where xrays showed tib/fib fracture. Patient has associated right leg pain. Denies hitting her head or losing consciousness. Denies sustaining any other injuries from the event today. Patient has prior history of stroke and has chronic contracture of the right hand.    PPE Note: I personally donned full PPE for all patient encounters during this visit, including being clean-shaven with an N95 respirator mask.     Scribe remained outside the patient's room and did not have any contact with the patient for the duration of patient encounter.      REVIEW OF SYSTEMS  Pertinent positives include right leg pain. Pertinent negatives include loss of consciousness. All other systems negative.    PAST MEDICAL HISTORY   has a past medical history of Anemia, iron deficiency (7/9/2015), Asthma, Seizure (HCC) (2019), and Stroke (HCC).    SURGICAL HISTORY   has a past surgical history that includes lap,diagnostic abdomen (N/A, 9/2/2020); tubal coagulation laparoscopic bilateral; and salpingectomy (Bilateral, 9/2/2020).    SOCIAL HISTORY  Social History     Tobacco Use    Smoking status: Former Smoker     Packs/day: 2.00     Types: Cigarettes    Smokeless tobacco: Current User     Types: Chew    Tobacco comment: 2 packs a day and vape   Vaping Use    Vaping Use: Every day    Substances:  "Flavoring    Devices: Pre-filled pod   Substance Use Topics    Alcohol use: No    Drug use: No      Social History     Substance and Sexual Activity   Drug Use No       FAMILY HISTORY  Family History   Problem Relation Age of Onset    Heart Disease Mother     Diabetes Mother     Hypertension Father     Diabetes Maternal Grandmother     Cancer Neg Hx     Hyperlipidemia Neg Hx        CURRENT MEDICATIONS  No current facility-administered medications on file prior to encounter.     Current Outpatient Medications on File Prior to Encounter   Medication Sig Dispense Refill    hydrOXYzine HCl (ATARAX) 50 MG Tab TAKE 1 TABLET BY MOUTH 2 TIMES A DAY AS NEEDED FOR ITCHING (FOR ANXIETY) 60 Tablet 1    lisinopril (PRINIVIL) 10 MG Tab TAKE 1 TABLET BY MOUTH EVERY DAY IN THE MORNING 30 Tablet 0    traZODone (DESYREL) 50 MG Tab Take 3 Tablets by mouth at bedtime as needed for Sleep (for insomnia). 90 Tablet 1    TEGRETOL  MG TABLET SR 12 HR TAKE 1 TABLET BY MOUTH TWICE A  Tablet 1    meloxicam (MOBIC) 7.5 MG Tab TAKE 1 TABLET BY MOUTH EVERY DAY 30 Tablet 0    divalproex ER (DEPAKOTE ER) 250 MG TABLET SR 24 HR Take 5 Tablets by mouth at bedtime for 180 days. Take 5 tablets at bedtime. 450 Tablet 1    Acetaminophen (TYLENOL 8 HOUR PO) Take  by mouth.          ALLERGIES  No Known Allergies    PHYSICAL EXAM  VITAL SIGNS: /57   Pulse 62   Temp 36.6 °C (97.8 °F) (Temporal)   Resp 16   Ht 1.702 m (5' 7\")   Wt 78.5 kg (173 lb)   SpO2 98%   BMI 27.10 kg/m²   Constitutional: Well developed, Well nourished, mild distress.   HENT: Normocephalic, Atraumatic, mask in place.  Eyes: Conjunctiva normal, No discharge.   Cardiovascular: Normal heart rate, Normal rhythm, No murmurs, equal pulses.   Pulmonary: Normal breath sounds, No respiratory distress, No wheezing, No rales, No rhonchi.   Abdomen:Soft, No tenderness, No masses, no rebound, no guarding.   Back: No CVA tenderness.   Musculoskeletal: Right leg with posterior " long splint. Tender over right tibia. Good capillary refill. Pelvis stable. Chronic contracture right hand.   Skin: Warm, Dry, No erythema, No rash.   Neurologic: Alert & oriented x 3, Normal motor function,  No focal deficits noted.   Psychiatric: Affect normal, Judgment normal, Mood normal.     LABS  Results for orders placed or performed during the hospital encounter of 03/15/22   COMP METABOLIC PANEL   Result Value Ref Range    Sodium 141 135 - 145 mmol/L    Potassium 3.5 (L) 3.6 - 5.5 mmol/L    Chloride 103 96 - 112 mmol/L    Co2 20 20 - 33 mmol/L    Anion Gap 18.0 (H) 7.0 - 16.0    Glucose 119 (H) 65 - 99 mg/dL    Bun 8 8 - 22 mg/dL    Creatinine 0.77 0.50 - 1.40 mg/dL    Calcium 10.3 8.5 - 10.5 mg/dL    AST(SGOT) 22 12 - 45 U/L    ALT(SGPT) 14 2 - 50 U/L    Alkaline Phosphatase 91 30 - 99 U/L    Total Bilirubin 0.5 0.1 - 1.5 mg/dL    Albumin 5.5 (H) 3.2 - 4.9 g/dL    Total Protein 8.6 (H) 6.0 - 8.2 g/dL    Globulin 3.1 1.9 - 3.5 g/dL    A-G Ratio 1.8 g/dL   ESTIMATED GFR   Result Value Ref Range    GFR (CKD-EPI) 99 >60 mL/min/1.73 m 2   CBC WITH DIFFERENTIAL   Result Value Ref Range    WBC 13.6 (H) 4.8 - 10.8 K/uL    RBC 3.84 (L) 4.20 - 5.40 M/uL    Hemoglobin 11.7 (L) 12.0 - 16.0 g/dL    Hematocrit 36.0 (L) 37.0 - 47.0 %    MCV 93.8 81.4 - 97.8 fL    MCH 30.5 27.0 - 33.0 pg    MCHC 32.5 (L) 33.6 - 35.0 g/dL    RDW 43.3 35.9 - 50.0 fL    Platelet Count 164 164 - 446 K/uL    MPV 13.3 (H) 9.0 - 12.9 fL    Neutrophils-Polys 85.50 (H) 44.00 - 72.00 %    Lymphocytes 9.10 (L) 22.00 - 41.00 %    Monocytes 4.60 0.00 - 13.40 %    Eosinophils 0.10 0.00 - 6.90 %    Basophils 0.30 0.00 - 1.80 %    Immature Granulocytes 0.40 0.00 - 0.90 %    Nucleated RBC 0.00 /100 WBC    Neutrophils (Absolute) 11.59 (H) 2.00 - 7.15 K/uL    Lymphs (Absolute) 1.24 1.00 - 4.80 K/uL    Monos (Absolute) 0.63 0.00 - 0.85 K/uL    Eos (Absolute) 0.01 0.00 - 0.51 K/uL    Baso (Absolute) 0.04 0.00 - 0.12 K/uL    Immature Granulocytes (abs) 0.06  0.00 - 0.11 K/uL    NRBC (Absolute) 0.00 K/uL   SARS-COV Antigen GEO   Result Value Ref Range    SARS-CoV-2 Source Nasal Swab     SARS-COV ANTIGEN GEO NotDetected NotDetected   \  All labs reviewed by me.    RADIOLOGY  [id number] W/O PLUS RECONS RIGHT   Final Result      1.  Comminuted, depressed tibial plateau fracture extending into the proximal/mid tibial shaft.      2.  Segmental, displaced proximal fibular fracture.           The radiologist's interpretation of all radiological studies have been reviewed by me.    COURSE & MEDICAL DECISION MAKING  Pertinent Labs & Imaging studies reviewed. (See chart for details)    2:56 PM - Patient seen and examined at bedside. Patient will be treated with morphine injection 4 mg. Ordered CBC with differential, CMP to evaluate her symptoms.     Discussed the case with Dr. Mallory orthopedics.  He recommended a CT be obtained to further assess the fractures and plan surgical fixation.  He asked that the hospitalist admit given her other comorbidities    3:54 PM - I discussed the patient's case and the above findings with Dr. Damon (hospitalist) who will admit the patient        Medical Decision Making: Patient presents with a fracture to her tibial plateau on fibula after a ground-level fall.  At this point time this appears to be quite extensive.  She will be admitted for pain control and surgical fixation.    DISPOSITION:  Patient will be hospitalized by Dr. Thomas in guarded condition.      FINAL IMPRESSION  1. Closed fracture of right tibia and fibula, initial encounter          Kj BURDICK (Abneribvanessa), am scribing for, and in the presence of, Terrance Cardenas M.D.    Electronically signed by: Kj Zendejas (Abneribvanessa), 3/15/2022    Terrance BURDICK M.D. personally performed the services described in this documentation, as scribed by Kj Zendejas in my presence, and it is both accurate and complete.    The note accurately reflects work and  decisions made by me.  Terrance Cardenas M.D.  3/15/2022  10:42 PM

## 2022-03-15 NOTE — H&P
Hospital Medicine History & Physical Note    Date of Service  3/15/2022    Primary Care Physician  Ileana Araya P.A.-C.    Consultants  orthopedics    Specialist Names:     Code Status  Full Code    Chief Complaint  Chief Complaint   Patient presents with   • Extremity Fracture       History of Presenting Illness  Lyssa Leblanc is a 41 y.o. female who presented 3/15/2022 with fall.   She accidentally tripped over a back pack and fell on her right ankle. After the fall, she has severe pain and noticed to have swelling in her ankle area.  She was brought to urgent care where she had imaging done and found to have right tib-fib fracture. Ortho was consulted and plan to take her to OR tomorrow.  NPO at midnight.    I discussed the plan of care with patient.    Review of Systems  Review of Systems   Constitutional: Negative for chills, fever and weight loss.   HENT: Negative for congestion, hearing loss, nosebleeds, sinus pain and sore throat.    Eyes: Negative for blurred vision, pain, discharge and redness.   Respiratory: Negative for cough, sputum production, shortness of breath, wheezing and stridor.    Cardiovascular: Negative for chest pain, palpitations, orthopnea and claudication.   Gastrointestinal: Negative for abdominal pain, blood in stool, constipation, diarrhea, heartburn, nausea and vomiting.   Genitourinary: Negative for dysuria, frequency, hematuria and urgency.   Musculoskeletal: Positive for falls and joint pain. Negative for back pain, myalgias and neck pain.   Skin: Negative for itching and rash.   Neurological: Negative for dizziness, focal weakness, seizures and headaches.   Psychiatric/Behavioral: Negative for depression. The patient is not nervous/anxious and does not have insomnia.        Past Medical History   has a past medical history of Anemia, iron deficiency (7/9/2015), Asthma, Seizure ([facility]) (2019), and Stroke ([facility]).    Surgical History   has a past surgical history that  includes pr lap,diagnostic abdomen (N/A, 9/2/2020); tubal coagulation laparoscopic bilateral; and salpingectomy (Bilateral, 9/2/2020).     Family History  family history includes Diabetes in her maternal grandmother and mother; Heart Disease in her mother; Hypertension in her father.       Social History   reports that she has quit smoking. Her smoking use included cigarettes. She smoked 2.00 packs per day. Her smokeless tobacco use includes chew. She reports that she does not drink alcohol and does not use drugs.    Allergies  No Known Allergies    Medications  Prior to Admission Medications   Prescriptions Last Dose Informant Patient Reported? Taking?   Acetaminophen (TYLENOL 8 HOUR PO)   Yes No   Sig: Take  by mouth.   TEGRETOL  MG TABLET SR 12 HR   No No   Sig: TAKE 1 TABLET BY MOUTH TWICE A DAY   divalproex ER (DEPAKOTE ER) 250 MG TABLET SR 24 HR   No No   Sig: Take 5 Tablets by mouth at bedtime for 180 days. Take 5 tablets at bedtime.   hydrOXYzine HCl (ATARAX) 50 MG Tab   No No   Sig: TAKE 1 TABLET BY MOUTH 2 TIMES A DAY AS NEEDED FOR ITCHING (FOR ANXIETY)   lisinopril (PRINIVIL) 10 MG Tab   No No   Sig: TAKE 1 TABLET BY MOUTH EVERY DAY IN THE MORNING   meloxicam (MOBIC) 7.5 MG Tab   No No   Sig: TAKE 1 TABLET BY MOUTH EVERY DAY   traZODone (DESYREL) 50 MG Tab   No No   Sig: Take 3 Tablets by mouth at bedtime as needed for Sleep (for insomnia).      Facility-Administered Medications: None       Physical Exam  Temp:  [36.6 °C (97.8 °F)] 36.6 °C (97.8 °F)  Pulse:  [62] 62  Resp:  [16] 16  BP: (122)/(57) 122/57  SpO2:  [98 %] 98 %  Blood Pressure: 122/57   Temperature: 36.6 °C (97.8 °F)   Pulse: 62   Respiration: 16   Pulse Oximetry: 98 %       Physical Exam  Vitals reviewed.   Constitutional:       General: She is not in acute distress.     Appearance: Normal appearance. She is normal weight. She is not ill-appearing, toxic-appearing or diaphoretic.   HENT:      Head: Normocephalic and atraumatic.       Right Ear: Tympanic membrane, ear canal and external ear normal.      Left Ear: Tympanic membrane, ear canal and external ear normal.      Nose: No congestion or rhinorrhea.   Eyes:      Extraocular Movements: Extraocular movements intact.      Conjunctiva/sclera: Conjunctivae normal.      Pupils: Pupils are equal, round, and reactive to light.   Cardiovascular:      Rate and Rhythm: Normal rate and regular rhythm.      Pulses: Normal pulses.      Heart sounds: Normal heart sounds. No murmur heard.    No friction rub. No gallop.   Pulmonary:      Effort: Pulmonary effort is normal. No respiratory distress.      Breath sounds: Normal breath sounds. No stridor. No wheezing or rhonchi.   Abdominal:      General: Bowel sounds are normal. There is no distension.      Palpations: Abdomen is soft. There is no mass.      Tenderness: There is no abdominal tenderness. There is no guarding or rebound.      Hernia: No hernia is present.   Musculoskeletal:         General: No swelling or tenderness.      Cervical back: Normal range of motion and neck supple.   Skin:     General: Skin is warm.      Findings: No erythema.   Neurological:      General: No focal deficit present.      Mental Status: She is alert and oriented to person, place, and time.         Laboratory:          No results for input(s): ALTSGPT, ASTSGOT, ALKPHOSPHAT, TBILIRUBIN, DBILIRUBIN, GAMMAGT, AMYLASE, LIPASE, ALB, PREALBUMIN, GLUCOSE in the last 72 hours.      No results for input(s): NTPROBNP in the last 72 hours.      No results for input(s): TROPONINT in the last 72 hours.    Imaging:  [id number] W/O PLUS RECONS RIGHT    (Results Pending)       X-Ray:  I have personally reviewed the images and compared with prior images.    Assessment/Plan:  I anticipate this patient is appropriate for observation status at this time.    * Ankle fracture, right, closed, initial encounter- (present on admission)  Assessment & Plan  NPO  Plan to have surgery  tomorrow  Ortho consulted  Pain control  PT/OT after surgery    Hypokalemia- (present on admission)  Assessment & Plan  Replete as needed  Follow cmp in am    Hypertension- (present on admission)  Assessment & Plan  Continue lisinopril  IV prn meds with parameter      VTE prophylaxis: heparin ppx

## 2022-03-15 NOTE — ED TRIAGE NOTES
[facility] took x-[facility] and found tib/fib fracture after pt fell and tripped over a back pack in her doorway.

## 2022-03-15 NOTE — PROGRESS NOTES
"   Orthopaedic PA Progress Note    Sent to ED from [facility] with comminuted displaced closed right proximal tib fib, Arrived 1545. CT and Covid pending. Plan for admssion to Medicine overnight (PMHx  CVA with R hemiplegia, seizures)   Father at bedside    ROS - Patient denies any new issues. No chest pain, dyspnea, or fever.  Pain well controlled.    /57   Pulse 62   Temp 36.6 °C (97.8 °F) (Temporal)   Resp 16   Ht 1.702 m (5' 7\")   Wt 78.5 kg (173 lb)   SpO2 98%     Patient seen and examined  No acute distress  Breathing non labored  RRR  Splint is clean, dry, and intact. STS with early bruising. No motor, none at baseline since birth. Sensation is intact to light touch throughout superficial peroneal, deep peroneal, tibial, saphenous, and sural nerve distributions. Strong and palpable 2+ dorsalis pedis and posterior tibial pulses with capillary refill less than 2 seconds. No lower leg tenderness or discomfort.    Assessment/Plan:  R leg fractures  Reccs:  Routine neuro checks for compartmental syndrome s/sx  STrict bedrest with elevation of leg above heart level  Ice to affected area  Covid Screen - rapid  NPO pending CT results - Called CT to prioritize  "

## 2022-03-16 ENCOUNTER — APPOINTMENT (OUTPATIENT)
Dept: RADIOLOGY | Facility: MEDICAL CENTER | Age: 42
End: 2022-03-16
Attending: ORTHOPAEDIC SURGERY
Payer: MEDICARE

## 2022-03-16 ENCOUNTER — ANESTHESIA EVENT (OUTPATIENT)
Dept: SURGERY | Facility: MEDICAL CENTER | Age: 42
End: 2022-03-16
Payer: MEDICARE

## 2022-03-16 ENCOUNTER — APPOINTMENT (OUTPATIENT)
Dept: RADIOLOGY | Facility: MEDICAL CENTER | Age: 42
End: 2022-03-16
Attending: NURSE PRACTITIONER
Payer: MEDICARE

## 2022-03-16 ENCOUNTER — ANESTHESIA (OUTPATIENT)
Dept: SURGERY | Facility: MEDICAL CENTER | Age: 42
End: 2022-03-16
Payer: MEDICARE

## 2022-03-16 LAB
ALBUMIN SERPL BCP-MCNC: 3.8 G/DL (ref 3.2–4.9)
ALBUMIN/GLOB SERPL: 1.8 G/DL
ALP SERPL-CCNC: 59 U/L (ref 30–99)
ALT SERPL-CCNC: 9 U/L (ref 2–50)
ANION GAP SERPL CALC-SCNC: 11 MMOL/L (ref 7–16)
AST SERPL-CCNC: 10 U/L (ref 12–45)
BILIRUB SERPL-MCNC: 0.4 MG/DL (ref 0.1–1.5)
BUN SERPL-MCNC: 8 MG/DL (ref 8–22)
CALCIUM SERPL-MCNC: 8.3 MG/DL (ref 8.5–10.5)
CHLORIDE SERPL-SCNC: 110 MMOL/L (ref 96–112)
CO2 SERPL-SCNC: 22 MMOL/L (ref 20–33)
CREAT SERPL-MCNC: 0.67 MG/DL (ref 0.5–1.4)
ERYTHROCYTE [DISTWIDTH] IN BLOOD BY AUTOMATED COUNT: 42.9 FL (ref 35.9–50)
GFR SERPLBLD CREATININE-BSD FMLA CKD-EPI: 112 ML/MIN/1.73 M 2
GLOBULIN SER CALC-MCNC: 2.1 G/DL (ref 1.9–3.5)
GLUCOSE SERPL-MCNC: 123 MG/DL (ref 65–99)
HCG SERPL QL: NEGATIVE
HCT VFR BLD AUTO: 28.8 % (ref 37–47)
HGB BLD-MCNC: 9.5 G/DL (ref 12–16)
MCH RBC QN AUTO: 30.6 PG (ref 27–33)
MCHC RBC AUTO-ENTMCNC: 33 G/DL (ref 33.6–35)
MCV RBC AUTO: 92.9 FL (ref 81.4–97.8)
PLATELET # BLD AUTO: 149 K/UL (ref 164–446)
PMV BLD AUTO: 13.6 FL (ref 9–12.9)
POTASSIUM SERPL-SCNC: 3.5 MMOL/L (ref 3.6–5.5)
PROT SERPL-MCNC: 5.9 G/DL (ref 6–8.2)
RBC # BLD AUTO: 3.1 M/UL (ref 4.2–5.4)
SODIUM SERPL-SCNC: 143 MMOL/L (ref 135–145)
WBC # BLD AUTO: 8.4 K/UL (ref 4.8–10.8)

## 2022-03-16 PROCEDURE — C1713 ANCHOR/SCREW BN/BN,TIS/BN: HCPCS | Performed by: ORTHOPAEDIC SURGERY

## 2022-03-16 PROCEDURE — G0378 HOSPITAL OBSERVATION PER HR: HCPCS

## 2022-03-16 PROCEDURE — 27535 TREAT KNEE FRACTURE: CPT | Mod: 80ROC | Performed by: STUDENT IN AN ORGANIZED HEALTH CARE EDUCATION/TRAINING PROGRAM

## 2022-03-16 PROCEDURE — 36415 COLL VENOUS BLD VENIPUNCTURE: CPT

## 2022-03-16 PROCEDURE — 700105 HCHG RX REV CODE 258: Performed by: ANESTHESIOLOGY

## 2022-03-16 PROCEDURE — 73590 X-RAY EXAM OF LOWER LEG: CPT | Mod: RT

## 2022-03-16 PROCEDURE — 160009 HCHG ANES TIME/MIN: Performed by: ORTHOPAEDIC SURGERY

## 2022-03-16 PROCEDURE — A9270 NON-COVERED ITEM OR SERVICE: HCPCS | Performed by: ANESTHESIOLOGY

## 2022-03-16 PROCEDURE — 502240 HCHG MISC OR SUPPLY RC 0272: Performed by: ORTHOPAEDIC SURGERY

## 2022-03-16 PROCEDURE — 160041 HCHG SURGERY MINUTES - EA ADDL 1 MIN LEVEL 4: Performed by: ORTHOPAEDIC SURGERY

## 2022-03-16 PROCEDURE — 500881 HCHG PACK, EXTREMITY: Performed by: ORTHOPAEDIC SURGERY

## 2022-03-16 PROCEDURE — 160036 HCHG PACU - EA ADDL 30 MINS PHASE I: Performed by: ORTHOPAEDIC SURGERY

## 2022-03-16 PROCEDURE — 700102 HCHG RX REV CODE 250 W/ 637 OVERRIDE(OP): Performed by: ANESTHESIOLOGY

## 2022-03-16 PROCEDURE — 27759 TREATMENT OF TIBIA FRACTURE: CPT | Mod: 80ROC,RT | Performed by: STUDENT IN AN ORGANIZED HEALTH CARE EDUCATION/TRAINING PROGRAM

## 2022-03-16 PROCEDURE — 160048 HCHG OR STATISTICAL LEVEL 1-5: Performed by: ORTHOPAEDIC SURGERY

## 2022-03-16 PROCEDURE — 96376 TX/PRO/DX INJ SAME DRUG ADON: CPT

## 2022-03-16 PROCEDURE — 85027 COMPLETE CBC AUTOMATED: CPT

## 2022-03-16 PROCEDURE — 160029 HCHG SURGERY MINUTES - 1ST 30 MINS LEVEL 4: Performed by: ORTHOPAEDIC SURGERY

## 2022-03-16 PROCEDURE — 700111 HCHG RX REV CODE 636 W/ 250 OVERRIDE (IP): Performed by: INTERNAL MEDICINE

## 2022-03-16 PROCEDURE — 500054 HCHG BANDAGE, ELASTIC 6: Performed by: ORTHOPAEDIC SURGERY

## 2022-03-16 PROCEDURE — 700111 HCHG RX REV CODE 636 W/ 250 OVERRIDE (IP): Performed by: ANESTHESIOLOGY

## 2022-03-16 PROCEDURE — 160035 HCHG PACU - 1ST 60 MINS PHASE I: Performed by: ORTHOPAEDIC SURGERY

## 2022-03-16 PROCEDURE — 27535 TREAT KNEE FRACTURE: CPT | Performed by: ORTHOPAEDIC SURGERY

## 2022-03-16 PROCEDURE — 700102 HCHG RX REV CODE 250 W/ 637 OVERRIDE(OP): Performed by: INTERNAL MEDICINE

## 2022-03-16 PROCEDURE — A9270 NON-COVERED ITEM OR SERVICE: HCPCS | Performed by: INTERNAL MEDICINE

## 2022-03-16 PROCEDURE — 502000 HCHG MISC OR IMPLANTS RC 0278: Performed by: ORTHOPAEDIC SURGERY

## 2022-03-16 PROCEDURE — 501838 HCHG SUTURE GENERAL: Performed by: ORTHOPAEDIC SURGERY

## 2022-03-16 PROCEDURE — 700101 HCHG RX REV CODE 250: Performed by: ANESTHESIOLOGY

## 2022-03-16 PROCEDURE — 700105 HCHG RX REV CODE 258: Performed by: INTERNAL MEDICINE

## 2022-03-16 PROCEDURE — 84703 CHORIONIC GONADOTROPIN ASSAY: CPT

## 2022-03-16 PROCEDURE — 99225 PR SUBSEQUENT OBSERVATION CARE,LEVEL II: CPT | Performed by: NURSE PRACTITIONER

## 2022-03-16 PROCEDURE — 27759 TREATMENT OF TIBIA FRACTURE: CPT | Mod: RT | Performed by: ORTHOPAEDIC SURGERY

## 2022-03-16 PROCEDURE — 160002 HCHG RECOVERY MINUTES (STAT): Performed by: ORTHOPAEDIC SURGERY

## 2022-03-16 PROCEDURE — 80053 COMPREHEN METABOLIC PANEL: CPT

## 2022-03-16 DEVICE — ADVANCED LOCKING SCREW 5X65MM: Type: IMPLANTABLE DEVICE | Site: TIBIA | Status: FUNCTIONAL

## 2022-03-16 DEVICE — ADVANCED LOCKING SCREW 5X40MM: Type: IMPLANTABLE DEVICE | Site: TIBIA | Status: FUNCTIONAL

## 2022-03-16 DEVICE — LOCKING SCREW DIA 5X37.5MM: Type: IMPLANTABLE DEVICE | Site: TIBIA | Status: FUNCTIONAL

## 2022-03-16 DEVICE — LOCKING SCREW DIA 5X42MM: Type: IMPLANTABLE DEVICE | Site: TIBIA | Status: FUNCTIONAL

## 2022-03-16 DEVICE — IMPLANTABLE DEVICE: Type: IMPLANTABLE DEVICE | Site: TIBIA | Status: FUNCTIONAL

## 2022-03-16 DEVICE — GRAFT BONE CANCELLOUS FREEZE DRIED CHIPS ALLOSOURCE 15CC (1EA): Type: IMPLANTABLE DEVICE | Site: TIBIA | Status: FUNCTIONAL

## 2022-03-16 RX ORDER — CELECOXIB 200 MG/1
200 CAPSULE ORAL ONCE
Status: COMPLETED | OUTPATIENT
Start: 2022-03-16 | End: 2022-03-16

## 2022-03-16 RX ORDER — HYDROMORPHONE HYDROCHLORIDE 1 MG/ML
0.4 INJECTION, SOLUTION INTRAMUSCULAR; INTRAVENOUS; SUBCUTANEOUS
Status: DISCONTINUED | OUTPATIENT
Start: 2022-03-16 | End: 2022-03-16 | Stop reason: HOSPADM

## 2022-03-16 RX ORDER — HYDROXYZINE 50 MG/1
50 TABLET, FILM COATED ORAL 2 TIMES DAILY
Qty: 30 TABLET | Refills: 3 | Status: SHIPPED | OUTPATIENT
Start: 2022-03-16 | End: 2022-04-20 | Stop reason: SDUPTHER

## 2022-03-16 RX ORDER — CEFAZOLIN SODIUM 1 G/3ML
INJECTION, POWDER, FOR SOLUTION INTRAMUSCULAR; INTRAVENOUS PRN
Status: DISCONTINUED | OUTPATIENT
Start: 2022-03-16 | End: 2022-03-16 | Stop reason: SURG

## 2022-03-16 RX ORDER — HYDRALAZINE HYDROCHLORIDE 20 MG/ML
5 INJECTION INTRAMUSCULAR; INTRAVENOUS
Status: DISCONTINUED | OUTPATIENT
Start: 2022-03-16 | End: 2022-03-16 | Stop reason: HOSPADM

## 2022-03-16 RX ORDER — LABETALOL HYDROCHLORIDE 5 MG/ML
5 INJECTION, SOLUTION INTRAVENOUS
Status: DISCONTINUED | OUTPATIENT
Start: 2022-03-16 | End: 2022-03-16 | Stop reason: HOSPADM

## 2022-03-16 RX ORDER — TRAZODONE HYDROCHLORIDE 50 MG/1
150 TABLET ORAL NIGHTLY
Qty: 30 TABLET | Refills: 3 | Status: SHIPPED | OUTPATIENT
Start: 2022-03-16 | End: 2022-04-20 | Stop reason: SDUPTHER

## 2022-03-16 RX ORDER — ONDANSETRON 2 MG/ML
INJECTION INTRAMUSCULAR; INTRAVENOUS PRN
Status: DISCONTINUED | OUTPATIENT
Start: 2022-03-16 | End: 2022-03-16 | Stop reason: SURG

## 2022-03-16 RX ORDER — PROPOFOL 10 MG/ML
INJECTION, EMULSION INTRAVENOUS PRN
Status: DISCONTINUED | OUTPATIENT
Start: 2022-03-16 | End: 2022-03-16 | Stop reason: SURG

## 2022-03-16 RX ORDER — SODIUM CHLORIDE, SODIUM LACTATE, POTASSIUM CHLORIDE, CALCIUM CHLORIDE 600; 310; 30; 20 MG/100ML; MG/100ML; MG/100ML; MG/100ML
INJECTION, SOLUTION INTRAVENOUS CONTINUOUS
Status: DISCONTINUED | OUTPATIENT
Start: 2022-03-16 | End: 2022-03-16 | Stop reason: HOSPADM

## 2022-03-16 RX ORDER — DIPHENHYDRAMINE HYDROCHLORIDE 50 MG/ML
12.5 INJECTION INTRAMUSCULAR; INTRAVENOUS
Status: DISCONTINUED | OUTPATIENT
Start: 2022-03-16 | End: 2022-03-16 | Stop reason: HOSPADM

## 2022-03-16 RX ORDER — SODIUM CHLORIDE, SODIUM LACTATE, POTASSIUM CHLORIDE, CALCIUM CHLORIDE 600; 310; 30; 20 MG/100ML; MG/100ML; MG/100ML; MG/100ML
INJECTION, SOLUTION INTRAVENOUS
Status: DISCONTINUED | OUTPATIENT
Start: 2022-03-16 | End: 2022-03-16 | Stop reason: SURG

## 2022-03-16 RX ORDER — ONDANSETRON 2 MG/ML
4 INJECTION INTRAMUSCULAR; INTRAVENOUS
Status: DISCONTINUED | OUTPATIENT
Start: 2022-03-16 | End: 2022-03-16 | Stop reason: HOSPADM

## 2022-03-16 RX ORDER — HYDROMORPHONE HYDROCHLORIDE 2 MG/ML
INJECTION, SOLUTION INTRAMUSCULAR; INTRAVENOUS; SUBCUTANEOUS PRN
Status: DISCONTINUED | OUTPATIENT
Start: 2022-03-16 | End: 2022-03-16 | Stop reason: SURG

## 2022-03-16 RX ORDER — HYDROMORPHONE HYDROCHLORIDE 1 MG/ML
0.1 INJECTION, SOLUTION INTRAMUSCULAR; INTRAVENOUS; SUBCUTANEOUS
Status: DISCONTINUED | OUTPATIENT
Start: 2022-03-16 | End: 2022-03-16 | Stop reason: HOSPADM

## 2022-03-16 RX ORDER — HYDROMORPHONE HYDROCHLORIDE 1 MG/ML
0.2 INJECTION, SOLUTION INTRAMUSCULAR; INTRAVENOUS; SUBCUTANEOUS
Status: DISCONTINUED | OUTPATIENT
Start: 2022-03-16 | End: 2022-03-16 | Stop reason: HOSPADM

## 2022-03-16 RX ORDER — OXYCODONE HCL 5 MG/5 ML
10 SOLUTION, ORAL ORAL
Status: COMPLETED | OUTPATIENT
Start: 2022-03-16 | End: 2022-03-16

## 2022-03-16 RX ORDER — DEXAMETHASONE SODIUM PHOSPHATE 4 MG/ML
INJECTION, SOLUTION INTRA-ARTICULAR; INTRALESIONAL; INTRAMUSCULAR; INTRAVENOUS; SOFT TISSUE PRN
Status: DISCONTINUED | OUTPATIENT
Start: 2022-03-16 | End: 2022-03-16 | Stop reason: SURG

## 2022-03-16 RX ORDER — ACETAMINOPHEN 500 MG
1000 TABLET ORAL ONCE
Status: COMPLETED | OUTPATIENT
Start: 2022-03-16 | End: 2022-03-16

## 2022-03-16 RX ORDER — DEXMEDETOMIDINE HYDROCHLORIDE 100 UG/ML
INJECTION, SOLUTION INTRAVENOUS PRN
Status: DISCONTINUED | OUTPATIENT
Start: 2022-03-16 | End: 2022-03-16 | Stop reason: SURG

## 2022-03-16 RX ORDER — LIDOCAINE HYDROCHLORIDE 20 MG/ML
INJECTION, SOLUTION EPIDURAL; INFILTRATION; INTRACAUDAL; PERINEURAL PRN
Status: DISCONTINUED | OUTPATIENT
Start: 2022-03-16 | End: 2022-03-16 | Stop reason: SURG

## 2022-03-16 RX ORDER — HALOPERIDOL 5 MG/ML
1 INJECTION INTRAMUSCULAR
Status: DISCONTINUED | OUTPATIENT
Start: 2022-03-16 | End: 2022-03-16 | Stop reason: HOSPADM

## 2022-03-16 RX ORDER — OXYCODONE HCL 5 MG/5 ML
5 SOLUTION, ORAL ORAL
Status: COMPLETED | OUTPATIENT
Start: 2022-03-16 | End: 2022-03-16

## 2022-03-16 RX ORDER — MEPERIDINE HYDROCHLORIDE 25 MG/ML
6.25 INJECTION INTRAMUSCULAR; INTRAVENOUS; SUBCUTANEOUS
Status: DISCONTINUED | OUTPATIENT
Start: 2022-03-16 | End: 2022-03-16 | Stop reason: HOSPADM

## 2022-03-16 RX ADMIN — PROPOFOL 200 MG: 10 INJECTION, EMULSION INTRAVENOUS at 14:50

## 2022-03-16 RX ADMIN — DIVALPROEX SODIUM 1250 MG: 500 TABLET, EXTENDED RELEASE ORAL at 20:38

## 2022-03-16 RX ADMIN — SENNOSIDES AND DOCUSATE SODIUM 2 TABLET: 50; 8.6 TABLET ORAL at 18:25

## 2022-03-16 RX ADMIN — CARBAMAZEPINE 400 MG: 400 TABLET, EXTENDED RELEASE ORAL at 05:25

## 2022-03-16 RX ADMIN — DEXMEDETOMIDINE 15 MCG: 200 INJECTION, SOLUTION INTRAVENOUS at 16:20

## 2022-03-16 RX ADMIN — HYDROMORPHONE HYDROCHLORIDE 0.5 MG: 2 INJECTION INTRAMUSCULAR; INTRAVENOUS; SUBCUTANEOUS at 16:17

## 2022-03-16 RX ADMIN — ONDANSETRON 4 MG: 2 INJECTION INTRAMUSCULAR; INTRAVENOUS at 16:35

## 2022-03-16 RX ADMIN — ACETAMINOPHEN 650 MG: 325 TABLET, FILM COATED ORAL at 08:45

## 2022-03-16 RX ADMIN — CEFAZOLIN 2 G: 330 INJECTION, POWDER, FOR SOLUTION INTRAMUSCULAR; INTRAVENOUS at 14:52

## 2022-03-16 RX ADMIN — HYDROMORPHONE HYDROCHLORIDE 0.5 MG: 2 INJECTION INTRAMUSCULAR; INTRAVENOUS; SUBCUTANEOUS at 14:59

## 2022-03-16 RX ADMIN — SODIUM CHLORIDE, POTASSIUM CHLORIDE, SODIUM LACTATE AND CALCIUM CHLORIDE: 600; 310; 30; 20 INJECTION, SOLUTION INTRAVENOUS at 14:44

## 2022-03-16 RX ADMIN — HYDROMORPHONE HYDROCHLORIDE 0.5 MG: 2 INJECTION INTRAMUSCULAR; INTRAVENOUS; SUBCUTANEOUS at 14:50

## 2022-03-16 RX ADMIN — CARBAMAZEPINE 400 MG: 400 TABLET, EXTENDED RELEASE ORAL at 18:25

## 2022-03-16 RX ADMIN — CELECOXIB 200 MG: 200 CAPSULE ORAL at 12:46

## 2022-03-16 RX ADMIN — LIDOCAINE HYDROCHLORIDE 50 MG: 20 INJECTION, SOLUTION EPIDURAL; INFILTRATION; INTRACAUDAL at 14:50

## 2022-03-16 RX ADMIN — SODIUM CHLORIDE: 9 INJECTION, SOLUTION INTRAVENOUS at 08:46

## 2022-03-16 RX ADMIN — MORPHINE SULFATE 4 MG: 4 INJECTION INTRAVENOUS at 00:47

## 2022-03-16 RX ADMIN — OXYCODONE HYDROCHLORIDE 10 MG: 10 TABLET ORAL at 08:45

## 2022-03-16 RX ADMIN — OXYCODONE HYDROCHLORIDE 10 MG: 5 SOLUTION ORAL at 17:33

## 2022-03-16 RX ADMIN — FENTANYL CITRATE 50 MCG: 50 INJECTION, SOLUTION INTRAMUSCULAR; INTRAVENOUS at 14:44

## 2022-03-16 RX ADMIN — ACETAMINOPHEN 1000 MG: 500 TABLET ORAL at 12:46

## 2022-03-16 RX ADMIN — MELOXICAM 7.5 MG: 7.5 TABLET ORAL at 05:25

## 2022-03-16 RX ADMIN — FENTANYL CITRATE 50 MCG: 50 INJECTION, SOLUTION INTRAMUSCULAR; INTRAVENOUS at 16:17

## 2022-03-16 RX ADMIN — EPHEDRINE SULFATE 10 MG: 50 INJECTION, SOLUTION INTRAVENOUS at 15:33

## 2022-03-16 RX ADMIN — DEXAMETHASONE SODIUM PHOSPHATE 8 MG: 4 INJECTION, SOLUTION INTRA-ARTICULAR; INTRALESIONAL; INTRAMUSCULAR; INTRAVENOUS; SOFT TISSUE at 14:52

## 2022-03-16 ASSESSMENT — ENCOUNTER SYMPTOMS
GASTROINTESTINAL NEGATIVE: 1
FALLS: 1
RESPIRATORY NEGATIVE: 1
EYES NEGATIVE: 1
PSYCHIATRIC NEGATIVE: 1
CARDIOVASCULAR NEGATIVE: 1
CHILLS: 0
FEVER: 0

## 2022-03-16 ASSESSMENT — PAIN DESCRIPTION - PAIN TYPE
TYPE: ACUTE PAIN
TYPE: SURGICAL PAIN
TYPE: ACUTE PAIN
TYPE: SURGICAL PAIN
TYPE: ACUTE PAIN

## 2022-03-16 NOTE — CARE PLAN
Problem: Pain - Standard  Goal: Alleviation of pain or a reduction in pain to the patient’s comfort goal  Outcome: Progressing     Problem: Knowledge Deficit - Standard  Goal: Patient and family/care givers will demonstrate understanding of plan of care, disease process/condition, diagnostic tests and medications  Outcome: Progressing     Problem: Skin Integrity  Goal: Skin integrity is maintained or improved  Outcome: Progressing     Problem: Fall Risk  Goal: Patient will remain free from falls  Outcome: Progressing   The patient is Stable - Low risk of patient condition declining or worsening         Progress made toward(s) clinical / shift goals:  Patient updated on POC    Patient is not progressing towards the following goals:

## 2022-03-16 NOTE — PROGRESS NOTES
CT reviewed with Dr. Mohamud COLEMAN for diet  NPO p MN  OR tomorrow for surgical treatment prox R tib fib  Formal consult to follow  Neuro checks per unit protocol - hi risk for compartment syndromes

## 2022-03-16 NOTE — HOSPITAL COURSE
Ms. Lyssa Leblanc is a 41-year-old female with PMHx of infant stroke with right hemiplegia,depression, PTSD, learning disability, seizure disorder, insomnia who presented on 3/15/2022 due to right leg pain.    Patient reports she excellently tripped over a backpack and fell backwards on her right ankle.  Even though she does have right-sided hemiplegia, patient is able to use her right leg for weightbearing and mobilizing, but mostly drags her leg.  After her fall, patient experienced severe right leg pain and noticed to have swelling in her ankle area.  She was brought to urgent care where imaging was obtained and found patient to have a right tib-fib fracture.  Orthopedics was consulted, and patient was sent to the ER for surgical intervention.    Patient was seen in the ER by orthopedics and a splint to her right leg was placed.  In ER, notable findings noted /57, pulse 62, RR 16, temp 30 6.6C, O2 saturation 98% on room air.  Strict bedrest with elevation of right leg, ice to affected area and Covid screening obtained.  N.p.o. at midnight for anticipated right leg surgical intervention.  Patient admitted into the observation unit for management of care.

## 2022-03-16 NOTE — WOUND TEAM
[facility]  Inpatient Services  Skin Care Brief Rounding Evaluation    Admission Date: 3/15/2022     No order of IP CONSULT TO WOUND CARE is found.     HPI, PMH, SH: Reviewed    Chief Complaint   Patient presents with   • Extremity Fracture     Diagnosis: Ankle fracture, right, closed, initial encounter [S89.181O]    Unit where seen by Wound Team: T207    Wound team assessed during unit monthly rounding. Chart and images reviewed. Saji 15. RN prevention protocol open (No). This RN in to assess patient. No areas of concern.  Preventative measures in ordered as follows below. No further follow up unless indicated and consulted.         RSKIN:   CURRENTLY IN PLACE (X), APPLIED THIS VISIT (A), ORDERED (O):   Q shift Saji:  X  Q shift pressure point assessments:  X    Surface/Positioning   Pressure redistribution mattress     x       Low Airloss          Bariatric foam      Bariatric TOMMY     Waffle cushion        Waffle Overlay          Reposition q 2 hours    X turns self  TAPs Turning system     Z Armando Pillow     Offloading/Redistribution   Sacral Mepilex (Silicone dressing)     Heel Mepilex (Silicone dressing) x to impaired limb        Heel float boots (Prevalon boot)             Float Heels off Bed with Pillows           Respiratory NA  Silicone O2 tubing         Gray Foam Ear protectors     Cannula fixation Device (Tender )          High flow offloading Clip    Elastic head band offloading device      Anchorfast                                                         Trach with Optifoam split foam             Containment/Moisture Prevention     Rectal tube or BMS    Purwick/Condom Cath      x  Wagner Catheter    Barrier wipes           Barrier paste       Antifungal tx      Interdry        Mobilization NA pending surgery      Up to chair        Ambulate      PT/OT      Nutrition       Dietician        Diabetes Education      PO     TF     TPN     NPOx   # days

## 2022-03-16 NOTE — ANESTHESIA PREPROCEDURE EVALUATION
Case: [id number] Date/Time: 03/16/22 1344    Procedure: INSERTION, INTRAMEDULLARY PRINCESS, TIBIA (Right )    Location: [facility] OR 16 / SURGERY [facility]    Surgeons: Ryan Mallory M.D.          Relevant Problems   ANESTHESIA (within normal limits)      PULMONARY   (positive) Asthma      NEURO   (positive) History of stroke   (positive) Seizure disorder (HCC)      CARDIAC   (positive) Hypertension      GI (within normal limits)       (within normal limits)      ENDO (within normal limits)      Other   (positive) Ankle fracture, right, closed, initial encounter   (positive) Anxiety   (positive) Depression, major   (positive) Foot drop, right   (positive) Hypokalemia   (positive) Obesity   (positive) Post traumatic stress disorder   (positive) Right spastic hemiplegia (HCC)   h/o infantile stroke    Physical Exam    Airway   Mallampati: II  TM distance: >3 FB  Neck ROM: full       Cardiovascular - normal exam  Rhythm: regular  Rate: normal  (-) murmur     Dental - normal exam           Pulmonary - normal exam  Breath sounds clear to auscultation     Abdominal    Neurological - normal exam                 Anesthesia Plan    ASA 3   ASA physical status 3 criteria: CVA or TIA - history (> 3 months)    Plan - general       Airway plan will be LMA          Induction: intravenous    Postoperative Plan: Postoperative administration of opioids is intended.    Pertinent diagnostic labs and testing reviewed    Informed Consent:    Anesthetic plan and risks discussed with patient.    Use of blood products discussed with: patient whom consented to blood products.

## 2022-03-16 NOTE — PROGRESS NOTES
Assessment completed. Pt A&Ox 4. Respirations are even and unlabored on room air. Pt reports pain at this time. Medical patient , VS stable, call light and belongings within reach. POC updated (Surgery Right tib-fib fracture). Pt educated on room and call light, pt verbalized understanding. Communication board updated. Needs met.

## 2022-03-16 NOTE — PROGRESS NOTES
[facility] Daily Progress Note    Date of Service  3/16/2022    Chief Complaint  Lyssa Leblanc is a 41 y.o. female admitted 3/15/2022 with RIGHT leg pain    Hospital Course  Ms. Lyssa Leblanc is a 41-year-old female with PMHx of infant stroke with right hemiplegia,depression, PTSD, learning disability, seizure disorder, insomnia who presented on 3/15/2022 due to right leg pain.    Patient reports she excellently tripped over a backpack and fell backwards on her right ankle.  Even though she does have right-sided hemiplegia, patient is able to use her right leg for weightbearing and mobilizing, but mostly drags her leg.  After her fall, patient experienced severe right leg pain and noticed to have swelling in her ankle area.  She was brought to urgent care where imaging was obtained and found patient to have a right tib-fib fracture.  Orthopedics was consulted, and patient was sent to the ER for surgical intervention.    Patient was seen in the ER by orthopedics and a splint to her right leg was placed.  In ER, notable findings noted /57, pulse 62, RR 16, temp 30 6.6C, O2 saturation 98% on room air.  Strict bedrest with elevation of right leg, ice to affected area and Covid screening obtained.  N.p.o. at midnight for anticipated right leg surgical intervention.  Patient admitted into the observation unit for management of care.        Interval Problem Update  -Patient seen and examined.  Noted right leg splint.  Patient reports severe pain over right leg when touched.  Discussed with patient anticipated right leg surgical intervention and expectation.  Also discussed plan of care with patient's father, Jose over the phone.  Patient and family updated in plan of care.  -Plan of care: Keep patient n.p.o. until surgical intervention today at 230pm; pain management as needed; patient can resume diet after surgical intervention; patient will need PT/OT for evaluation prior to discharge  -Disposition:  Anticipated to stay overnight until surgical intervention; patient will need PT/OT evaluation prior to discharge  -Lab work: Reviewed; expected  -VSS at this time      I have personally seen and examined the patient at bedside. I discussed the plan of care with patient, bedside RN and .    Consultants/Specialty  orthopedics    Code Status  Full Code    Disposition  Patient is not medically cleared for discharge.   Anticipate discharge to to home with close outpatient follow-up.  I have placed the appropriate orders for post-discharge needs.    Review of Systems  Review of Systems   Constitutional: Positive for malaise/fatigue. Negative for chills and fever.   HENT: Negative.    Eyes: Negative.    Respiratory: Negative.    Cardiovascular: Negative.    Gastrointestinal: Negative.    Genitourinary: Negative.    Musculoskeletal: Positive for falls and joint pain.   Skin: Negative.    Endo/Heme/Allergies: Negative.    Psychiatric/Behavioral: Negative.         Physical Exam  Temp:  [36.2 °C (97.2 °F)-37.6 °C (99.6 °F)] 37.6 °C (99.6 °F)  Pulse:  [55-82] 75  Resp:  [16-24] 18  BP: ()/(52-83) 92/52  SpO2:  [93 %-100 %] 94 %    Physical Exam  Vitals and nursing note reviewed.   Constitutional:       Appearance: She is obese.   HENT:      Nose: Nose normal.      Mouth/Throat:      Mouth: Mucous membranes are moist.      Pharynx: Oropharynx is clear.   Eyes:      Pupils: Pupils are equal, round, and reactive to light.   Cardiovascular:      Rate and Rhythm: Normal rate and regular rhythm.      Pulses: Normal pulses.      Heart sounds: Normal heart sounds.   Pulmonary:      Breath sounds: Normal breath sounds.   Abdominal:      General: Bowel sounds are normal.      Palpations: Abdomen is soft.   Musculoskeletal:         General: Tenderness and signs of injury present.      Cervical back: Normal range of motion and neck supple.      Right lower leg: Edema present.   Skin:     General: Skin is dry.       Capillary Refill: Capillary refill takes 2 to 3 seconds.   Neurological:      Mental Status: She is alert. Mental status is at baseline.         Fluids  No intake or output data in the 24 hours ending 03/16/22 0937    Laboratory  Recent Labs     03/15/22  1655 03/16/22  0331   WBC 13.6* 8.4   RBC 3.84* 3.10*   HEMOGLOBIN 11.7* 9.5*   HEMATOCRIT 36.0* 28.8*   MCV 93.8 92.9   MCH 30.5 30.6   MCHC 32.5* 33.0*   RDW 43.3 42.9   PLATELETCT 164 149*   MPV 13.3* 13.6*     Recent Labs     03/15/22  1510 03/16/22  0331   SODIUM 141 143   POTASSIUM 3.5* 3.5*   CHLORIDE 103 110   CO2 20 22   GLUCOSE 119* 123*   BUN 8 8   CREATININE 0.77 0.67   CALCIUM 10.3 8.3*                   Imaging  [id number] W/O PLUS RECONS RIGHT   Final Result      1.  Comminuted, depressed tibial plateau fracture extending into the proximal/mid tibial shaft.      2.  Segmental, displaced proximal fibular fracture.         DX-PORTABLE FLUORO > 1 HOUR    (Results Pending)   DX-TIBIA AND FIBULA RIGHT    (Results Pending)        Assessment/Plan  * Ankle fracture, right, closed, initial encounter- (present on admission)  Assessment & Plan  NPO  Plan to have surgery tomorrow  Ortho consulted  Pain control  PT/OT after surgery    Hypokalemia- (present on admission)  Assessment & Plan  Replete as needed  Follow cmp in am    Hypertension- (present on admission)  Assessment & Plan  Continue lisinopril  IV prn meds with parameter         VTE prophylaxis: heparin ppx    I have performed a physical exam and reviewed and updated ROS and Plan today (3/16/2022). In review of yesterday's note (3/15/2022), there are no changes except as documented above.    ============================================================================================================  Please note that this dictation was created using voice recognition software. I have made every reasonable attempt to correct obvious errors, but there may be errors of grammar and possibly content that  I did not discover before finalizing the note.    Electronically signed by:  BENJAMIN Haro, MSN, APRN, FNP-C  Hospitalist Services  [facility]  [phone]  [email or web address]  03/16/22                 [id number]

## 2022-03-16 NOTE — THERAPY
Missed Therapy     Patient Name: Lyssa Leblanc  Age:  41 y.o., Sex:  female  Medical Record #: [id number]  Today's Date: 3/16/2022    Discussed missed therapy with Shahnaz       03/16/22 1255   Interdisciplinary Plan of Care Collaboration   Collaboration Comments OT eval attempted, pt in sx for ORIF right Tib/fib fx.  Will hold OT eval until post op

## 2022-03-16 NOTE — CARE PLAN
The patient is Stable - Low risk of patient condition declining or worsening    Shift Goals  Clinical Goals: Pain control ,SX in AM  Patient Goals: Sx in AM  Family Goals: comfort, DC    Progress made toward(s) clinical / shift goals: SX 3/16 at 1422    Patient is not progressing towards the following goals: pain management     Problem: Pain - Standard  Goal: Alleviation of pain or a reduction in pain to the patient’s comfort goal  Outcome: Progressing  Note: PRN pain medication given for pain management, see MAR. RN will continue to assess pain throughout the shift.      Problem: Skin Integrity  Goal: Skin integrity is maintained or improved  Outcome: Progressing  Note: Pt turns self side to side, purewick in place, clean and change pt PRN. Cast to RLE, no skin breakdown noted.

## 2022-03-16 NOTE — ED NOTES
RN and family and pt discussed admit details and plan all questions answered. Pt taken upstairs with all belongings to room

## 2022-03-16 NOTE — PROGRESS NOTES
Patient care assumed. Report received from Malachi MONTANEZ    Assessment completed. Patient family currently at bedside. Pt A&Ox 4. Respirations are even and unlabored on RA. Pt denies pain at this time. RLE assessed, patient able to move toes, denies numbness and tingling. VS stable, call light and belongings within reach. POC updated (Pain control, SX in AM). Pt educated on room and call light, pt verbalized understanding. Communication board updated. Needs met.

## 2022-03-16 NOTE — CONSULTS
Date of Service: 03/15/22    Lyssa Leblanc was seen today in consultation for right proximal tibia fracture at the request of Dr. Damon    CC: Right proximal tibia fracture    HPI: Lyssa Leblanc is a 41 y.o. female who presents with complaints of pain to right knee.  This started earlier today after a trip and fall.  She was walking in her house when she tried to step over a backpack and stepped on it and lost her balance falling to the ground.  She was seen in [facility] following this injury where x-rays showed her to have a comminuted proximal tibia fracture.  This was splinted and reduced and sent to the ER for further management.  The pain is 6/10 and is described as sharp.  The pain is made worse by palpation of the area and made better by rest and immobilization.  She has a history of a stroke as an infant and resulting hemiplegia that affects the right side.  She does use the right leg though for weightbearing and mobilizing.  She mostly drags the right leg she states them.    PMH:   Past Medical History:   Diagnosis Date   • Anemia, iron deficiency 7/9/2015   • Asthma    • Seizure (HCC) 2019   • Stroke (HCC)     as an infant       PSH:   Past Surgical History:   Procedure Laterality Date   • ME LAP,DIAGNOSTIC ABDOMEN N/A 9/2/2020    Procedure: PELVISCOPY;  Surgeon: Garrett Osorio M.D.;  Location: SURGERY [facility];  Service: Gynecology   • SALPINGECTOMY Bilateral 9/2/2020    Procedure: SALPINGECTOMY;  Surgeon: Garrett Osorio M.D.;  Location: SURGERY [facility];  Service: Gynecology   • TUBAL COAGULATION LAPAROSCOPIC BILATERAL         FH:   Family History   Problem Relation Age of Onset   • Heart Disease Mother    • Diabetes Mother    • Hypertension Father    • Diabetes Maternal Grandmother    • Cancer Neg Hx    • Hyperlipidemia Neg Hx        SH:   Social History     Socioeconomic History   • Marital status:      Spouse name: Not on file   • Number of children: Not on file   •  Years of education: Not on file   • Highest education level: 12th grade   Occupational History   • Not on file   Tobacco Use   • Smoking status: Former Smoker     Packs/day: 2.00     Types: Cigarettes   • Smokeless tobacco: Current User     Types: Chew   • Tobacco comment: 2 packs a day and vape   Vaping Use   • Vaping Use: Every day   • Substances: Flavoring   • Devices: Pre-filled pod   Substance and Sexual Activity   • Alcohol use: No   • Drug use: No   • Sexual activity: Not on file   Other Topics Concern   • Not on file   Social History Narrative   • Not on file     Social Determinants of Health     Financial Resource Strain: Unknown   • Difficulty of Paying Living Expenses: Patient refused   Food Insecurity: Unknown   • Worried About Running Out of Food in the Last Year: Patient refused   • Ran Out of Food in the Last Year: Patient refused   Transportation Needs: No Transportation Needs   • Lack of Transportation (Medical): No   • Lack of Transportation (Non-Medical): No   Physical Activity: Unknown   • Days of Exercise per Week: Not on file   • Minutes of Exercise per Session: 10 min   Stress: Unknown   • Feeling of Stress : Patient refused   Social Connections: Unknown   • Frequency of Communication with Friends and Family: Patient refused   • Frequency of Social Gatherings with Friends and Family: Patient refused   • Attends Confucianist Services: Patient refused   • Active Member of Clubs or Organizations: No   • Attends Club or Organization Meetings: Patient refused   • Marital Status: Not on file   Intimate Partner Violence: Not on file   Housing Stability: Unknown   • Unable to Pay for Housing in the Last Year: Patient refused   • Number of Places Lived in the Last Year: 0   • Unstable Housing in the Last Year: No       ROS: In review of the following systems: Constitutional, Eyes, ENT, Cardiovascular,Respiratory, GI, , Musculoskeletal, Skin, Neuro, Psych, Hematologic, Endocrine, Allergic; no pertinent  "findings were found related to the chief complaint and orthopedic injury     BP (!) 92/52   Pulse 75   Temp 37.6 °C (99.6 °F) (Temporal)   Resp 18   Ht 1.702 m (5' 7\")   Wt 84.3 kg (185 lb 13.6 oz)   SpO2 94%     Physical Exam:  General: Well nourished, well developed, age appropriate appearance   HEENT: Normocephalic, atraumatic  Psych: Normal mood and affect  Neck: Supple, no pain to motion  Chest/Pulmonary: breathing unlabored, no audible wheezing  Cardio: Regular heart rate and rhythm  Neuro: Sensation grossly intact to BUE and BLE, moving all four extremities  Skin: Intact with no full thickness abrasions or lacerations  MSK: Right leg is in a long-leg splint.  No tenderness to passive range of motion of the toes.  She states that her sensation at the foot is normal to her.  Palpable dorsalis pedis pulse.    Imaging and labs: X-rays and CT scan of the right lower extremity show comminuted proximal third tibia fracture including a depressed lateral plateau fracture    Recent Labs     03/15/22  1655 03/16/22  0331   WBC 13.6* 8.4   RBC 3.84* 3.10*   HEMOGLOBIN 11.7* 9.5*   HEMATOCRIT 36.0* 28.8*   MCV 93.8 92.9   MCH 30.5 30.6   RDW 43.3 42.9   PLATELETCT 164 149*   MPV 13.3* 13.6*   NEUTSPOLYS 85.50*  --    LYMPHOCYTES 9.10*  --    MONOCYTES 4.60  --    EOSINOPHILS 0.10  --    BASOPHILS 0.30  --        Assessment:   1. Closed fracture of right tibia and fibula, initial encounter  DME Walker       We discussed the diagnosis and findings with the patient at length.  We reviewed possible non operative and operative interventions and the risks and benefits of each of these.  she had a chance to ask questions and all of these were answered to her satisfaction.        Plan:  N.p.o. at midnight  OR tomorrow for definitive fixation of the right plateau fracture and tibia fracture  Elevate the right leg at rest  Mobilize postoperatively  Nonweightbearing right lower extremity      "

## 2022-03-16 NOTE — ANESTHESIA PROCEDURE NOTES
Airway    Date/Time: 3/16/2022 2:51 PM  Performed by: Favian Tineo M.D.  Authorized by: Favian Tineo M.D.     Location:  OR  Urgency:  Elective  Indications for Airway Management:  Anesthesia      Spontaneous Ventilation: absent    Sedation Level:  Deep  Preoxygenated: Yes    Final Airway Type:  Supraglottic airway  Final Supraglottic Airway:  Standard LMA    SGA Size:  4  Number of Attempts at Approach:  1

## 2022-03-16 NOTE — ASSESSMENT & PLAN NOTE
-History of stroke  -Weightbears with right leg, but mostly drags right leg during ambulation  -Right hand contracture

## 2022-03-16 NOTE — CARE PLAN
The patient is Watcher - Medium risk of patient condition declining or worsening    Shift Goals  Clinical Goals: Pain control ,SX in AM  Patient Goals: Sx in AM  Family Goals: comfort, DC      Problem: Pain - Standard  Goal: Alleviation of pain or a reduction in pain to the patient’s comfort goal  Outcome: Progressing     Problem: Knowledge Deficit - Standard  Goal: Patient and family/care givers will demonstrate understanding of plan of care, disease process/condition, diagnostic tests and medications  Outcome: Progressing     Problem: Skin Integrity  Goal: Skin integrity is maintained or improved  Outcome: Progressing     Problem: Fall Risk  Goal: Patient will remain free from falls  Outcome: Progressing

## 2022-03-16 NOTE — PROGRESS NOTES
"[facility]  Medication Management Note    Evaluation completed by: Jeff Pettit M.D.   Date of Service: 03/15/22   Appointment type: in-office appointment.    Information below was collected from: patient    Special language or communication needs: No  Responded to any questions about patient rights: No  Reviewed limits of confidentiality: Yes  Confidentiality: The patient was informed that her medical records are confidential except for use by the treatment team in this clinic and others involved in her care.  Records may be shared with outside entities if the patient signs a release of information.  Information may be shared with appropriate authorities without a release of information to report instances of child/elder abuse or if it is determined she is in imminent risk of harm to self or others.     CHIEF COMPLAINT/REASON FOR VISIT  PTSD medication management and follow up    HISTORY OF PRESENT ILLNESS  Lyssa Leblanc is a 41 y.o. old female with h/o depression, PTSD, learning disability, seizure disorder, h/o stroke (patient reports at birth), right spastic hemiplegia who presents today for follow up management of PTSD, and insomnia.   Pt was last seen in July 2021, at which time the plan was to continue medications.     Speaking with patient today she presents to appointment with her daughter Delicia. Writer inquires if she would like her daughter to use the play room /if it is okay if her daughter is present and patient expresses preference to have her present.    Patient continues to be mildly guarded, but cooperative, engaged, and respectful throughout interview. Writer inquires about patient starting PT for her ankle with patient noting it is going well.    Patient reports her primary concern is her insomnia. Patient reports that since increasing trazodone to 150mg PO QHS that her sleep has improved \"a little bit,\" stating she sleep 7 hours nightly. In reviewing sleep " "habits, patient notes that she nearly always goes to sleep while being on her phone or with the television on. She estimates it takes her 1-2 hours to fall asleep. Writer discusses with patient limiting exposure to electronic screens before going to sleep noting the negative impact this exposure has been shown to have on sleep and patient is not receptive to making this change citing \"it runs through my family,\" in relation to her family's penchant to fall asleep with a television/electronic screen on. Patient continues to take hydroxyzine as prescribed and denies side effects with medications.    She notes appetite is \"fine\", energy is \"somewhat okay,\" states she still gets rodrigo out of caring for her children, endorses hypervigilance, presents as irritable (denies irritability), and denies derealization. Patient denies feelings of guilt/worthlessness as well as focusing on tasks such as reading a book or watching television. Patient endorses feeling subjectively slowed down/heavy.    Patient denies SI/HI/AVH.    PSYCHOSOCIAL CHANGES SINCE LAST VISIT   None noted, patient does not report difficulties with children. She notes she continues to not get along with her father well, while also noting he helps her with logistics of paying bills. Patient continues to live in duplex with her two children and father lives 1.5 blocks down the street. Patient reports income sources are her social security, her son's social security, as well as child support.    CURRENT MEDICATIONS   • Trazodone 150mg PO QHS  • hydroxyzine 50mg PO BID PRN  • Carbamazepine 400mg XR 12HR PO daily (prescribed by neurology)  • Divalproex 1250 PO QHS (prescribed by neurology)   • Meloxicam 7.5mg daily  • Gabapentin 300mg PO TID  • Lisinopril 10mg daily    MEDICAL REVIEW OF SYSTEMS  ROS: patient denies nausea, vomiting, chest pain, fever, SOB, cough, difficulties with urination or bowel movements.      ALLERGIES  No Known Allergies       MEDICAL " "HISTORY  Past Medical History:  7/9/2015: Anemia, iron deficiency  No date: Asthma  2019: Seizure (HCC)  No date: Stroke (HCC)      Comment:  as an infant   Past Surgical History:   Procedure Laterality Date   • WA LAP,DIAGNOSTIC ABDOMEN N/A 9/2/2020    Procedure: PELVISCOPY;  Surgeon: Garrett Osorio M.D.;  Location: SURGERY [facility];  Service: Gynecology   • SALPINGECTOMY Bilateral 9/2/2020    Procedure: SALPINGECTOMY;  Surgeon: Garrett Osorio M.D.;  Location: SURGERY [facility];  Service: Gynecology   • TUBAL COAGULATION LAPAROSCOPIC BILATERAL         PHYSICAL EXAMINATION  Vital signs: There were no vitals taken for this visit.  Musculoskeletal: gait is mildly improved from last visit, no boot present, less shuffling  Abnormal movements: none noted    MENTAL STATUS EXAMINATION    General: Lyssa Leblanc appears stated age and exhibits grooming which is casual.  Hygiene is fair.     Behavior: Pt is mildly irritable at times but otherwise calm and cooperative with interview.  No apparent distress.  Eye contact is appropriate.   Psychomotor: Psychomotor agitation or retardation Not noted.  Tics or tremors not noted.  Speech: rate within normal limits and volume within normal limits  Language: english  Mood: \"fine\" rates mood \"between one and 8\" as describes as \"somewhere in the middle\"  Affect: mildly restricted, mildly skewed toward anxious spectrum, mildly irritable, predominantly neutral, but appropriately responsive to conversation  Thought Process: Logical and Goal-directed  Thought Content: denies suicidal ideation, denies homicidal ideation. Within normal limits  Perception: denies auditory hallucinations, denies visual hallucinations. No delusions noted on interview.  Also noted on exam: N/A  Attention span and concentration: appropriate  Orientation: Alert and Fully Oriented  Recent and remote memory: No gross evidence of memory deficits  Insight: Adequate  Judgment: Adequate "     Labs:  2/26/21:  Carbamazapine: 10  VPA: 59.4      SAFETY ASSESSMENT - RISK TO SELF  • Current suicide attempts or self harm: No  • Past suicide attempts or self harm: No  • History of suicide by family member: No  • History of suicide by friend/significant other: No  • Recent change in amount/specificity/intensity of suicidal thoughts or self-harm behavior: No  • Ongoing substance use disorder: No  • Current access to firearms, medications, or other identified means of suicide/self-harm: unknown fire arm acces  • If yes, willing to restrict access to means of suicide/self-harm: N/a  • Protective factors present: Yes     SAFETY ASSESSMENT - RISK TO OTHERS  • Current aggressive behavior or risk to others: No  • Past aggressive behavior or risk to others: No  • Recent change in amount/specificity/intensity of thoughts or threats to harm others? No  • Current access to firearms/other identified means of harm? uknown  • If yes, willing to restrict access to weapons/means of harm? N/a     CURRENT RISK ASSESSMENT       Suicide: Low       Homicide: Low       Self-Harm: Low       Relapse: Low       Crisis Safety Plan Reviewed: Will review at follow up    [place] [facility] records  N/A    ASSESSMENT  PTSD vs. Trauma and Other Stressor Related Disorder  Unspecified Anxiety Disorder  Insomnia    H/o Learning Disability  R/O Intellectrual Disability    Seizure DIsorder    Lyssa Leblanc is a 41 y.o. old female with h/o depression, PTSD, learning disability, seizure disorder, h/o stroke (patient reports at birth), right spastic hemiplegia who presents today for follow up management of PTSD, and insomnia.      Patient's history of seizure disorder as well as associated medications for seizure ppx likely have a significant impact on her mood as well as associated side effects however she reports stability on these medications presently and has follow up established with neurology.    Patient's primary concern today is her insomnia  which she reports is mildly improved since increasing trazodone to 150mg PO QHS PRN.  Patient reports hydroxyzine is helpful as a PRN anxiety medication for her presently.    Will consider transition to wellcare at future follow up visits when appropriate based on patient's stability.    Patient's social circumstances have not changed and she has stable income in her social security that she receives (as well as her son's).    DIAGNOSES/PLAN  Problem 1: Insomnia  Status: deteriorated; but mildly improved from last visit  • Medications: continue trazodone 150mg PO QHS PRN for insomnia   • Psychotherapy: not presently going to therapy  • Labs/studies: none  • Other: none    Problem 2: Unspecified Anxiety Disorder  Status: Unchanged, present but responsive to medication  • Medications: hydroxyzine 50mg PO BID PRN  • Psychotherapy: previously attending therapy, not presently  • Labs/studies: none  • Other: none    • Medication options, alternatives (including no medications) and medication risks/benefits/side effects were discussed in detail.  • The patient was advised to call, message clinician on [employer]ri[employer], or come in to the clinic if symptoms worsen or if questions/issues regarding their medications arise.  The patient verbalized understanding and agreement.    • The patient was educated to call 911, call the suicide hotline, or go to the local ER if having thoughts of suicide or homicide.  The patient verbalized understanding and agreement.   • The proposed treatment plan was discussed with the patient who was provided the opportunity to ask questions and make suggestions regarding alternative treatment. Patient verbalized understanding and expressed agreement with the plan.      Return to clinic in 1 month or sooner if symptoms worsen.    This appointment was supervised by attending psychiatrist, Misbah López MD, who agrees with assessment and treatment plan.  See attending attestation for more details.       Jeff  KARLOS Pettit M.D.  03/15/22

## 2022-03-16 NOTE — THERAPY
Missed Therapy     Patient Name: Lyssa Leblanc  Age:  41 y.o., Sex:  female  Medical Record #: [id number]  Today's Date: 3/16/2022    PT consult received. Per chart, plan for OR today for fixation R tib-fib fx. PT will evaluate post-op as able/appropriate.

## 2022-03-17 ENCOUNTER — PHARMACY VISIT (OUTPATIENT)
Dept: PHARMACY | Facility: MEDICAL CENTER | Age: 42
End: 2022-03-17
Payer: MEDICARE

## 2022-03-17 VITALS
DIASTOLIC BLOOD PRESSURE: 60 MMHG | BODY MASS INDEX: 29.17 KG/M2 | HEIGHT: 67 IN | HEART RATE: 86 BPM | WEIGHT: 185.85 LBS | SYSTOLIC BLOOD PRESSURE: 128 MMHG | OXYGEN SATURATION: 95 % | TEMPERATURE: 97.9 F | RESPIRATION RATE: 16 BRPM

## 2022-03-17 PROBLEM — S82.891A ANKLE FRACTURE, RIGHT, CLOSED, INITIAL ENCOUNTER: Status: RESOLVED | Noted: 2022-03-15 | Resolved: 2022-03-17

## 2022-03-17 PROBLEM — E87.6 HYPOKALEMIA: Status: RESOLVED | Noted: 2022-03-15 | Resolved: 2022-03-17

## 2022-03-17 PROCEDURE — G0378 HOSPITAL OBSERVATION PER HR: HCPCS

## 2022-03-17 PROCEDURE — 97166 OT EVAL MOD COMPLEX 45 MIN: CPT

## 2022-03-17 PROCEDURE — 97530 THERAPEUTIC ACTIVITIES: CPT

## 2022-03-17 PROCEDURE — RXMED WILLOW AMBULATORY MEDICATION CHARGE: Performed by: NURSE PRACTITIONER

## 2022-03-17 PROCEDURE — 700102 HCHG RX REV CODE 250 W/ 637 OVERRIDE(OP): Performed by: INTERNAL MEDICINE

## 2022-03-17 PROCEDURE — A9270 NON-COVERED ITEM OR SERVICE: HCPCS | Performed by: INTERNAL MEDICINE

## 2022-03-17 PROCEDURE — 96372 THER/PROPH/DIAG INJ SC/IM: CPT

## 2022-03-17 PROCEDURE — 97116 GAIT TRAINING THERAPY: CPT

## 2022-03-17 PROCEDURE — 97162 PT EVAL MOD COMPLEX 30 MIN: CPT

## 2022-03-17 PROCEDURE — 99217 PR OBSERVATION CARE DISCHARGE: CPT | Performed by: NURSE PRACTITIONER

## 2022-03-17 PROCEDURE — 700111 HCHG RX REV CODE 636 W/ 250 OVERRIDE (IP): Performed by: INTERNAL MEDICINE

## 2022-03-17 PROCEDURE — 97535 SELF CARE MNGMENT TRAINING: CPT

## 2022-03-17 RX ORDER — OXYCODONE HYDROCHLORIDE 5 MG/1
5 TABLET ORAL EVERY 8 HOURS PRN
Qty: 15 TABLET | Refills: 0 | Status: SHIPPED | OUTPATIENT
Start: 2022-03-17 | End: 2022-03-22

## 2022-03-17 RX ADMIN — HEPARIN SODIUM 5000 UNITS: 5000 INJECTION, SOLUTION INTRAVENOUS; SUBCUTANEOUS at 05:11

## 2022-03-17 RX ADMIN — CARBAMAZEPINE 400 MG: 400 TABLET, EXTENDED RELEASE ORAL at 05:12

## 2022-03-17 RX ADMIN — MELOXICAM 7.5 MG: 7.5 TABLET ORAL at 05:11

## 2022-03-17 RX ADMIN — HEPARIN SODIUM 5000 UNITS: 5000 INJECTION, SOLUTION INTRAVENOUS; SUBCUTANEOUS at 14:42

## 2022-03-17 RX ADMIN — OXYCODONE HYDROCHLORIDE 10 MG: 10 TABLET ORAL at 00:44

## 2022-03-17 RX ADMIN — LISINOPRIL 10 MG: 10 TABLET ORAL at 06:00

## 2022-03-17 RX ADMIN — OXYCODONE HYDROCHLORIDE 10 MG: 10 TABLET ORAL at 04:36

## 2022-03-17 RX ADMIN — OXYCODONE 5 MG: 5 TABLET ORAL at 13:00

## 2022-03-17 ASSESSMENT — COGNITIVE AND FUNCTIONAL STATUS - GENERAL
MOVING TO AND FROM BED TO CHAIR: A LOT
HELP NEEDED FOR BATHING: A LOT
SUGGESTED CMS G CODE MODIFIER MOBILITY: CL
DRESSING REGULAR LOWER BODY CLOTHING: A LOT
SUGGESTED CMS G CODE MODIFIER MOBILITY: CL
CLIMB 3 TO 5 STEPS WITH RAILING: TOTAL
DRESSING REGULAR UPPER BODY CLOTHING: A LITTLE
TOILETING: A LOT
MOVING FROM LYING ON BACK TO SITTING ON SIDE OF FLAT BED: A LOT
WALKING IN HOSPITAL ROOM: TOTAL
TURNING FROM BACK TO SIDE WHILE IN FLAT BAD: A LOT
MOBILITY SCORE: 11
MOVING TO AND FROM BED TO CHAIR: A LOT
TURNING FROM BACK TO SIDE WHILE IN FLAT BAD: A LOT
CLIMB 3 TO 5 STEPS WITH RAILING: TOTAL
STANDING UP FROM CHAIR USING ARMS: A LITTLE
WALKING IN HOSPITAL ROOM: TOTAL
MOVING FROM LYING ON BACK TO SITTING ON SIDE OF FLAT BED: A LOT
SUGGESTED CMS G CODE MODIFIER DAILY ACTIVITY: CK
STANDING UP FROM CHAIR USING ARMS: A LITTLE
PERSONAL GROOMING: A LITTLE
DAILY ACTIVITIY SCORE: 16
MOBILITY SCORE: 11

## 2022-03-17 ASSESSMENT — GAIT ASSESSMENTS
GAIT LEVEL OF ASSIST: UNABLE TO PARTICIPATE
ASSISTIVE DEVICE: CANADIAN (LOFSTRAND) CRUTCHES
GAIT LEVEL OF ASSIST: UNABLE TO PARTICIPATE

## 2022-03-17 ASSESSMENT — PAIN DESCRIPTION - PAIN TYPE
TYPE: SURGICAL PAIN

## 2022-03-17 ASSESSMENT — ACTIVITIES OF DAILY LIVING (ADL): TOILETING: INDEPENDENT

## 2022-03-17 NOTE — DISCHARGE PLANNING
JOHNSON called [facility] and spoke with Stevenson and per Stevenson states no order for W/C only walker from 03/15/2022. Updated Stevenson that order placed today 03/17/2022 and it is for a W/C. Per Stevenson no order or Therapy notes received. Called and spoke with Ede CANCINO and he will F/U on [facility] and that referral had already been sent. Ambika MONTANEZ updated.

## 2022-03-17 NOTE — PROGRESS NOTES
"   Orthopaedic PA Progress Note    Interval changes:Doing well, wants to go home. Clear for disposition (home/SNF/IRF) from Orthopaedic team standpoint.    ROS - Patient denies any new issues. No chest pain, dyspnea, or fever.  Pain well controlled.    /60   Pulse 86   Temp 36.6 °C (97.9 °F) (Temporal)   Resp 16   Ht 1.702 m (5' 7\")   Wt 84.3 kg (185 lb 13.6 oz)   SpO2 95%     Patient seen and examined  No acute distress  Breathing non labored  RRR  DRessing intact  Skin warm  2+DP RLE, immed cap refill, sens intact to gross touch to all toes, discriminates heel v toe.  Able to minimally flex/extend ankle (= baseline)    Recent Labs     03/15/22  1655 03/16/22  0331   WBC 13.6* 8.4   RBC 3.84* 3.10*   HEMOGLOBIN 11.7* 9.5*   HEMATOCRIT 36.0* 28.8*   MCV 93.8 92.9   MCH 30.5 30.6   MCHC 32.5* 33.0*   RDW 43.3 42.9   PLATELETCT 164 149*   MPV 13.3* 13.6*       Active Hospital Problems    Diagnosis    • Ankle fracture, right, closed, initial encounter [S82.891A]    • Hypokalemia [E87.6]    • Depression, major [F32.9]    • Hypertension [I10]    • Right spastic hemiplegia (HCC) [G81.11]    • Obesity [E66.9]        Assessment/Plan:  POD#1 S/P CSF R tibial plateau, IMN R tibial shaft  Wt bearing status - TTWB in immobilizer  PT/OT-initiated  Wound care:dressings left in place  Drains - no  Wagner-no  Sutures/Staples out- 10-14 days post operatively  Antibiotics: complete  DVT Prophylaxis- TEDS/SCDs/Foot pumps.   Lovenox: Start 40 mg daily, Duration-until ambulatory > 150'  Future Procedures - none planned  Case Coordination for Discharge Planning - Disposition per Med/Tx  Brief D/W ACNP. Follow-Up: needs appointment with Dr. Mallory at [facility] at 10-14 days post-op for re-evaluation, staple removal and radiographs.        "

## 2022-03-17 NOTE — CARE PLAN
Problem: Pain - Standard  Goal: Alleviation of pain or a reduction in pain to the patient’s comfort goal  Outcome: Progressing     Problem: Knowledge Deficit - Standard  Goal: Patient and family/care givers will demonstrate understanding of plan of care, disease process/condition, diagnostic tests and medications  Outcome: Progressing     Problem: Skin Integrity  Goal: Skin integrity is maintained or improved  Outcome: Progressing     Problem: Fall Risk  Goal: Patient will remain free from falls  Outcome: Progressing   The patient is Stable - Low risk of patient condition declining or worsening    Shift Goals  Clinical Goals: Pain Management, PT/OT, Discharge  Patient Goals: Discharge  Family Goals: No family present    Progress made toward(s) clinical / shift goals:  Pt updated on POC, all questions answered. Pt assisted to get dressed. Pt awaiting wheelchair for discharge. Pt denies pain. Pt denies any needs at this time. Call light and personal belongings within reach.     Patient is not progressing towards the following goals:

## 2022-03-17 NOTE — DISCHARGE PLANNING
Meds-to-Beds: Discharge prescription orders listed below delivered to patient's bedside LISSETH Apple. Patient counseled and elected to have co-payment billed to patient account.       Current Outpatient Medications   Medication Sig Dispense Refill   • oxyCODONE immediate-release (ROXICODONE) 5 MG Tab Take 1 Tablet by mouth every 8 hours as needed for Severe Pain for up to 5 days. 15 Tablet 0   • enoxaparin (LOVENOX) 40 MG/0.4ML Solution inj Inject 40 mg (1 syringe) under the skin every day for 15 days. 15 Each 0      Maye Thomas, PharmD

## 2022-03-17 NOTE — FACE TO FACE
Face to Face Supporting Documentation - Home Health    The encounter with this patient was in whole or in part the primary reason for home health admission.    Date of encounter:   Patient:                    MRN:                       YOB: 2022  Lyssa Leblanc  [id number]  1980     Home health to see patient for:  Skilled Nursing care for assessment, interventions & education, Home health aide, Physical Therapy evaluation and treatment, Occupational therapy evaluation and treatment and Comment: RIGHT ankle fracture IM Alexis dressing    Skilled need for:  Surgical Aftercare RIGHT ankle fracture and Comment: PT/OT post surgical intervention    Skilled nursing interventions to include:  Comment: PT/OT    Homebound status evidenced by:  Need the aid of supportive devices such as crutches, canes, wheelchairs or walkers or Needs the assistance of another person in order to leave the home. Leaving home requires a considerable and taxing effort. There is a normal inability to leave the home.    Community Physician to provide follow up care: Ileana Araya P.A.-C.     Optional Interventions? No      I certify the face to face encounter for this home health care referral meets the CMS requirements and the encounter/clinical assessment with the patient was, in whole, or in part, for the medical condition(s) listed above, which is the primary reason for home health care. Based on my clinical findings: the service(s) are medically necessary, support the need for home health care, and the homebound criteria are met.  I certify that this patient has had a face to face encounter by myself.  KIRK Mosqueda - NPI: [id number]

## 2022-03-17 NOTE — ANESTHESIA TIME REPORT
Anesthesia Start and Stop Event Times     Date Time Event    3/16/2022 1415 Ready for Procedure     1444 Anesthesia Start     1702 Anesthesia Stop        Responsible Staff  03/16/22    Name Role Begin End    Favian Tineo M.D. Anesth 1444 1702        Preop Diagnosis (Free Text):  Pre-op Diagnosis     Tibia plateau and tibia shaft fracture        Preop Diagnosis (Codes):    Premium Reason  A. 3PM - 7AM    Comments:

## 2022-03-17 NOTE — FACE TO FACE
Face to Face Note  -  Durable Medical Equipment    KIRK Mosqueda - NPI: [id number]  I certify that this patient is under my care and that they had a durable medical equipment(DME)face to face encounter by myself that meets the physician DME face-to-face encounter requirements with this patient on:    Date of encounter:   Patient:                    MRN:                       YOB: 2022  Lyssa Leblanc  [id number]  1980     The encounter with the patient was in whole, or in part, for the following medical condition, which is the primary reason for durable medical equipment:  Post-Op Surgery    I certify that, based on my findings, the following durable medical equipment is medically necessary:  Wheel Chair.    HOME O2 Saturation Measurements:(Values must be present for Home Oxygen orders)         ,     ,         My Clinical findings support the need for the above equipment due to:  Abnormal Gait    Supporting Symptoms: RIGHT ankle fracture s/p IM Alexis     If patient feels more short of breath, they can go up to 6 liters per minute and contact healthcare provider.

## 2022-03-17 NOTE — PROGRESS NOTES
Pt father at bedside. Discharge instructions, medications and follow-up reviewed with pt, pt verbalized understanding and denies questions. Discharge paperwork given to pt. Pt provided with medication from meds to bed. PIV removed, armband removed. Pt awaiting wheelchair delivery.

## 2022-03-17 NOTE — DISCHARGE SUMMARY
Discharge Summary    CHIEF COMPLAINT ON ADMISSION  Chief Complaint   Patient presents with   • Extremity Fracture       Reason for Admission  Ankle fracture, right, closed, ini*     Admission Date  3/15/2022    CODE STATUS  Full Code    HPI & HOSPITAL COURSE     Ms. Lyssa Leblanc is a 41-year-old female with PMHx of infant stroke with right hemiplegia,depression, PTSD, learning disability, seizure disorder, insomnia who presented on 3/15/2022 due to right leg pain.    Patient reports she excellently tripped over a backpack and fell backwards on her right ankle.  Even though she does have right-sided hemiplegia, patient is able to use her right leg for weightbearing and mobilizing, but mostly drags her leg.  After her fall, patient experienced severe right leg pain and noticed to have swelling in her ankle area.  She was brought to urgent care where imaging was obtained and found patient to have a right tib-fib fracture.  Orthopedics was consulted, and patient was sent to the ER for surgical intervention.    Patient was seen in the ER by orthopedics and a splint to her right leg was placed.  In ER, notable findings noted /57, pulse 62, RR 16, temp 30 6.6C, O2 saturation 98% on room air.  Strict bedrest with elevation of right leg, ice to affected area and Covid screening obtained.  N.p.o. at midnight for anticipated right leg surgical intervention.  Patient admitted into the observation unit for management of care.    Patient underwent a CSF Right tibial plateau, IMN RIGHT tibial shaft on 3/16/2022 with Dr. Mallory.  Patient tolerated procedure well.  Patient was evaluated by physical therapy of which wheelchair and further PT/OT outpatient is recommended.  Discussed with patient and plan per orthopedics: Staples/sutures to come out on 10-14 days postoperatively, start Lovenox 40 mg SQ daily for 14 days or until seen by orthopedics follow-up, patient to work with PT/OT as an outpatient.    Patient seen and  examined prior to being discharged.  Discussed post discharge plan of care with patient and family, krissy Garcia.  Patient prescribed pain medication for 5 days to utilize in conjunction with physical therapy along with 14-day supply of Lovenox.  Patient to follow-up with PCP as indicated.  Patient to call [facility] with Dr. Mallory for follow-up post procedure in 1-2 weeks.  All questions and concerns answered prior to being discharged.  Patient discharged home.    Therefore, she is discharged in good and stable condition to home with close outpatient follow-up.    The patient recovered much more quickly than anticipated on admission.    Discharge Date  03/17/22      FOLLOW UP ITEMS POST DISCHARGE  Please call [phone] to schedule PCP appointment for patient.    Required specialty appointments include:       Discharge Instructions per Olivier Haro A.P.R.NEfren    -Follow-up with PCP as indicated  -Call the [facility] with Dr. Mallory for follow-up appointment in 1-2 weeks  -Utilize pain medications specifically when working with physical therapy  -Utilize wheelchair to ambulate  -Utilize SQ Lovenox injection daily until able to ambulate more than 150 feet; will need to follow-up with orthopedics until able to ambulate for refill of prescription      DIET: As tolerated    ACTIVITY: As tolerated    DIAGNOSIS: Right ankle fracture    Return to ER if symptoms persist, chest pain, palpitations, shortness of breath, numbness, tingling, weakness, and high fevers.      DISCHARGE DIAGNOSES  Principal Problem (Resolved):    Ankle fracture, right, closed, initial encounter POA: Yes  Active Problems:    Right spastic hemiplegia (HCC) POA: Yes    Obesity POA: Yes    Hypertension POA: Yes    Depression, major POA: Yes  Resolved Problems:    Hypokalemia POA: Yes      FOLLOW UP  Future Appointments   Date Time Provider Department Center   3/25/2022 10:30 AM eTrrance Livingston PTA [facility]   4/19/2022  9:30 AM Jeff  KARLOS Pettit M.D. [facility]   6/15/2022 11:20 AM Shiva Jackson M.D. [facility] None     Ileana Araya P.A.-C.  [address]  [facility] [address]  [phone]    Schedule an appointment as soon as possible for a visit in 1 week      Ryan Mallory M.D.  [address]  [facility] [id number] [phone]    Schedule an appointment as soon as possible for a visit in 2 weeks        MEDICATIONS ON DISCHARGE     Medication List      START taking these medications      Instructions   enoxaparin 40 MG/0.4ML Soln inj  Commonly known as: LOVENOX   Inject 40 mg under the skin every day for 15 days.  Dose: 40 mg     oxyCODONE immediate-release 5 MG Tabs  Commonly known as: ROXICODONE   Take 1 Tablet by mouth every 8 hours as needed for Severe Pain for up to 5 days.  Dose: 5 mg        CHANGE how you take these medications      Instructions   lisinopril 10 MG Tabs  What changed: when to take this  Commonly known as: PRINIVIL   TAKE 1 TABLET BY MOUTH EVERY DAY IN THE MORNING     TEGRETOL  MG Tb12  What changed:   · how much to take  · when to take this  · additional instructions  Generic drug: carBAMazepine SR   Doctor's comments: Dx: Seizure disorder (HCC) (G40.909); Seizures (HCC) (R56.9) JUSTIN  TAKE 1 TABLET BY MOUTH TWICE A DAY        CONTINUE taking these medications      Instructions   acetaminophen 500 MG Tabs  Commonly known as: TYLENOL   Take 500-1,000 mg by mouth every 6 hours as needed. Indications: Pain  Dose: 500-1,000 mg     divalproex  MG Tb24  Commonly known as: DEPAKOTE ER   Doctor's comments: PA APPROVED UNTIL 01/31/2023  Take 5 Tablets by mouth at bedtime for 180 days. Take 5 tablets at bedtime.  Dose: 1,250 mg     FOLIC ACID PO   Take 1 Tablet by mouth every day.  Dose: 1 Tablet     gabapentin 300 MG Caps  Commonly known as: NEURONTIN   Take 300 mg by mouth 3 times a day.  Dose: 300 mg     hydrOXYzine HCl 50 MG Tabs  Commonly known as: ATARAX   Take 1 Tablet by mouth 2 times a  day.  Dose: 50 mg     meloxicam 7.5 MG Tabs  Commonly known as: MOBIC   TAKE 1 TABLET BY MOUTH EVERY DAY  Dose: 7.5 mg     traZODone 50 MG Tabs  Commonly known as: DESYREL   Take 3 Tablets by mouth every evening.  Dose: 150 mg     VITAMIN D PO   Take 1 Tablet by mouth every day.  Dose: 1 Tablet            Allergies  No Known Allergies    DIET  Orders Placed This Encounter   Procedures   • Diet Order Diet: Regular     Standing Status:   Standing     Number of Occurrences:   1     Order Specific Question:   Diet:     Answer:   Regular [1]       ACTIVITY  As tolerated.  Weight bearing as tolerated    CONSULTATIONS  Orthopedics    PROCEDURES  CSF right tibial plateau, IM and right tibial shaft    IMAGING  DX-PORTABLE FLUORO > 1 HOUR   Final Result      Portable fluoroscopy utilized for 1 minute 28 seconds.         INTERPRETING LOCATION: [address] [facility], [id number]      DX-TIBIA AND FIBULA RIGHT   Final Result      Portable fluoroscopy as described.      IR-US GUIDED PIV   Final Result    Ultrasound-guided PERIPHERAL IV INSERTION performed by    qualified nursing staff as above.      [id number] W/O PLUS RECONS RIGHT   Final Result      1.  Comminuted, depressed tibial plateau fracture extending into the proximal/mid tibial shaft.      2.  Segmental, displaced proximal fibular fracture.               LABORATORY  Lab Results   Component Value Date    SODIUM 143 03/16/2022    POTASSIUM 3.5 (L) 03/16/2022    CHLORIDE 110 03/16/2022    CO2 22 03/16/2022    GLUCOSE 123 (H) 03/16/2022    BUN 8 03/16/2022    CREATININE 0.67 03/16/2022        Lab Results   Component Value Date    WBC 8.4 03/16/2022    HEMOGLOBIN 9.5 (L) 03/16/2022    HEMATOCRIT 28.8 (L) 03/16/2022    PLATELETCT 149 (L) 03/16/2022        Total time of the discharge process exceeds 36 minutes.    ============================================================================================================  Please note that this dictation was created using voice  recognition software. I have made every reasonable attempt to correct obvious errors, but there may be errors of grammar and possibly content that I did not discover before finalizing the note.    Electronically signed by:  BENJAMIN Haro, MSN, APRN, FNP-C  Hospitalist Services  [facility]  [phone]  [email or web address]  03/17/22                  [phone]

## 2022-03-17 NOTE — DISCHARGE INSTRUCTIONS
Discharge Instructions    Discharged to home by car with relative. Discharged via wheelchair, hospital escort: Yes.  Special equipment needed: Not Applicable    Be sure to schedule a follow-up appointment with your primary care doctor or any specialists as instructed.     Discharge Plan:   Diet Plan: Discussed  Activity Level: Discussed  Confirmed Follow up Appointment: Patient to Call and Schedule Appointment  Confirmed Symptoms Management: Discussed  Medication Reconciliation Updated: Yes  Influenza Vaccine Indication: Not indicated: Previously immunized this influenza season and > 8 years of age    I understand that a diet low in cholesterol, fat, and sodium is recommended for good health. Unless I have been given specific instructions below for another diet, I accept this instruction as my diet prescription.   Other diet: Regular    Special Instructions: None    · Is patient discharged on Warfarin / Coumadin?   No     Depression / Suicide Risk    As you are discharged from [facility], it is important to learn how to keep safe from harming yourself.    Recognize the warning signs:  · Abrupt changes in personality, positive or negative- including increase in energy   · Giving away possessions  · Change in eating patterns- significant weight changes-  positive or negative  · Change in sleeping patterns- unable to sleep or sleeping all the time   · Unwillingness or inability to communicate  · Depression  · Unusual sadness, discouragement and loneliness  · Talk of wanting to die  · Neglect of personal appearance   · Rebelliousness- reckless behavior  · Withdrawal from people/activities they love  · Confusion- inability to concentrate     If you or a loved one observes any of these behaviors or has concerns about self-harm, here's what you can do:  · Talk about it- your feelings and reasons for harming yourself  · Remove any means that you might use to hurt yourself (examples: pills, rope, extension  cords, firearm)  · Get professional help from the community (Mental Health, Substance Abuse, psychological counseling)  · Do not be alone:Call your Safe Contact- someone whom you trust who will be there for you.  · Call your local CRISIS HOTLINE 859-5249 or 313-947-5136  · Call your local Children's Mobile Crisis Response Team [place] (809) 365-1721 or www.[employer]  · Call the toll free National Suicide Prevention Hotlines   · National Suicide Prevention Lifeline 470-066-PYTH (4311)  · [employer] Hope Line Network 800-SUICIDE (471-7183)    FOLLOW UP ITEMS POST DISCHARGE  Please call [phone] to schedule PCP appointment for patient.    Required specialty appointments include:       Discharge Instructions per WENDY MosquedaREfrenNEfren    -Follow-up with PCP as indicated  -Call the [facility] with Dr. Mallory for follow-up appointment in 1-2 weeks  -Utilize pain medications specifically when working with physical therapy  -Utilize wheelchair to ambulate  -Utilize SQ Lovenox injection daily until able to ambulate more than 150 feet; will need to follow-up with orthopedics until able to ambulate for refill of prescription      DIET: As tolerated    ACTIVITY: As tolerated    DIAGNOSIS: Right ankle fracture    Return to ER if symptoms persist, chest pain, palpitations, shortness of breath, numbness, tingling, weakness, and high fevers.    Enoxaparin injection  What is this medicine?  ENOXAPARIN (ee nox a PA rin) is used after knee, hip, or abdominal surgeries to prevent blood clotting. It is also used to treat existing blood clots in the lungs or in the veins.  This medicine may be used for other purposes; ask your health care provider or pharmacist if you have questions.  COMMON BRAND NAME(S): Lovenox  What should I tell my health care provider before I take this medicine?  They need to know if you have any of these conditions:  · bleeding disorders, hemorrhage, or hemophilia  · infection of  the heart or heart valves  · kidney or liver disease  · previous stroke  · prosthetic heart valve  · recent surgery or delivery of a baby  · ulcer in the stomach or intestine, diverticulitis, or other bowel disease  · an unusual or allergic reaction to enoxaparin, heparin, pork or pork products, other medicines, foods, dyes, or preservatives  · pregnant or trying to get pregnant  · breast-feeding  How should I use this medicine?  This medicine is for injection under the skin. It is usually given by a health-care professional. You or a family member may be trained on how to give the injections. If you are to give yourself injections, make sure you understand how to use the syringe, measure the dose if necessary, and give the injection. To avoid bruising, do not rub the site where this medicine has been injected. Do not take your medicine more often than directed. Do not stop taking except on the advice of your doctor or health care professional.  Make sure you receive a puncture-resistant container to dispose of the needles and syringes once you have finished with them. Do not reuse these items. Return the container to your doctor or health care professional for proper disposal.  Talk to your pediatrician regarding the use of this medicine in children. Special care may be needed.  Overdosage: If you think you have taken too much of this medicine contact a poison control center or emergency room at once.  NOTE: This medicine is only for you. Do not share this medicine with others.  What if I miss a dose?  If you miss a dose, take it as soon as you can. If it is almost time for your next dose, take only that dose. Do not take double or extra doses.  What may interact with this medicine?  · aspirin and aspirin-like medicines  · certain medicines that treat or prevent blood clots  · dipyridamole  · NSAIDs, medicines for pain and inflammation, like ibuprofen or naproxen  This list may not describe all possible  interactions. Give your health care provider a list of all the medicines, herbs, non-prescription drugs, or dietary supplements you use. Also tell them if you smoke, drink alcohol, or use illegal drugs. Some items may interact with your medicine.  What should I watch for while using this medicine?  Visit your healthcare professional for regular checks on your progress. You may need blood work done while you are taking this medicine. Your condition will be monitored carefully while you are receiving this medicine. It is important not to miss any appointments.  If you are going to need surgery or other procedure, tell your healthcare professional that you are using this medicine.  Using this medicine for a long time may weaken your bones and increase the risk of bone fractures.  Avoid sports and activities that might cause injury while you are using this medicine. Severe falls or injuries can cause unseen bleeding. Be careful when using sharp tools or knives. Consider using an electric razor. Take special care brushing or flossing your teeth. Report any injuries, bruising, or red spots on the skin to your healthcare professional.  Wear a medical ID bracelet or chain. Carry a card that describes your disease and details of your medicine and dosage times.  What side effects may I notice from receiving this medicine?  Side effects that you should report to your doctor or health care professional as soon as possible:  · allergic reactions like skin rash, itching or hives, swelling of the face, lips, or tongue  · bone pain  · signs and symptoms of bleeding such as bloody or black, tarry stools; red or dark-brown urine; spitting up blood or brown material that looks like coffee grounds; red spots on the skin; unusual bruising or bleeding from the eye, gums, or nose  · signs and symptoms of a blood clot such as chest pain; shortness of breath; pain, swelling, or warmth in the leg  · signs and symptoms of a stroke such as  changes in vision; confusion; trouble speaking or understanding; severe headaches; sudden numbness or weakness of the face, arm or leg; trouble walking; dizziness; loss of coordination  Side effects that usually do not require medical attention (report to your doctor or health care professional if they continue or are bothersome):  · hair loss  · pain, redness, or irritation at site where injected  This list may not describe all possible side effects. Call your doctor for medical advice about side effects. You may report side effects to FDA at 7-837-GCK-4448.  Where should I keep my medicine?  Keep out of the reach of children.  Store at room temperature between 15 and 30 degrees C (59 and 86 degrees F). Do not freeze. If your injections have been specially prepared, you may need to store them in the refrigerator. Ask your pharmacist. Throw away any unused medicine after the expiration date.  NOTE: This sheet is a summary. It may not cover all possible information. If you have questions about this medicine, talk to your doctor, pharmacist, or health care provider.  © 2020 Elsevier/Gold Standard (2018-12-13 11:25:34)      Closed Reduction for Ankle Fracture or Dislocation, Care After  This sheet gives you information about how to care for yourself after your procedure. Your health care provider may also give you more specific instructions. If you have problems or questions, contact your health care provider.  What can I expect after the procedure?  After the procedure, it is common to have:  · Pain or discomfort.  · Swelling.  · Bruising or bluish discoloration.  Follow these instructions at home:  Medicines  · Take over-the-counter and prescription medicines only as told by your health care provider.  · Ask your health care provider if the medicine prescribed to you:  ? Requires you to avoid driving or using heavy machinery.  ? Can cause constipation. You may need to take actions to prevent or treat constipation,  such as:  § Drink enough fluid to keep your urine pale yellow.  § Take over-the-counter or prescription medicines.  § Eat foods that are high in fiber, such as beans, whole grains, and fresh fruits and vegetables.  § Limit foods that are high in fat and processed sugars, such as fried or sweet foods.  If you have a cast, splint, or boot:  · Check the skin around it every day. Tell your health care provider about any concerns.  · Keep it clean.  · If the cast, splint, boot is not waterproof:  ? Do not let it get wet.  ? Cover it with a watertight covering when you take a bath or shower.  If you have a cast:  · Do not put pressure on any part of the cast until it is fully hardened. This may take several hours.  · Do not stick anything inside the cast to scratch your skin. Doing that increases your risk of infection.  · You may put lotion on dry skin around the edges of the cast. Do not put lotion on the skin underneath the cast.  If you have a splint or boot:  · Wear the splint or boot as told by your health care provider. Remove it only as told by your health care provider.  · Loosen the splint or boot if your toes tingle, become numb, or turn cold and blue.  Managing pain, stiffness, and swelling    · If directed, put ice on your ankle area.  ? If you have a removable splint or boot, remove it as told by your health care provider.  ? Put ice in a plastic bag.  ? Place a towel between your skin and the bag or between your cast and the bag.  ? Leave the ice on for 20 minutes, 2-3 times a day.  · Move your toes often to reduce stiffness and swelling.  · Raise (elevate) your ankle above the level of your heart while you are sitting or lying down.  Activity  · Return to your normal activities as told by your health care provider. Ask your health care provider what activities are safe for you.  · Do not use the injured limb to support your body weight until your health care provider says that you can. This may take  several weeks. Use crutches, a walker, or a wheelchair as told by your health care provider.  · Do exercises such as specific physical therapy for your ankle as instructed by your health care provider.  Driving  · Do not drive for 24 hours if you were given a sedative during your procedure.  · Ask your health care provider when it is safe to drive if you have a cast, splint, or boot on the foot that you use for driving.  General instructions  · Do not drink alcohol if your health care provider tells you not to drink.  · Do not use any products that contain nicotine or tobacco, such as cigarettes, e-cigarettes, and chewing tobacco. These can delay bone healing. If you need help quitting, ask your health care provider.  · Keep all follow-up visits as told by your health care provider. This is important.  Contact a health care provider if you have:  · A fever.  · Pain that is not controlled by your pain medicine.  Get help right away if you have:  · A severe increase in pain or swelling.  · Toes that tingle or become numb.  · Loss of feeling in your leg, foot, or ankle.  · Toes that are cold and blue.  · Pain, tenderness, or redness in your calf.  · A new rash around your ankle or cast, or if you notice any leakage of fluid from the skin.  · Bleeding around the ankle or underneath or around the cast.  · Chest pain.  · Difficulty breathing.  Summary  · After the procedure, it is common to have pain or discomfort, swelling, and bruising or bluish discoloration around your ankle.  · Follow instructions for taking care of your injury as you recover at home. You may be given specific instructions for taking medicines and wearing a boot, cast, or splint.  · Use ice and medicine to control swelling and pain. Follow instructions on not bearing weight on your injured ankle. You may have to use crutches, a walker, or a wheelchair.  · Contact a health care provider if you have a fever or pain that does not go away.  · Get help  right away if your pain worsens or your toes tingle, become numb, or turn cold or blue. Also, get help right away if you have chest pain, tenderness in your calf, a rash around your ankle or cast, or bleeding around the ankle.  This information is not intended to replace advice given to you by your health care provider. Make sure you discuss any questions you have with your health care provider.  Document Released: 11/28/2016 Document Revised: 11/13/2019 Document Reviewed: 11/13/2019  Elsevier Patient Education © 2020 Elsevier Inc.

## 2022-03-17 NOTE — DISCHARGE PLANNING
Care Transition Team Assessment    Spoke with patient at bedside with Niece on phone with patient. Lives with Niece and patients kids. Lives in single story house. Father lives down the street. PCP Ileana Araya. Prior to admit used no DME. Has Medicare and Medicaid. [facility]ses [facility] [address]. Family will be ride @ D/C. Anticipate HHC @ D/C.     Information Source  Orientation Level: Oriented X4  Information Given By: Patient    Readmission Evaluation  Is this a readmission?: No    Interdisciplinary Discharge Planning  Primary Care Physician: Ileana BANG  Lives with - Patient's Self Care Capacity: Other (Comments) (Niece and kids)  Patient or legal guardian wants to designate a caregiver: No  Support Systems: Family Member(s),Parent  Housing / Facility: [address]  Do You Take your Prescribed Medications Regularly: Yes  Able to Return to Previous ADL's: Future Time w/Therapy  Mobility Issues: Yes  Prior Services: Home-Independent  Patient Prefers to be Discharged to:: Home  Assistance Needed: Unknown at this Time  Durable Medical Equipment: Not Applicable    Discharge Preparedness  What are your discharge supports?: Parent,Other (comment) (Niece)  Prior Functional Level: Ambulatory    Functional Assesment  Prior Functional Level: Ambulatory    Finances  Prescription Coverage: Yes    Anticipated Discharge Information  Discharge Disposition: Discharged to home/self care (01)  Discharge Address: [address] [place] [address]  Discharge Contact Phone Number: [phone]

## 2022-03-17 NOTE — DISCHARGE PLANNING
Received Choice form at 0959  Agency/Facility Name: [facility]  Referral sent per Choice form @ 1003     Received Choice form at 1000  [facility]  Referral sent per Choice form @ 1006     @1157  [facility]Facility Name[facility]  Spoke To: Niecy  Outcome: Per Niecy, admissions just got the referral. DPA to follow up.    Agency/Facility Name: [facility]   Spoke To: Suad  Outcome: Per Suad, she will review and follow up with DPA.    Agency/Facility Name: [facility]  Spoke To: Suad  Outcome: Per Shirin, pt is accepted. Per Suad, she requested OP notes and D/C summary faxed to her.    @7246   DPA received a voicemail from [facility] requesting PT/OT notes. DPA printed and manually faxed to [facility]. DPA to follow up regarding referral status.    [facility]Fac[facility]  Spoke To: Hannah  Outcome: DPA followed up to notify Hannah that the PT/OT that were requested were faxed over along with the wheelchair order.    [facility]  Outcome: Left a voicemail, waiting for a call back.    [facility]Facility [facility]  Spoke To: Niecy  Outcome: Per Niecy, they never received the fax for the PT/OT notes or wheelchair order. Per JOHNSON, DPA is calling [facility] to order a wheelchair.     [facility]Facility [facility]  Spoke To: Alber  Outcome: DPA forwarding order to Alber. Per Alber, he'll be bedside in 20 minutes.

## 2022-03-17 NOTE — PROGRESS NOTES
Patient care assumed. Report received from Henny MONTANEZ    Assessment completed, patient family at bedside. Pt A&Ox 4. Respirations are even and unlabored on 2liters via nasal cannula postop. Patient states she is RA at baseline. Pt reports 4/10 RLE pain, but denies PRN medications, and would just like an ice pack at this time. Ice pack provided to patient. VS stable, call light and belongings within reach. High fall risk precautions in place. POC updated (Pain control, PT/OT). Pt educated on room and call light, pt verbalized understanding. Communication board updated. Needs met.

## 2022-03-17 NOTE — THERAPY
Missed Therapy     Patient Name: Lyssa Leblanc  Age:  41 y.o., Sex:  female  Medical Record #: [id number]  Today's Date: 3/17/2022           03/17/22 0803   Interdisciplinary Plan of Care Collaboration   Collaboration Comments PT eval order received. Need placement of weight bearing orders per surgeon prior to initiation of PT eval.

## 2022-03-17 NOTE — CARE PLAN
The patient is Stable - Low risk of patient condition declining or worsening    Shift Goals  Clinical Goals: Pain control, PT/OT  Patient Goals: pain control, rest  Family Goals: comfort, rest      Problem: Pain - Standard  Goal: Alleviation of pain or a reduction in pain to the patient’s comfort goal  Outcome: Progressing     Problem: Knowledge Deficit - Standard  Goal: Patient and family/care givers will demonstrate understanding of plan of care, disease process/condition, diagnostic tests and medications  Outcome: Progressing     Problem: Skin Integrity  Goal: Skin integrity is maintained or improved  Outcome: Progressing     Problem: Fall Risk  Goal: Patient will remain free from falls  Outcome: Progressing

## 2022-03-17 NOTE — DISCHARGE PLANNING
Received request for HHC and W/C. Spoke with patient at bedside. Choice form filled out with verbal consent and signed by patient. Choice forms faxed to [facility]. Message sent to Ede DPA to update.

## 2022-03-17 NOTE — OR NURSING
Patient recovering well post op. AAOx4, calm, denies pain and nausea. VSS, afebrile, 2L nasal cannula 98% breathing even and unlabored. Right LE surgical dressing CDI. Right LE elevated on pillows, ice applied. Right toes pink, warm, brisk cap refill, denies numbness. Tolerating sips of water. Family updated on status and plan of care.

## 2022-03-17 NOTE — THERAPY
"Occupational Therapy   Initial Evaluation     Patient Name: Lyssa Leblanc  Age:  41 y.o., Sex:  female  Medical Record #: [id number]  Today's Date: 3/17/2022     Precautions: Toe Touch Weight Bearing Right Lower Extremity  Comments: hx R hemiplegia    Assessment  Patient is 41 y.o. female s/p R tibia repair, TTWB RLE. 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 Pt has good social support from her niece and father who are able to assist pt as needed and with pt's two young kids. Pt is currently limited by pain and WB limitations that make functional transfers and ADLs more difficult. Pts family states her home's bathroom is not entirely wheelchair accessible so bedside commode is recommended. Pt is adamant to DC home ASAP.      Plan    Recommend Occupational Therapy 3 times per week until therapy goals are met for the following treatments:  Adaptive Equipment, Self Care/Activities of Daily Living, Therapeutic Activities, and Therapeutic Exercises.    DC Equipment Recommendations: Bed Side Commode  Discharge Recommendations: Recommend home health for continued occupational therapy services     Subjective    \"I have to get home to my kids, I have to go home today.\"     Objective     03/17/22 0951   Prior Living Situation   Prior Services None   Housing / Facility 1 Story House   Steps Into Home 2   Bathroom Set up Walk In Shower;Bathtub / Shower Combination   Equipment Owned None   Lives with - Patient's Self Care Capacity Child Less than 18 Years of Age   Comments pt lives with her niece and kids aged 13 and 7. Her father lives down the street and is able to assist as needed   Prior Level of ADL Function   Self Feeding Independent   Grooming / Hygiene Independent   Bathing Independent   Dressing Independent   Toileting Independent   Prior Level of IADL Function   Medication Management Independent   Laundry Independent   Kitchen Mobility Independent   Finances Independent "   Home Management Independent   Shopping Independent   Prior Level Of Mobility Independent Without Device in Community;Independent Without Device in Home   Driving / Transportation Driving Independent   Precautions   Precautions Toe Touch Weight Bearing Right Lower Extremity   Comments hx R hemiplegia   Pain 0 - 10 Group   Therapist Pain Assessment During Activity;4   Cognition    Cognition / Consciousness X   Level of Consciousness Alert   Comments tangential, perseverative, intermittently irritable and emotionally labile, baseline.   Active ROM Upper Body   Comments RUE limited at baseline d/t hx CVA   Strength Upper Body   Comments RUE limited at baseline d/t hx CVA   Coordination Upper Body   Comments RUE limited at baseline d/t hx CVA   Balance Assessment   Sitting Balance (Static) Fair +   Sitting Balance (Dynamic) Fair   Standing Balance (Static) Fair   Standing Balance (Dynamic) Trace +   Weight Shift Sitting Fair   Weight Shift Standing Absent   Bed Mobility    Supine to Sit Supervised   Scooting Supervised   ADL Assessment   Eating Supervision   Grooming Supervision;Seated   Upper Body Dressing Supervision   Lower Body Dressing Moderate Assist   How much help from another person does the patient currently need...   Putting on and taking off regular lower body clothing? 2   Bathing (including washing, rinsing, and drying)? 2   Toileting, which includes using a toilet, bedpan, or urinal? 2   Putting on and taking off regular upper body clothing? 3   Taking care of personal grooming such as brushing teeth? 3   Eating meals? 4   6 Clicks Daily Activity Score 16   Functional Mobility   Sit to Stand Supervised   Bed, Chair, Wheelchair Transfer Contact Guard Assist   Transfer Method Stand Pivot   Mobility w/ platform FWW, txf only   Activity Tolerance   Sitting in Chair 20+min   Sitting Edge of Bed functional   Standing transfers only   Patient / Family Goals   Patient / Family Goal #1 home ASAP   Short Term  Goals   Short Term Goal # 1 pt will demo toileting ADL at SPV level   Short Term Goal # 2 pt will demo LB dress with Pretty   Education Group   Education Provided Home Safety;Transfers;Role of Occupational Therapist;Activities of Daily Living   Role of Occupational Therapist Patient Response Patient;Acceptance;Explanation;Verbal Demonstration   Home Safety Patient Response Patient;Acceptance;Explanation;Handout;Verbal Demonstration   Transfers Patient Response Patient;Acceptance;Explanation;Verbal Demonstration;Action Demonstration;Reinforcement Needed   ADL Patient Response Patient;Acceptance;Explanation;Verbal Demonstration;Action Demonstration   Problem List   Problem List Decreased Homemaking Skills;Decreased Active Daily Living Skills;Decreased Upper Extremity Strength Right;Decreased Functional Mobility;Decreased Activity Tolerance;Safety Awareness Deficits / Cognition;Impaired Cognitive Function;Limited Knowledge of Post Op Precautions   Anticipated Discharge Equipment and Recommendations   DC Equipment Recommendations Bed Side Commode   Discharge Recommendations Recommend home health for continued occupational therapy services

## 2022-03-17 NOTE — THERAPY
Physical Therapy   Initial Evaluation     Patient Name: Lyssa Leblanc  Age:  41 y.o., Sex:  female  Medical Record #: [id number]  Today's Date: 3/17/2022     Precautions  Precautions: Fall Risk;Toe Touch Weight Bearing Right Lower Extremity  Comments: R UE deficits from prior CVA. No op note at time of PT eval or formal orders, ortho PA infromed therapist pt to be TTWB R LE.    Assessment  Ms. Leblanc is a 42 y/o female who presents to acute secondary to GLF that resulted in R tibial shaft fracture. No op report at time of eval, however per anesthesiologist note and discussion with ortho PA pt is s/p intramedullary allison placement and is TTWB. Educated pt on WB status. She has limited use of her R UE from prior CVA. (distal to elbow limited muscular control). Attempted a variety of ADs including platform walker and [place] crutches. While patient is able to come to standing on her own with all manner of devices was unable to sequence any steps even with visual, verbal, and tactile cues from therapist. Was able to perform pivot transfer into chair. Discussed discharge options, pt adamant she discharge home today, became very emotional. Pt's father on phone via [employer] and showed steps into house (two steps, quite small). Father stated he would be able to bump her up the steps in a wheelchair and he is willing to assist as needed. Pt agreeable to discharge home at a wheelchair level of function and have home health therapies continue to focus on gait training as her primary objective is to return home as soon as possible. Informed interdisciplinary team of this. Acute PT to continue to follow while in house.    Plan    Recommend Physical Therapy 4 times per week until therapy goals are met for the following treatments:  Bed Mobility, Gait Training, Neuro Re-Education / Balance, Stair Training, Therapeutic Activities, and Therapeutic Exercises    DC Equipment Recommendations: Wheelchair  Discharge  "Recommendations: Other - (see assessment)       Subjective    \"I need to get home today\"     Objective       03/17/22 0920   Prior Living Situation   Prior Services Home With Outpatient Therapy   Housing / Facility 1 Story House   Steps Into Home 2   Equipment Owned None   Lives with - Patient's Self Care Capacity Child Less than 18 Years of Age;Unrelated Adult   Comments pt's dad lives nearby and can assist. Dad on phone and confirmed this   Prior Level of Functional Mobility   Bed Mobility Independent   Transfer Status Independent   Ambulation Independent   Distance Ambulation (Feet)   (community ambulator)   Assistive Devices Used None   Stairs Independent   History of Falls   History of Falls Yes   Date of Last Fall   (reason for admission)   Cognition    Level of Consciousness Alert   Comments emotionally labile   Passive ROM Lower Body   Passive ROM Lower Body WDL   Active ROM Lower Body    Active ROM Lower Body  X   Comments R LE limited due to pain and dressing, L WFL   Strength Lower Body   Lower Body Strength  X   Comments R LE grossly 2/5, L WFL   Sensation Lower Body   Lower Extremity Sensation   WDL   Balance Assessment   Sitting Balance (Static) Fair +   Sitting Balance (Dynamic) Fair   Standing Balance (Static) Fair   Standing Balance (Dynamic) Trace +   Weight Shift Sitting Fair   Weight Shift Standing Absent   Gait Analysis   Gait Level Of Assist Unable to Participate   Assistive Device [place] (Lofstrand) Crutches   Bed Mobility    Supine to Sit Supervised   Sit to Supine   (up in chair)   Scooting Supervised   Functional Mobility   Sit to Stand Supervised   Bed, Chair, Wheelchair Transfer Contact Guard Assist   How much difficulty does the patient currently have...   Turning over in bed (including adjusting bedclothes, sheets and blankets)? 2   Sitting down on and standing up from a chair with arms (e.g., wheelchair, bedside commode, etc.) 2   Moving from lying on back to sitting on the side of " the bed? 2   How much help from another person does the patient currently need...   Moving to and from a bed to a chair (including a wheelchair)? 2   Need to walk in a hospital room? 1   Climbing 3-5 steps with a railing? 1   6 clicks Mobility Score 10   Short Term Goals    Short Term Goal # 1 Pt will perform bed to chair transfer with SPV in 6 visits to increase independence   Short Term Goal # 2 Pt will ambulate 50ft with PFW and SPV in 6 visits to increase independence   Short Term Goal # 3 Pt will ascend/descend 2 steps with min assist in 6 visits to enter/exit home

## 2022-03-18 ASSESSMENT — PAIN SCALES - GENERAL: PAIN_LEVEL: 4

## 2022-03-18 NOTE — OP REPORT
DATE OF OPERATION: 3/16/2022     PREOPERATIVE DIAGNOSIS: Right lateral tibial plateau fracture, right tibial shaft fracture    POSTOPERATIVE DIAGNOSIS: Same    PROCEDURE PERFORMED: Open reduction internal fixation of right lateral tibial plateau fracture, intervention nail fixation of right tibial shaft fracture    SURGEON: Ryan Mallory M.D.     ASSISTANT: Melvin Rodriguez MD    ANESTHESIA: General    SPECIMEN: None    ESTIMATED BLOOD LOSS: 50 mL    IMPLANTS: [place] 6.5 mm cannulated screws, [place] T2 alpha tibial nail      INDICATIONS: The patient is a 41 y.o. female who presented with with a right tibial plateau and tibial shaft fracture.  This occurred after a fall at home as she is try to step over a backpack.  She has a history of hemiplegia the fracture right side from a stroke in childhood.  She does still use that leg for balance and some walking.  She has some sensation as well to the leg.  I recommended open reduction total fixation of the tibial plateau fracture as well as intramedullary fixation of the shaft fracture.  I discussed the risks and benefits of the procedure which include but are not limited to risks of infection, wound healing complication, neurovascular injury, pain, malunion, non-union, malrotation, and the medical risks of anesthesia including MI, stroke, and death.  Alternatives to surgery were also discussed, including non-operative management, which I did not recommend.  The patient was in agreement with the plan to proceed, and the informed consent was signed and documented.  I met with the patient pre-operatively and marked the operative extremity with their agreement.  We proceeded to the operating room.     DESCRIPTION OF PROCEDURE:  Patient was seen in the preoperative holding area on the day of surgery. The operative site was marked with my initials.  she was taken to the operating room and placed supine on the operative table.  Anesthesia was induced.  The operative  extremity was prepped and draped in the normal sterile fashion.  Operative pause was conducted and the correct patient, site, side, procedure, and surgeon's initials on extremity were identified.  Initial curvilinear incision was made over the lateral plateau.  This was taken down through the subcutaneous tissue and fascia to the lateral plateau.  The musculature was elevated off en solitario.  This gave exposure to the lateral plateau fracture.  There is a depressed posterior lateral piece of articular surface.  By hinging open the fracture site this was able to be reduced using a Berry elevator.  Beneath this fracture cancellous bone graft was used to backfill and help further support this depressed fracture component.  2 guidewires for 6.5 mm cannulated screws were then inserted percutaneously and directed on both AP and lateral views using image intensifier.  These were wrapped directly underneath the breast fracture fragment and across the lateral split as well.  Once these guidewires were in place attention was turned to the tibial nail.    A suprapatellar start point was chosen for the tibial nail.  This was taken down the subcu tissues to the level of the quadriceps tendon.  This was sharply incised with a 10 blade knife.  The suprapatellar guide was then inserted and a guidepin was placed at the start point on the proximal tibia.  Once were happy at the start point the wire was advanced.  An opening drill was used to gain access intramedullary space.  Ball-tipped guidewire was then advanced through this opening staying center center within the canal.  The fracture was held in reduced position using external manipulation as well as an F tool.  After the guidewire was passed distally towards the ankle this was measured for length.  This was sequentially reamed up to 12-1/2 mm.  An 11 mm nail was chosen.  This was inserted through the suprapatellar incision.  The fracture was held reduced while this nail was  passed.  Once the nail was fully seated distal interlock screws were placed using perfect [place] technique.  The proximal ones were placed using the outrigger guide.  Prior to placement of the posterolaterally directed screw screws were placed across the plateau.  1 partially-threaded screw as well as one fully threaded screws were placed over the previous percutaneous wires.  These further compress the lateral plateau and decrease this to his normal condylar width.  The depressed articular component remained in a well reduced position.  Following placement of these 2 screws the posterior lateral directed screw through the nail was also placed further giving support to the lateral plateau.  This point all wounds were thoroughly irrigated including irrigating out the knee joint.  These were closed in layered fashion with 0 and 2-0 Vicryl and staples.  Final fluoroscopic images have been obtained.  Sterile dressings were applied and the patient was allowed to wake in the operating room taken back in stable condition    POSTOPERATIVE PLAN: Nonweightbearing right lower extremity for the next 6 weeks to allow for further healing of the articular surface.  Okay to work on knee range of motion early and often.  Return to clinic in 2 weeks time for repeat x-rays of the knee and the tib-fib.      ____________________________________   Ryan Mallory M.D.   DD: 3/18/2022  10:46 AM

## 2022-03-18 NOTE — ANESTHESIA POSTPROCEDURE EVALUATION
Patient: Lyssa Leblanc    Procedure Summary     Date: 03/16/22 Room / Location: [place] [address] / SURGERY [facility]    Anesthesia Start: 1444 Anesthesia Stop: 1702    Procedure: INSERTION, INTRAMEDULLARY PRINCESS, TIBIA (Right Leg Lower) Diagnosis: (Tibia plateau and tibia shaft fracture)    Surgeons: Ryan Mallory M.D. Responsible Provider: Favian Tineo M.D.    Anesthesia Type: general ASA Status: 3          Final Anesthesia Type: general  Last vitals  BP   Blood Pressure: 128/60    Temp   36.6 °C (97.9 °F)    Pulse   86   Resp   16    SpO2   95 %      Anesthesia Post Evaluation    Patient location during evaluation: PACU  Patient participation: complete - patient participated  Level of consciousness: awake and alert  Pain score: 4    Airway patency: patent  Anesthetic complications: no  Cardiovascular status: hemodynamically stable  Respiratory status: acceptable  Hydration status: euvolemic    PONV: none          No complications documented.     Nurse Pain Score: 4 (NPRS)

## 2022-03-21 ENCOUNTER — TELEPHONE (OUTPATIENT)
Dept: MEDICAL GROUP | Facility: MEDICAL CENTER | Age: 42
End: 2022-03-21
Payer: MEDICARE

## 2022-03-21 NOTE — TELEPHONE ENCOUNTER
"·  paperwork received from [facility] services requiring provider signature.     · All appropriate fields completed by Medical Assistant: Yes    · Paperwork placed in \"MA to Provider\" folder/basket. Awaiting provider completion/signature.  "

## 2022-03-24 ENCOUNTER — TELEPHONE (OUTPATIENT)
Dept: MEDICAL GROUP | Facility: MEDICAL CENTER | Age: 42
End: 2022-03-24
Payer: MEDICARE

## 2022-03-24 NOTE — TELEPHONE ENCOUNTER
"·  paperwork received from [facility] requiring provider signature.     · All appropriate fields completed by Medical Assistant: Yes    · Paperwork placed in \"MA to Provider\" folder/basket. Awaiting provider completion/signature.  "

## 2022-03-28 ENCOUNTER — TELEPHONE (OUTPATIENT)
Dept: MEDICAL GROUP | Facility: MEDICAL CENTER | Age: 42
End: 2022-03-28
Payer: MEDICARE

## 2022-03-28 NOTE — TELEPHONE ENCOUNTER
Paper: [facility].    Scanned: Under  for review / Client Coordination Note report

## 2022-03-28 NOTE — TELEPHONE ENCOUNTER
"·  [facility] and [facility]    · All appropriate fields completed by Medical Assistant: Yes    · Paperwork placed in \"MA to Provider\" folder/basket. Awaiting provider completion/signature.  "

## 2022-03-29 ENCOUNTER — OFFICE VISIT (OUTPATIENT)
Dept: MEDICAL GROUP | Facility: MEDICAL CENTER | Age: 42
End: 2022-03-29
Attending: PHYSICIAN ASSISTANT
Payer: MEDICARE

## 2022-03-29 VITALS
HEART RATE: 70 BPM | TEMPERATURE: 96.8 F | RESPIRATION RATE: 17 BRPM | BODY MASS INDEX: 29.19 KG/M2 | HEIGHT: 67 IN | DIASTOLIC BLOOD PRESSURE: 70 MMHG | WEIGHT: 186 LBS | SYSTOLIC BLOOD PRESSURE: 102 MMHG | OXYGEN SATURATION: 97 %

## 2022-03-29 DIAGNOSIS — S82.831A CLOSED FRACTURE OF PROXIMAL END OF RIGHT FIBULA, UNSPECIFIED FRACTURE MORPHOLOGY, INITIAL ENCOUNTER: ICD-10-CM

## 2022-03-29 DIAGNOSIS — S82.141A CLOSED FRACTURE OF RIGHT TIBIAL PLATEAU, INITIAL ENCOUNTER: ICD-10-CM

## 2022-03-29 DIAGNOSIS — M85.80 OSTEOPENIA, UNSPECIFIED LOCATION: ICD-10-CM

## 2022-03-29 DIAGNOSIS — Z00.00 HEALTH CARE MAINTENANCE: ICD-10-CM

## 2022-03-29 DIAGNOSIS — M85.88 OTHER SPECIFIED DISORDERS OF BONE DENSITY AND STRUCTURE, OTHER SITE: ICD-10-CM

## 2022-03-29 PROCEDURE — 99214 OFFICE O/P EST MOD 30 MIN: CPT | Performed by: PHYSICIAN ASSISTANT

## 2022-03-29 PROCEDURE — 99212 OFFICE O/P EST SF 10 MIN: CPT | Performed by: PHYSICIAN ASSISTANT

## 2022-03-29 ASSESSMENT — FIBROSIS 4 INDEX: FIB4 SCORE: 0.92

## 2022-03-29 NOTE — PROGRESS NOTES
Chief Complaint   Patient presents with   • Follow-Up     Subjective:     HPI:   Lyssa Leblanc is a 41 y.o. female here to discuss the evaluation and management of:    Closed fracture of right tibial plateau, closed fracture of proximal end of right fibula  Lyssa presents today for follow up. She sustained a closed comminuted tibial plateau fracture and proximal fibular fracture on 3/15/22 and underwent ORIF. She has been doing well since. Pain is minimal at this time. She has a follow up with ortho on 3/31/22. She is requesting to go over lab results that were drawn during her hospital stay.    ROS  See HPI.     No Known Allergies    Current medicines (including changes today)  Current Outpatient Medications   Medication Sig Dispense Refill   • enoxaparin (LOVENOX) 40 MG/0.4ML Solution inj Inject 40 mg (1 syringe) under the skin every day for 15 days. 15 Each 0   • traZODone (DESYREL) 50 MG Tab Take 3 Tablets by mouth every evening. 30 Tablet 3   • hydrOXYzine HCl (ATARAX) 50 MG Tab Take 1 Tablet by mouth 2 times a day. 30 Tablet 3   • acetaminophen (TYLENOL) 500 MG Tab Take 500-1,000 mg by mouth every 6 hours as needed. Indications: Pain     • VITAMIN D PO Take 1 Tablet by mouth every day.     • FOLIC ACID PO Take 1 Tablet by mouth every day.     • gabapentin (NEURONTIN) 300 MG Cap Take 300 mg by mouth 3 times a day.     • lisinopril (PRINIVIL) 10 MG Tab TAKE 1 TABLET BY MOUTH EVERY DAY IN THE MORNING (Patient taking differently: Take 10 mg by mouth every day.) 30 Tablet 0   • TEGRETOL  MG TABLET SR 12 HR TAKE 1 TABLET BY MOUTH TWICE A DAY (Patient taking differently: Take 400 mg by mouth 2 times a day. TAKE 1 TABLET BY MOUTH TWICE A DAY) 170 Tablet 1   • meloxicam (MOBIC) 7.5 MG Tab TAKE 1 TABLET BY MOUTH EVERY DAY 30 Tablet 0   • divalproex ER (DEPAKOTE ER) 250 MG TABLET SR 24 HR Take 5 Tablets by mouth at bedtime for 180 days. Take 5 tablets at bedtime. 450 Tablet 1     No current  "facility-administered medications for this visit.       Social History     Tobacco Use   • Smoking status: Former Smoker     Packs/day: 2.00     Types: Cigarettes   • Smokeless tobacco: Current User     Types: Chew   • Tobacco comment: 2 packs a day and vape   Vaping Use   • Vaping Use: Every day   • Substances: Flavoring   • Devices: Pre-filled pod   Substance Use Topics   • Alcohol use: No   • Drug use: No       Patient Active Problem List    Diagnosis Date Noted   • Closed fracture of right tibial plateau 03/29/2022   • Closed fracture of upper end of right fibula 03/29/2022   • Sprain of left ankle 01/03/2022   • Left elbow pain 10/20/2021   • Post traumatic stress disorder 07/20/2021   • Ulnar nerve compression 01/22/2021   • Depression, major 10/09/2020   • Hypertension 09/29/2020   • Abnormal liver enzymes 09/29/2020   • Asthma 09/02/2020   • Arthralgia of right knee 07/01/2020   • Osteopenia 01/22/2020   • Carpal tunnel syndrome, left 12/27/2019   • Anxiety 09/13/2019   • Insomnia 09/13/2019   • Mood disorder (HCC) 09/13/2019   • Learning disability 06/26/2019   • Right spastic hemiplegia (HCC) 06/26/2019   • Foot drop, right 05/30/2018   • Seizure disorder ([facility]) 05/23/2018   • Hyperlipidemia 04/24/2018   • Family history of cardiovascular disease 04/11/2018   • History of stroke 04/11/2018   • Family tension 11/29/2016   • Other specified postprocedural states 10/08/2015   • Anemia, iron deficiency 07/09/2015   • Family history of diabetes mellitus 07/09/2015   • Obesity 06/05/2015   • Scoliosis 11/25/2014     Family History   Problem Relation Age of Onset   • Heart Disease Mother    • Diabetes Mother    • Hypertension Father    • Diabetes Maternal Grandmother    • Cancer Neg Hx    • Hyperlipidemia Neg Hx       Objective:     /70 (BP Location: Left arm, Patient Position: Sitting, BP Cuff Size: Adult)   Pulse 70   Temp 36 °C (96.8 °F) (Skin)   Resp 17   Ht 1.702 m (5' 7\")   Wt 84.4 kg (186 lb)   " SpO2 97%  Body mass index is 29.13 kg/m².    Physical Exam:  Physical Exam  Vitals reviewed.   Constitutional:       Appearance: Normal appearance.   HENT:      Head: Normocephalic.      Right Ear: External ear normal.      Left Ear: External ear normal.   Eyes:      Pupils: Pupils are equal, round, and reactive to light.   Cardiovascular:      Rate and Rhythm: Normal rate and regular rhythm.      Heart sounds: Normal heart sounds.   Pulmonary:      Effort: Pulmonary effort is normal.      Breath sounds: Normal breath sounds.   Musculoskeletal:      Comments: Leg cast present on left lower leg.   Neurological:      General: No focal deficit present.      Mental Status: She is alert and oriented to person, place, and time.   Psychiatric:         Mood and Affect: Mood normal.         Behavior: Behavior normal.         Judgment: Judgment normal.       Assessment and Plan:     The following treatment plan was discussed:    1. Closed fracture of right tibial plateau, initial encounter, Closed fracture of proximal end of right fibula, unspecified fracture morphology, initial encounter  - Patient is doing well since post op.  - Plan: Continue follow up with Ortho as scheduled.    2. Osteopenia, unspecified location  - This is a chronic condition.  - Plan: Repeat DEXA scan based on recent fracture. Obtain follow up labs. Start on calcium supplement in combination with vitamin D.  - DS-BONE DENSITY STUDY (DEXA); Future  - VITAMIN D,25 HYDROXY; Future    3. Health care maintenance  - Plan: Obtain follow up labs from recent hospitalization.  - CBC WITH DIFFERENTIAL; Future  - Comp Metabolic Panel; Future  - FERRITIN; Future  - IRON/TOTAL IRON BIND; Future  - HEMOGLOBIN A1C; Future    4. Other specified disorders of bone density and structure, other site   - DS-BONE DENSITY STUDY (DEXA); Future    Any change or worsening of signs or symptoms, patient encouraged to follow-up or report to emergency room for further evaluation.  Patient verbalizes understanding and agrees.    Follow-Up: Return if symptoms worsen or fail to improve.      PLEASE NOTE: This dictation was created using voice recognition software. I have made every reasonable attempt to correct obvious errors, but I expect that there are errors of grammar and possibly content that I did not discover before finalizing the note.

## 2022-03-29 NOTE — ASSESSMENT & PLAN NOTE
Lyssa presents today for follow up. She sustained a closed comminuted tibial plateau fracture and proximal fibular fracture on 3/15/22 and underwent ORIF. She has been doing well since. Pain is minimal at this time. She has a follow up with ortho on 3/31/22. She is requesting to go over lab results that were drawn during her hospital stay.

## 2022-04-09 DIAGNOSIS — M25.522 LEFT ELBOW PAIN: ICD-10-CM

## 2022-04-10 DIAGNOSIS — I10 HYPERTENSION, UNSPECIFIED TYPE: ICD-10-CM

## 2022-04-11 RX ORDER — MELOXICAM 7.5 MG/1
7.5 TABLET ORAL DAILY
Qty: 30 TABLET | Refills: 0 | Status: SHIPPED | OUTPATIENT
Start: 2022-04-11 | End: 2022-05-09

## 2022-04-11 RX ORDER — LISINOPRIL 10 MG/1
TABLET ORAL
Qty: 30 TABLET | Refills: 0 | Status: SHIPPED | OUTPATIENT
Start: 2022-04-11 | End: 2022-05-03

## 2022-04-12 ENCOUNTER — APPOINTMENT (OUTPATIENT)
Dept: RADIOLOGY | Facility: MEDICAL CENTER | Age: 42
End: 2022-04-12
Attending: PHYSICIAN ASSISTANT
Payer: MEDICARE

## 2022-04-14 ENCOUNTER — TELEPHONE (OUTPATIENT)
Dept: MEDICAL GROUP | Facility: MEDICAL CENTER | Age: 42
End: 2022-04-14
Payer: MEDICARE

## 2022-04-14 NOTE — TELEPHONE ENCOUNTER
·  paperwork received from [facility] requiring provider signature.     · All appropriate fields completed by Medical Assistant: Yes    · Paperwork placed in MA folder/basket to process.

## 2022-04-15 ENCOUNTER — HOSPITAL ENCOUNTER (OUTPATIENT)
Dept: RADIOLOGY | Facility: MEDICAL CENTER | Age: 42
End: 2022-04-15
Attending: PHYSICIAN ASSISTANT
Payer: MEDICARE

## 2022-04-15 DIAGNOSIS — S82.141A CLOSED FRACTURE OF RIGHT TIBIAL PLATEAU, INITIAL ENCOUNTER: ICD-10-CM

## 2022-04-15 DIAGNOSIS — M85.80 OSTEOPENIA, UNSPECIFIED LOCATION: ICD-10-CM

## 2022-04-15 DIAGNOSIS — S82.831A CLOSED FRACTURE OF PROXIMAL END OF RIGHT FIBULA, UNSPECIFIED FRACTURE MORPHOLOGY, INITIAL ENCOUNTER: ICD-10-CM

## 2022-04-15 DIAGNOSIS — M85.88 OTHER SPECIFIED DISORDERS OF BONE DENSITY AND STRUCTURE, OTHER SITE: ICD-10-CM

## 2022-04-15 PROCEDURE — 77080 DXA BONE DENSITY AXIAL: CPT

## 2022-04-19 ENCOUNTER — OFFICE VISIT (OUTPATIENT)
Dept: BEHAVIORAL HEALTH | Facility: PSYCHIATRIC FACILITY | Age: 42
End: 2022-04-19
Payer: MEDICARE

## 2022-04-19 DIAGNOSIS — F43.10 POST TRAUMATIC STRESS DISORDER: ICD-10-CM

## 2022-04-19 PROCEDURE — 99215 OFFICE O/P EST HI 40 MIN: CPT | Performed by: STUDENT IN AN ORGANIZED HEALTH CARE EDUCATION/TRAINING PROGRAM

## 2022-04-20 ENCOUNTER — HOSPITAL ENCOUNTER (OUTPATIENT)
Dept: LAB | Facility: MEDICAL CENTER | Age: 42
End: 2022-04-20
Attending: PHYSICIAN ASSISTANT
Payer: MEDICARE

## 2022-04-20 DIAGNOSIS — Z00.00 HEALTH CARE MAINTENANCE: ICD-10-CM

## 2022-04-20 DIAGNOSIS — M85.80 OSTEOPENIA, UNSPECIFIED LOCATION: ICD-10-CM

## 2022-04-20 LAB
25(OH)D3 SERPL-MCNC: 52 NG/ML (ref 30–100)
ALBUMIN SERPL BCP-MCNC: 4.4 G/DL (ref 3.2–4.9)
ALBUMIN/GLOB SERPL: 1.8 G/DL
ALP SERPL-CCNC: 78 U/L (ref 30–99)
ALT SERPL-CCNC: 10 U/L (ref 2–50)
ANION GAP SERPL CALC-SCNC: 13 MMOL/L (ref 7–16)
AST SERPL-CCNC: 8 U/L (ref 12–45)
BASOPHILS # BLD AUTO: 0.3 % (ref 0–1.8)
BASOPHILS # BLD: 0.02 K/UL (ref 0–0.12)
BILIRUB SERPL-MCNC: 0.2 MG/DL (ref 0.1–1.5)
BUN SERPL-MCNC: 16 MG/DL (ref 8–22)
CALCIUM SERPL-MCNC: 9.3 MG/DL (ref 8.5–10.5)
CHLORIDE SERPL-SCNC: 101 MMOL/L (ref 96–112)
CO2 SERPL-SCNC: 24 MMOL/L (ref 20–33)
CREAT SERPL-MCNC: 0.74 MG/DL (ref 0.5–1.4)
EOSINOPHIL # BLD AUTO: 0.69 K/UL (ref 0–0.51)
EOSINOPHIL NFR BLD: 11.5 % (ref 0–6.9)
ERYTHROCYTE [DISTWIDTH] IN BLOOD BY AUTOMATED COUNT: 41.8 FL (ref 35.9–50)
EST. AVERAGE GLUCOSE BLD GHB EST-MCNC: 77 MG/DL
FERRITIN SERPL-MCNC: 157 NG/ML (ref 10–291)
GFR SERPLBLD CREATININE-BSD FMLA CKD-EPI: 104 ML/MIN/1.73 M 2
GLOBULIN SER CALC-MCNC: 2.4 G/DL (ref 1.9–3.5)
GLUCOSE SERPL-MCNC: 87 MG/DL (ref 65–99)
HBA1C MFR BLD: 4.3 % (ref 4–5.6)
HCT VFR BLD AUTO: 36.6 % (ref 37–47)
HGB BLD-MCNC: 11.7 G/DL (ref 12–16)
IMM GRANULOCYTES # BLD AUTO: 0.02 K/UL (ref 0–0.11)
IMM GRANULOCYTES NFR BLD AUTO: 0.3 % (ref 0–0.9)
IRON SATN MFR SERPL: 29 % (ref 15–55)
IRON SERPL-MCNC: 70 UG/DL (ref 40–170)
LYMPHOCYTES # BLD AUTO: 2.34 K/UL (ref 1–4.8)
LYMPHOCYTES NFR BLD: 38.9 % (ref 22–41)
MCH RBC QN AUTO: 29.3 PG (ref 27–33)
MCHC RBC AUTO-ENTMCNC: 32 G/DL (ref 33.6–35)
MCV RBC AUTO: 91.5 FL (ref 81.4–97.8)
MONOCYTES # BLD AUTO: 0.45 K/UL (ref 0–0.85)
MONOCYTES NFR BLD AUTO: 7.5 % (ref 0–13.4)
NEUTROPHILS # BLD AUTO: 2.49 K/UL (ref 2–7.15)
NEUTROPHILS NFR BLD: 41.5 % (ref 44–72)
NRBC # BLD AUTO: 0 K/UL
NRBC BLD-RTO: 0 /100 WBC
PLATELET # BLD AUTO: 145 K/UL (ref 164–446)
PMV BLD AUTO: 13.7 FL (ref 9–12.9)
POTASSIUM SERPL-SCNC: 4.7 MMOL/L (ref 3.6–5.5)
PROT SERPL-MCNC: 6.8 G/DL (ref 6–8.2)
RBC # BLD AUTO: 4 M/UL (ref 4.2–5.4)
SODIUM SERPL-SCNC: 138 MMOL/L (ref 135–145)
TIBC SERPL-MCNC: 244 UG/DL (ref 250–450)
UIBC SERPL-MCNC: 174 UG/DL (ref 110–370)
WBC # BLD AUTO: 6 K/UL (ref 4.8–10.8)

## 2022-04-20 PROCEDURE — 83550 IRON BINDING TEST: CPT | Mod: GA

## 2022-04-20 PROCEDURE — 82306 VITAMIN D 25 HYDROXY: CPT

## 2022-04-20 PROCEDURE — 85025 COMPLETE CBC W/AUTO DIFF WBC: CPT

## 2022-04-20 PROCEDURE — 83036 HEMOGLOBIN GLYCOSYLATED A1C: CPT | Mod: GA

## 2022-04-20 PROCEDURE — 83540 ASSAY OF IRON: CPT | Mod: GA

## 2022-04-20 PROCEDURE — 36415 COLL VENOUS BLD VENIPUNCTURE: CPT

## 2022-04-20 PROCEDURE — 82728 ASSAY OF FERRITIN: CPT | Mod: GA

## 2022-04-20 PROCEDURE — 80053 COMPREHEN METABOLIC PANEL: CPT

## 2022-04-20 RX ORDER — HYDROXYZINE 50 MG/1
50 TABLET, FILM COATED ORAL 2 TIMES DAILY
Qty: 30 TABLET | Refills: 3 | Status: SHIPPED | OUTPATIENT
Start: 2022-04-20 | End: 2022-05-03 | Stop reason: SDUPTHER

## 2022-04-20 RX ORDER — TRAZODONE HYDROCHLORIDE 50 MG/1
150 TABLET ORAL NIGHTLY
Qty: 30 TABLET | Refills: 3 | Status: SHIPPED | OUTPATIENT
Start: 2022-04-20 | End: 2022-05-21 | Stop reason: SDUPTHER

## 2022-04-20 NOTE — PROGRESS NOTES
[facility]  Medication Management Note    Evaluation completed by: Jeff Pettit M.D.   Date of Service: 04/19/22   Appointment type: in-office appointment.    Information below was collected from: patient    Special language or communication needs: No  Responded to any questions about patient rights: No  Reviewed limits of confidentiality: Yes  Confidentiality: The patient was informed that her medical records are confidential except for use by the treatment team in this clinic and others involved in her care.  Records may be shared with outside entities if the patient signs a release of information.  Information may be shared with appropriate authorities without a release of information to report instances of child/elder abuse or if it is determined she is in imminent risk of harm to self or others.     CHIEF COMPLAINT/REASON FOR VISIT  Anxiety and insomnia medication management and follow up    HISTORY OF PRESENT ILLNESS  Lyssa Leblanc is a 41 y.o. old female with h/o depression, PTSD, learning disability, seizure disorder, h/o stroke (patient reports at birth), right spastic hemiplegia who presents today for follow up management of PTSD, and insomnia.   Pt was last seen in 3/15/22, at which time the plan was to continue medications.     Speaking with patient today she presents to appointment in wheel chair with her father. Per patient, her father, and chart review patient fell, tripped on a backpack on the floor and then fractured her tibia requiring an open reduction and internal fixation with allison placement. This occurred between 3/15 and 3/17./ In part of her after care, she is receiving physical therapy 2x weekly with therapists coming to her home as well as counseling and/or case management.    Patient father notes some concerns relating to patient's mood lability, as well as depressed mood, noting that patient spends a notable amount of time sleeping, sitting and being on  "her phone. When asked this was less so the case before the surgery and fracture but has worsened since. Patient's father is upfront that he and patient argue at time and that this upsets patient causing her to \"shut down\" and respond minimally at times. Some of this is observed throughout visit while writer makes an effort to continue engaging patient throughout visit. Patient's father presents a letter from [facility] Precious Eduardo who is working with patient noting patient's \"lack of insight is a hindrence to her ability to participate in therapy,\" as well as stating \"I suggest speaking with her neurologist about adding either a true antidepressant (prozac, celexa, wellbutrin, etc) or a low dose antipsychotic to help with her agitation and mood.\"    Writer and attending discussed with patient and her father that medication changes may be appropriate but that due to the significant and notable stressor of a major fracture and associated surgery that medication changes are not appropriate presently. Also noted patient is presently on 2 different mood stabilizing medications at significant doses for her seizures and that her behavioral issues, relationship difficulties and mood lability may be limited in their ability to respond to medication changes.    Furthermore, discussed potential transfer of care to [facility] in the future, noting patient would benefit from wraparound services from one institution where case management, psychiatry, and other services/needs can be better coordinated. Discussed potential to overlap visits in the future when this transition is made but due to recent stressor of fracture and ORIF, expressed that this change does not need to happen immediately and should wait until patient is stable.    Patient describes her mood today as, \"It's just very irritating,\" later on noting \"I just don't do anything, I can't do anything,\" noting her frustration in her significantly decreased function and " "being told at her last visit with her orthopedist that she needed to remain non-weight bearing. Patient's father believes her mood is very much hinged on the recommendations of her orthopedist at upcoming follow up appointment to an extant while also noting the he still believes patient is suffering from depressed mood otherwise endorsing aforementioned irritability and lability.    Patient reports taking trazodone and hydroxyzine as prescribed and denies side effects. Patient reports sleeping poorly recently 2/2 her leg, estimates 8.5 hours of sleep previously noting going to bed at 1030, falling asleep shortly after, and getting up around 6AM. Patient and her father note decreased energy since fracture as well as prior noting patient spends a great deal of time sitting on couch and father expressing concerns about patient's ability to tend to chores around house and \"take care of herself.\" He also reports concerns related to patient allowing her friends over to the house more often than she should. However, both patient and patient's father denies any concerns of child abuse/neglect clearly and vehemently stating \"no she loves her kids she takes care of them and feeds them and makes sure they bath and go to school.\" Patient endorses anhedonia stating she has \"no motivation.\" Patient denies feelings of guilt or worthlessness outside of decreased function 2/2 her leg fracture. Patient's father reports concerns related to patient's attention and focus noting that patient spends too much time on her phone and that this is a common behavioral pattern for her. In relation to anxiety patient states, \"very much so,\" noting it has been \"worse since the surgery,\" and is a daily occurrence. Patient identifies relationships with her father and 14yo son as primary stressors that make her anxious and has difficulty identifying other stressors, flat out denying any other stressors at one point. Patient reports time away from her " 14yo son or her father as relieving factors for her anxiety.    Patient denies SI/HI/AVH.    PSYCHOSOCIAL CHANGES SINCE LAST VISIT   None noted, patient does not report difficulties with children. She notes she continues to not get along with her father well, while also noting he helps her with logistics of paying bills. Patient continues to live in duplex with her two children and father lives 1.5 blocks down the street. Patient reports income sources are her social security, her son's social security, as well as child support.    CURRENT MEDICATIONS   • Trazodone 150mg PO QHS  • hydroxyzine 50mg PO BID PRN  • Carbamazepine 400mg XR 12HR PO daily (prescribed by neurology)  • Divalproex 1250 PO QHS (prescribed by neurology)   • Meloxicam 7.5mg daily  • Gabapentin 300mg PO TID  • Lisinopril 10mg daily    MEDICAL REVIEW OF SYSTEMS  ROS: patient denies nausea, vomiting, chest pain, fever, SOB, cough, difficulties with urination or bowel movements.      ALLERGIES  No Known Allergies       MEDICAL HISTORY  Past Medical History:  7/9/2015: Anemia, iron deficiency  No date: Asthma  2019: Seizure (HCC)  No date: Stroke (HCC)      Comment:  as an infant   Past Surgical History:   Procedure Laterality Date   • LA LAP,DIAGNOSTIC ABDOMEN N/A 9/2/2020    Procedure: PELVISCOPY;  Surgeon: Garrett Osorio M.D.;  Location: SURGERY [facility];  Service: Gynecology   • SALPINGECTOMY Bilateral 9/2/2020    Procedure: SALPINGECTOMY;  Surgeon: Garrett Osorio M.D.;  Location: SURGERY [facility];  Service: Gynecology   • TUBAL COAGULATION LAPAROSCOPIC BILATERAL         PHYSICAL EXAMINATION  Vital signs: There were no vitals taken for this visit.  Musculoskeletal: gait is mildly improved from last visit, no boot present, less shuffling  Abnormal movements: none noted    MENTAL STATUS EXAMINATION    General: Lyssa Leblanc appears stated age and exhibits grooming which is casual.  Hygiene is fair.     Behavior: Pt is mild to  "moderately irritable at times but otherwise calm and cooperative with interview.  No apparent distress.  Eye contact is appropriate.   Psychomotor: Psychomotor agitation or retardation Not noted.  Tics or tremors not noted.  Speech: rate within normal limits and volume within normal limits  Language: english  Mood: \"it's just very hard and irritating\"  Affect: mildly restricted, mildly skewed toward anxious spectrum, mildly to moderately irritable, but appropriately responsive to conversation  Thought Process: Logical and Goal-directed  Thought Content: denies suicidal ideation, denies homicidal ideation. Within normal limits  Perception: denies auditory hallucinations, denies visual hallucinations. No delusions noted on interview.  Also noted on exam: N/A  Attention span and concentration: appropriate  Orientation: Alert and Fully Oriented  Recent and remote memory: No gross evidence of memory deficits  Insight: Adequate  Judgment: Adequate     Labs:  2/26/21:  Carbamazapine: 10  VPA: 59.4      SAFETY ASSESSMENT - RISK TO SELF  • Current suicide attempts or self harm: No  • Past suicide attempts or self harm: No  • History of suicide by family member: No  • History of suicide by friend/significant other: No  • Recent change in amount/specificity/intensity of suicidal thoughts or self-harm behavior: No  • Ongoing substance use disorder: No  • Current access to firearms, medications, or other identified means of suicide/self-harm: unknown fire arm acces  • If yes, willing to restrict access to means of suicide/self-harm: N/a  • Protective factors present: Yes     SAFETY ASSESSMENT - RISK TO OTHERS  • Current aggressive behavior or risk to others: No  • Past aggressive behavior or risk to others: No  • Recent change in amount/specificity/intensity of thoughts or threats to harm others? No  • Current access to firearms/other identified means of harm? uknown  • If yes, willing to restrict access to weapons/means of " harm? N/a     CURRENT RISK ASSESSMENT       Suicide: Low       Homicide: Low       Self-Harm: Low       Relapse: Low       Crisis Safety Plan Reviewed: Will review at follow up    [facility] records  N/A    ASSESSMENT  PTSD vs. Trauma and Other Stressor Related Disorder  Adjustment Disorder vs. Unspecified Mood Disorder  Unspecified Anxiety Disorder  Insomnia    H/o Learning Disability  R/O Intellectrual Disability    Seizure DIsorder    Lyssa Leblanc is a 41 y.o. old female with h/o depression, PTSD, learning disability, seizure disorder, h/o stroke (patient reports at birth), right spastic hemiplegia who presents today for follow up management of PTSD, and insomnia.      Patient recent significant tibia fracture with associated open reduction and internal fixation are undoubtedly impacting her mood/anciety and irritability presently. Furthermore patient's history of seizure disorder as well as associated medications for seizure ppx likely have a significant impact on her mood (ie providing mood stabilization in terms of carbemazepine 400mg PO BID and divalproex 1250mg PO daily) as well as associated side effects (such as sedation) however she reports stability on these medications presently and has follow up established with neurology.    Medication changes to help address anxiety and possible associated irritabilty/mood lability in the future may be beneficial however the isolated effect may be limited due to patient's response while on significant doses of 2 mood stabilizing agents and patient's learning disability vs. intellectual disability, and her coping skills or lack thereof that she has developed throughout her lifetime. Furthermore assessment of efficacy and efficacy in and of itself is presently limited due to stressor of fracture and surgery. Will continue to assess appropriateness of adding medications at follow up visits and plan to follow up with patient in 2 weeks.    Will consider transition  to wellcare at future follow up visits when appropriate based on patient's stability.    Patient's social circumstances have not changed and she has stable income in her social security that she receives (as well as her son's).    DIAGNOSES/PLAN  Problem 1: Insomnia  Status: deteriorated  • Medications: continue trazodone 150mg PO QHS PRN for insomnia   • Psychotherapy: not presently going to therapy  • Labs/studies: none  • Other: none    Problem 2: Unspecified Anxiety Disorder  Status: Unchanged, present but responsive to medication  • Medications: hydroxyzine 50mg PO BID PRN  • Psychotherapy: previously attending therapy, not presently  • Labs/studies: none  • Other: none    • Medication options, alternatives (including no medications) and medication risks/benefits/side effects were discussed in detail.  • The patient was advised to call, message clinician on [employer], or come in to the clinic if symptoms worsen or if questions/issues regarding their medications arise.  The patient verbalized understanding and agreement.    • The patient was educated to call 911, call the suicide hotline, or go to the local ER if having thoughts of suicide or homicide.  The patient verbalized understanding and agreement.   • The proposed treatment plan was discussed with the patient who was provided the opportunity to ask questions and make suggestions regarding alternative treatment. Patient verbalized understanding and expressed agreement with the plan.      Return to clinic in 2 weeks or sooner if symptoms worsen.    This appointment was supervised by attending psychiatrist, Misbah López MD, who agrees with assessment and treatment plan.  See attending attestation for more details.       Jeff Pettit M.D.  04/19/22

## 2022-05-02 ENCOUNTER — TELEPHONE (OUTPATIENT)
Dept: MEDICAL GROUP | Facility: MEDICAL CENTER | Age: 42
End: 2022-05-02
Payer: MEDICARE

## 2022-05-02 NOTE — TELEPHONE ENCOUNTER
"·  paperwork received from [facility] requiring provider signature and review.    · All appropriate fields completed by Medical Assistant: Yes    · Paperwork placed in \"MA to Provider\" folder/basket. Awaiting provider completion/signature.  "

## 2022-05-03 ENCOUNTER — OFFICE VISIT (OUTPATIENT)
Dept: BEHAVIORAL HEALTH | Facility: PSYCHIATRIC FACILITY | Age: 42
End: 2022-05-03
Payer: MEDICARE

## 2022-05-03 DIAGNOSIS — F43.10 POST TRAUMATIC STRESS DISORDER: ICD-10-CM

## 2022-05-03 PROCEDURE — 99214 OFFICE O/P EST MOD 30 MIN: CPT | Mod: GC | Performed by: STUDENT IN AN ORGANIZED HEALTH CARE EDUCATION/TRAINING PROGRAM

## 2022-05-03 RX ORDER — HYDROXYZINE 50 MG/1
50 TABLET, FILM COATED ORAL 2 TIMES DAILY PRN
Qty: 60 TABLET | Refills: 2 | Status: SHIPPED | OUTPATIENT
Start: 2022-05-03 | End: 2022-05-21 | Stop reason: SDUPTHER

## 2022-05-04 NOTE — PROGRESS NOTES
"[facility]  Medication Management Note    Evaluation completed by: Jeff Pettit M.D.   Date of Service: 05/03/22   Appointment type: in-office appointment.    Information below was collected from: patient    Special language or communication needs: No  Responded to any questions about patient rights: No  Reviewed limits of confidentiality: Yes  Confidentiality: The patient was informed that her medical records are confidential except for use by the treatment team in this clinic and others involved in her care.  Records may be shared with outside entities if the patient signs a release of information.  Information may be shared with appropriate authorities without a release of information to report instances of child/elder abuse or if it is determined she is in imminent risk of harm to self or others.     CHIEF COMPLAINT/REASON FOR VISIT  Anxiety and insomnia medication management and follow up    HISTORY OF PRESENT ILLNESS  Lyssa Leblanc is a 41 y.o. old female with h/o depression, PTSD, learning disability, seizure disorder, h/o stroke (patient reports at birth), right spastic hemiplegia who presents today for follow up management of PTSD, and insomnia.   Pt was last seen in 4/19/22, at which time the plan was to continue medications.     Speaking with patient today she presents to appointment in wheeled walker with her father. Patient's father inquires if he should join patient in waiting room and patient insists that her father join her. Patient describes her mood as \"a whole lot better now that I'm not in the wheel chair,\" and patient's father agrees with this report for the most part. Patient notes she is weight bearing now, has improved energy to do things around the house. She reports physical therapy has been going well and that her exercises recently changed with her becoming weight bearing. She reports napping less, while her father notes she has been napping " less/napping earlier and but they both do note that she tires easily at the moment. Patient estimates sleeping 8 hours nightly. Going to bed at approximately 10PM and getting up around 6AM.    Writer discusses with patient and her father that with the medications she is taking including depakote and carbemazapine (patient reports she is no longer taking gabapentin) are very likely contributing to her fatigue (in addition to fatigue associated with deconditioning/increased effort in moving post surgery).     Patient and her father request a referral for [facility] if that is possible due to them considering this as an option that is appropriate after mentioning this option at last visit. Writer discusses with patient and her father providing such a letter at follow up in 2 weeks, while noting that [facility] in the meantime as well.    For writer's future reference such a letter may state:    To whom it may concern,    Due to the complexity of patient's case in terms of biopsychosocial factors, patient would be best served by an organization with greater continuity of care in providers as well as wrap around services under one roof including but not limited to social work/case management, therapy, and medication management.    As such, I have recommended patient establish care with [facility] and this letter represents that recommendation.     Dr. Jeff Pettit, R Psychiatry Resident      Patient denies SI/HI/AVH.    PSYCHOSOCIAL CHANGES SINCE LAST VISIT   Patient's mobility has improved and she is using a wheeled walker presently. None noted, patient does not report difficulties with children. She notes she continues to not get along with her father well, while also noting he helps her with logistics of paying bills. Patient continues to live in duplex with her two children and father lives 1.5 blocks down the street. Patient reports income sources are her social security, her son's social security, as well  "as child support.    CURRENT MEDICATIONS   • Trazodone 150mg PO QHS  • hydroxyzine 50mg PO BID   • Carbamazepine 400mg XR 12HR PO BID (prescribed by neurology)  • Divalproex 1250 PO QHS (prescribed by neurology)   • Meloxicam 7.5mg daily  • Lisinopril 10mg daily    MEDICAL REVIEW OF SYSTEMS  ROS: patient denies nausea, vomiting, chest pain, fever, SOB, cough, difficulties with urination or bowel movements.      ALLERGIES  No Known Allergies       MEDICAL HISTORY  Past Medical History:  7/9/2015: Anemia, iron deficiency  No date: Asthma  2019: Seizure (HCC)  No date: Stroke (HCC)      Comment:  as an infant   Past Surgical History:   Procedure Laterality Date   • [place] LAP,DIAGNOSTIC ABDOMEN N/A 9/2/2020    Procedure: PELVISCOPY;  Surgeon: Garrett Osorio M.D.;  Location: SURGERY [facility];  Service: Gynecology   • SALPINGECTOMY Bilateral 9/2/2020    Procedure: SALPINGECTOMY;  Surgeon: Garrett Osorio M.D.;  Location: SURGERY [facility];  Service: Gynecology   • TUBAL COAGULATION LAPAROSCOPIC BILATERAL         PHYSICAL EXAMINATION  Vital signs: There were no vitals taken for this visit.  Musculoskeletal: gait is mildly improved from last visit, no boot present, less shuffling, walks with wheeled walker  Abnormal movements: none noted    MENTAL STATUS EXAMINATION    General: Lyssa Leblanc appears stated age and exhibits grooming which is casual.  Hygiene is good.     Behavior: Pt is mildly irritable at times but otherwise calm and cooperative with interview.  No apparent distress.  Eye contact is appropriate.   Psychomotor: Psychomotor agitation or retardation Not noted.  Tics or tremors not noted.  Speech: rate within normal limits and volume within normal limits  Language: english  Mood: \"10/10\" \"a whole lot better now that I'm not sitting in a wheelchair\"  Affect: mildly restricted, mildly skewed toward anxious spectrum, mildly to  Irritable, to neutral to near euthymic but appropriately responsive to " conversation  Thought Process: Logical and Goal-directed  Thought Content: denies suicidal ideation, denies homicidal ideation. Within normal limits  Perception: denies auditory hallucinations, denies visual hallucinations. No delusions noted on interview.  Also noted on exam: N/A  Attention span and concentration: appropriate  Orientation: Alert and Fully Oriented  Recent and remote memory: No gross evidence of memory deficits  Insight: Adequate  Judgment: Adequate     Labs:  2/26/21:  Carbamazapine: 10  VPA: 59.4      SAFETY ASSESSMENT - RISK TO SELF  • Current suicide attempts or self harm: No  • Past suicide attempts or self harm: No  • History of suicide by family member: No  • History of suicide by friend/significant other: No  • Recent change in amount/specificity/intensity of suicidal thoughts or self-harm behavior: No  • Ongoing substance use disorder: No  • Current access to firearms, medications, or other identified means of suicide/self-harm: unknown fire arm acces  • If yes, willing to restrict access to means of suicide/self-harm: N/a  • Protective factors present: Yes     SAFETY ASSESSMENT - RISK TO OTHERS  • Current aggressive behavior or risk to others: No  • Past aggressive behavior or risk to others: No  • Recent change in amount/specificity/intensity of thoughts or threats to harm others? No  • Current access to firearms/other identified means of harm? uknown  • If yes, willing to restrict access to weapons/means of harm? N/a     CURRENT RISK ASSESSMENT       Suicide: Low       Homicide: Low       Self-Harm: Low       Relapse: Low       Crisis Safety Plan Reviewed: Will review at follow up    [facility] records  N/A    ASSESSMENT  PTSD vs. Trauma and Other Stressor Related Disorder  Adjustment Disorder vs. Unspecified Mood Disorder  Unspecified Anxiety Disorder  Insomnia    H/o Learning Disability  R/O Intellectrual Disability    Seizure DIsorder    Lyssa Leblanc is a 41 y.o. old female with  h/o depression, PTSD, learning disability, seizure disorder, h/o stroke (patient reports at birth), right spastic hemiplegia who presents today for follow up management of PTSD, and insomnia.    Patient's recent significant tibia fracture with associated open reduction and internal fixation are undoubtedly impacting her mood/anciety and irritability presently as evidenced by improvement with her mood since her mobility has improved. Furthermore patient's history of seizure disorder as well as associated medications for seizure ppx likely have a significant impact on her mood (ie providing mood stabilization in terms of carbemazepine 400mg PO BID and divalproex 1250mg PO daily) as well as associated side effects (such as sedation) however she reports stability on these medications presently and has follow up established with neurology.    Medication changes to help address anxiety and possible associated irritabilty/mood lability in the future may be beneficial however the isolated effect may be limited due to patient's response while on significant doses of 2 mood stabilizing agents and patient's learning disability vs. intellectual disability, and her coping skills or lack thereof that she has developed throughout her lifetime. Furthermore assessment of efficacy and efficacy in and of itself is presently limited due to stressor of fracture and surgery. Will continue to assess appropriateness of adding medications at follow up visits and plan to follow up with patient in 2 weeks.    Will continue to discuss transition to [facility] at future follow up visits when appropriate based on patient's stability.    Patient's social circumstances have otherwise not changed and she has stable income in her social security that she receives (as well as her son's).    DIAGNOSES/PLAN  Problem 1: Insomnia - improving  Status: deteriorated  • Medications: continue trazodone 150mg PO QHS PRN for insomnia   • Psychotherapy: presently  going to therapy through [facility]  • Labs/studies: none  • Other: none    Problem 2: Unspecified Anxiety Disorder  Status: Unchanged, present but responsive to medication  • Medications: hydroxyzine 50mg PO BID (changed to PRN)  • Psychotherapy: attending therapy through [facility]  • Labs/studies: none  • Other: none    • Medication options, alternatives (including no medications) and medication risks/benefits/side effects were discussed in detail.  • The patient was advised to call, message clinician on [employer], or come in to the clinic if symptoms worsen or if questions/issues regarding their medications arise.  The patient verbalized understanding and agreement.    • The patient was educated to call 911, call the suicide hotline, or go to the local ER if having thoughts of suicide or homicide.  The patient verbalized understanding and agreement.   • The proposed treatment plan was discussed with the patient who was provided the opportunity to ask questions and make suggestions regarding alternative treatment. Patient verbalized understanding and expressed agreement with the plan.      Return to clinic in 2 weeks or sooner if symptoms worsen.    This appointment was supervised by attending psychiatrist, Misbah López MD, who agrees with assessment and treatment plan.  See attending attestation for more details.       Jeff Pettit M.D.  05/03/22

## 2022-05-07 DIAGNOSIS — R56.9 SEIZURES (HCC): ICD-10-CM

## 2022-05-07 DIAGNOSIS — G40.909 SEIZURE DISORDER (HCC): ICD-10-CM

## 2022-05-08 DIAGNOSIS — M25.522 LEFT ELBOW PAIN: ICD-10-CM

## 2022-05-09 RX ORDER — MELOXICAM 7.5 MG/1
7.5 TABLET ORAL DAILY
Qty: 30 TABLET | Refills: 0 | Status: SHIPPED | OUTPATIENT
Start: 2022-05-09 | End: 2022-06-06

## 2022-05-09 RX ORDER — DIVALPROEX SODIUM 250 MG
TABLET, EXTENDED RELEASE 24 HR ORAL
Qty: 450 TABLET | Refills: 1 | Status: SHIPPED | OUTPATIENT
Start: 2022-05-09 | End: 2022-08-15 | Stop reason: SDUPTHER

## 2022-05-09 NOTE — TELEPHONE ENCOUNTER
Received request via: Pharmacy    Was the patient seen in the last year in this department? Yes    LV: 12/13/21  FV: 6/15/22    Does the patient have an active prescription (recently filled or refills available) for medication(s) requested? Yes.

## 2022-05-10 ENCOUNTER — TELEPHONE (OUTPATIENT)
Dept: MEDICAL GROUP | Facility: MEDICAL CENTER | Age: 42
End: 2022-05-10
Payer: MEDICARE

## 2022-05-10 NOTE — TELEPHONE ENCOUNTER
"·  paperwork received from [facility] requiring provider signature.     · All appropriate fields completed by Medical Assistant: Yes    · Paperwork placed in \"MA to Provider\" folder/basket. Awaiting provider completion/signature.  "

## 2022-05-16 DIAGNOSIS — Z00.00 HEALTH CARE MAINTENANCE: ICD-10-CM

## 2022-05-17 ENCOUNTER — OFFICE VISIT (OUTPATIENT)
Dept: BEHAVIORAL HEALTH | Facility: PSYCHIATRIC FACILITY | Age: 42
End: 2022-05-17
Payer: MEDICARE

## 2022-05-17 DIAGNOSIS — F43.10 POST TRAUMATIC STRESS DISORDER: ICD-10-CM

## 2022-05-17 PROCEDURE — 99214 OFFICE O/P EST MOD 30 MIN: CPT | Mod: GC | Performed by: STUDENT IN AN ORGANIZED HEALTH CARE EDUCATION/TRAINING PROGRAM

## 2022-05-17 NOTE — LETTER
May 17, 2022       To whom it may concern,     Due to the complexity of patient's case in terms of biopsychosocial factors, patient would be best served by an organization with greater continuity of care in providers as well as wrap around services under one roof including but not limited to social work/case management, psychotherapy, primary care services, as well as neurological and psychiatric medication management.     As such, I have recommended patient establish care with [facility] and this letter represents that recommendation.     Dr. Jeff Pettit MD, [employer] Psychiatry Resident

## 2022-05-17 NOTE — PROGRESS NOTES
"[facility]  Medication Management Note    Evaluation completed by: Jeff Pettit M.D.   Date of Service: 05/17/22   Appointment type: in-office appointment.    Information below was collected from: patient    Special language or communication needs: No  Responded to any questions about patient rights: No  Reviewed limits of confidentiality: Yes  Confidentiality: The patient was informed that her medical records are confidential except for use by the treatment team in this clinic and others involved in her care.  Records may be shared with outside entities if the patient signs a release of information.  Information may be shared with appropriate authorities without a release of information to report instances of child/elder abuse or if it is determined she is in imminent risk of harm to self or others.     CHIEF COMPLAINT/REASON FOR VISIT  Anxiety and insomnia medication management and follow up    HISTORY OF PRESENT ILLNESS  Lyssa Leblanc is a 41 y.o. old female with h/o depression, PTSD, learning disability, seizure disorder, h/o stroke (patient reports at birth), right spastic hemiplegia who presents today for follow up management of PTSD, and insomnia.   Pt was last seen in 5/3/22, at which time the plan was to continue medications.     Speaking with patient today she presents to appointment with wheeled walker and patient expresses to her father today that she does not need him to accompany her to appointment today as he has for her last 2 appointments. Patient is able to walk significantly quicker down the hopper today while using wheeled walker.    Speaking with patient today she notes significantly improved mood describing her mood as \"good,\" and rating her mood \"9 or 10\"/10 with 10 being best mood. She notes improvement in her ability to ambulate, that she has been using her walker to walk around [employer] as advised by her other providers and she notes that she has not needed " "the walker to ambulate around her home, noting she takes breaks using her couch to sit when she needs to do so. She denies leg pain, only noting that her knee pops intermittently which she has been informed is normal. She continues with physical therapy and notes it has been going well.    Patient states her anxiety has been \"fine,\" noting her irritability is mostly related to interactions with her 14yo son whom she states, \"likes to nag,\" whilst laughing retelling this information and that their interactions are most strained on Sundays when he is impatient about going to his stepfather's. Patient denies other situations she can readily identify as anxiety provoking or making her irritable with regularity. Patient notes that her sleep has been \"fine\" that she goes to sleep around 1030 each night and awakens around 6AM to get her kids up and ready for school. She notes that she is not engaged with therapy through / she had been seeing previously.    Patient inquires about a letter for referral to [facility] which writer provides while also instructing patient that it would be prudent, and likely allow her to be seen earlier, if she showed up to the clinic in person and asked to be seen.    Patient denies SI/HI/AVH.    PSYCHOSOCIAL CHANGES SINCE LAST VISIT   Patient's mobility has improved and she is using a wheeled walker presently.Patients relationship with her father seems to have improved to an extent with him being present to help her to get to medical appointments, and previously noting he helps her with logistics of paying bills. Patient continues to live in duplex with her two children and father lives 1.5 blocks down the street. Patient reports income sources are her social security, her son's social security, as well as child support.    CURRENT MEDICATIONS   • Trazodone 150mg PO QHS  • hydroxyzine 50mg PO BID PRN  • Carbamazepine 400mg XR 12HR PO BID (prescribed by neurology) " "  • Divalproex 1250 PO QHS (prescribed by neurology)   • Meloxicam 7.5mg daily  • Lisinopril 10mg daily    MEDICAL REVIEW OF SYSTEMS  ROS: patient denies nausea, vomiting, chest pain, fever, SOB, cough, difficulties with urination or bowel movements.      ALLERGIES  No Known Allergies       MEDICAL HISTORY  Past Medical History:  7/9/2015: Anemia, iron deficiency  No date: Asthma  2019: Seizure (HCC)  No date: Stroke (HCC)      Comment:  as an infant   Past Surgical History:   Procedure Laterality Date   • [place] LAP,DIAGNOSTIC ABDOMEN N/A 9/2/2020    Procedure: PELVISCOPY;  Surgeon: Garrett Osorio M.D.;  Location: SURGERY [facility];  Service: Gynecology   • SALPINGECTOMY Bilateral 9/2/2020    Procedure: SALPINGECTOMY;  Surgeon: Garrett Osorio M.D.;  Location: SURGERY [facility];  Service: Gynecology   • TUBAL COAGULATION LAPAROSCOPIC BILATERAL         PHYSICAL EXAMINATION  Vital signs: There were no vitals taken for this visit.  Musculoskeletal: gait is mildly improved from last visit, no boot present, less shuffling, walks with wheeled walker  Abnormal movements: none noted    MENTAL STATUS EXAMINATION    General: Lyssa Leblanc appears stated age and exhibits grooming which is casual.  Hygiene is good.     Behavior: Pt is mildly irritable at times but otherwise calm and cooperative with interview.  No apparent distress.  Eye contact is appropriate.   Psychomotor: Improved ability to ambulate with wheeled walker. Psychomotor agitation or retardation Not noted.  Tics or tremors not noted.  Speech: rate within normal limits and volume within normal limits  Language: english  Mood: \"9 or 10/10\" \"good\"  Affect: mildly restricted, mildly skewed toward anxious spectrum,  neutral to near euthymic to euthymic and appropriately responsive to conversation  Thought Process: linear Logical and Goal-directed  Thought Content: denies suicidal ideation, denies homicidal ideation. Within normal limits  Perception: " denies auditory hallucinations, denies visual hallucinations. No delusions noted on interview.  Also noted on exam: N/A  Attention span and concentration: appropriate  Orientation: Alert and Fully Oriented  Recent and remote memory: No gross evidence of memory deficits  Insight: Adequate  Judgment: Adequate     Labs:  4/20/22:  RBC: 4 (L)  HGB: 11.7 (L)  Hematocrit: 36.6 (L)  MCHC: 32.0 (L)  Platelet Count: 145 (L)  MPV: 13.7 (H)    Neutrophils-Polys: 41.50 (L)  EOS: 11.5 (H)  Basos: 0.69 (H)    Total Iron Binding Capacity: 244 (L)    2/26/21:  Carbamazapine: 10  VPA: 59.4      SAFETY ASSESSMENT - RISK TO SELF  • Current suicide attempts or self harm: No  • Past suicide attempts or self harm: No  • History of suicide by family member: No  • History of suicide by friend/significant other: No  • Recent change in amount/specificity/intensity of suicidal thoughts or self-harm behavior: No  • Ongoing substance use disorder: No  • Current access to firearms, medications, or other identified means of suicide/self-harm: unknown fire arm acces  • If yes, willing to restrict access to means of suicide/self-harm: N/a  • Protective factors present: Yes     SAFETY ASSESSMENT - RISK TO OTHERS  • Current aggressive behavior or risk to others: No  • Past aggressive behavior or risk to others: No  • Recent change in amount/specificity/intensity of thoughts or threats to harm others? No  • Current access to firearms/other identified means of harm? uknown  • If yes, willing to restrict access to weapons/means of harm? N/a     CURRENT RISK ASSESSMENT       Suicide: Low       Homicide: Low       Self-Harm: Low       Relapse: Low       Crisis Safety Plan Reviewed: Will review at follow up    [place]  records  N/A    ASSESSMENT  PTSD vs. Trauma and Other Stressor Related Disorder  Adjustment Disorder vs. Unspecified Mood Disorder  Unspecified Anxiety Disorder  Insomnia    H/o Learning Disability  R/O Intellectrual Disability    Seizure  DIsorder    Lyssa Leblanc is a 41 y.o. old female with h/o depression, PTSD, learning disability, seizure disorder, h/o stroke (patient reports at birth), right spastic hemiplegia who presents today for follow up management of PTSD, and insomnia.    Patient's recent significant tibia fracture with associated open reduction and internal fixation are undoubtedly impacting her mood/anciety and irritability presently as evidenced by improvement with her mood since her mobility has improved. Furthermore patient's history of seizure disorder as well as associated medications for seizure ppx likely have a significant impact on her mood (ie providing mood stabilization in terms of carbemazepine 400mg PO BID and divalproex 1250mg PO daily) as well as associated side effects (such as sedation) however she reports stability on these medications presently and has follow up established with neurology.    Medication changes to help address anxiety and possible associated irritabilty/mood lability in the future may be beneficial however the isolated effect may be limited due to patient's response while on significant doses of 2 mood stabilizing agents and patient's learning disability vs. intellectual disability, and her coping skills or lack thereof that she has developed throughout her lifetime. Furthermore assessment of efficacy and efficacy in and of itself is presently limited due to stressor of fracture and surgery. Will continue to assess appropriateness of adding medications at follow up visits and plan to follow up with patient in 2 weeks. As patient has progressed her mood and affect have improved significantly suggesting this was the best course of action at this time and that there is not a need presently for additional medications.    Will continue to discuss transition to [facility] at future follow up visits when appropriate based on patient's stability.    Patient's social circumstances have otherwise not  changed and she has stable income in her social security that she receives (as well as her son's).    DIAGNOSES/PLAN  Problem 1: Insomnia - improving  Status: deteriorated  • Medications: continue trazodone 150mg PO QHS PRN for insomnia   • Psychotherapy: presently going to therapy through [facility]  • Labs/studies: none  • Other: none    Problem 2: Unspecified Anxiety Disorder  Status: Unchanged, present but responsive to medication  • Medications: hydroxyzine 50mg PO BID PRN  • Psychotherapy: none presently  • Labs/studies: none  • Other: none    • Medication options, alternatives (including no medications) and medication risks/benefits/side effects were discussed in detail.  • The patient was advised to call, message clinician on [employer], or come in to the clinic if symptoms worsen or if questions/issues regarding their medications arise.  The patient verbalized understanding and agreement.    • The patient was educated to call 911, call the suicide hotline, or go to the local ER if having thoughts of suicide or homicide.  The patient verbalized understanding and agreement.   • The proposed treatment plan was discussed with the patient who was provided the opportunity to ask questions and make suggestions regarding alternative treatment. Patient verbalized understanding and expressed agreement with the plan.      Return to clinic in 2 weeks or sooner if symptoms worsen.    This appointment was supervised by attending psychiatrist, Misbah López MD, who agrees with assessment and treatment plan.  See attending attestation for more details.       Jeff Pettit M.D.  05/17/22

## 2022-05-19 ENCOUNTER — TELEPHONE (OUTPATIENT)
Dept: MEDICAL GROUP | Facility: MEDICAL CENTER | Age: 42
End: 2022-05-19
Payer: MEDICARE

## 2022-05-19 NOTE — TELEPHONE ENCOUNTER
"· paperwork received from home health requiring provider signature.     · All appropriate fields completed by Medical Assistant: Yes    · Paperwork placed in \"MA to Provider\" folder/basket. Awaiting provider completion/signature.  "

## 2022-05-21 RX ORDER — TRAZODONE HYDROCHLORIDE 50 MG/1
150 TABLET ORAL NIGHTLY
Qty: 30 TABLET | Refills: 3 | Status: SHIPPED | OUTPATIENT
Start: 2022-05-21 | End: 2022-06-10

## 2022-05-21 RX ORDER — HYDROXYZINE 50 MG/1
50 TABLET, FILM COATED ORAL 2 TIMES DAILY PRN
Qty: 60 TABLET | Refills: 2 | Status: SHIPPED | OUTPATIENT
Start: 2022-05-21

## 2022-05-31 ENCOUNTER — TELEPHONE (OUTPATIENT)
Dept: MEDICAL GROUP | Facility: MEDICAL CENTER | Age: 42
End: 2022-05-31
Payer: MEDICARE

## 2022-05-31 NOTE — TELEPHONE ENCOUNTER
"·  paperwork received from [facility] requiring provider signature.     · All appropriate fields completed by Medical Assistant: Yes    · Paperwork placed in \"MA to Provider\" folder/basket. Awaiting provider completion/signature.  "

## 2022-06-06 DIAGNOSIS — M25.522 LEFT ELBOW PAIN: ICD-10-CM

## 2022-06-06 RX ORDER — MELOXICAM 7.5 MG/1
7.5 TABLET ORAL DAILY
Qty: 30 TABLET | Refills: 0 | Status: SHIPPED | OUTPATIENT
Start: 2022-06-06 | End: 2022-10-24

## 2022-06-15 ENCOUNTER — APPOINTMENT (OUTPATIENT)
Dept: NEUROLOGY | Facility: MEDICAL CENTER | Age: 42
End: 2022-06-15
Attending: STUDENT IN AN ORGANIZED HEALTH CARE EDUCATION/TRAINING PROGRAM
Payer: MEDICARE

## 2022-08-10 ENCOUNTER — HOSPITAL ENCOUNTER (OUTPATIENT)
Dept: LAB | Facility: MEDICAL CENTER | Age: 42
End: 2022-08-10
Attending: NURSE PRACTITIONER
Payer: MEDICARE

## 2022-08-10 LAB
BASOPHILS # BLD AUTO: 0.8 % (ref 0–1.8)
BASOPHILS # BLD: 0.04 K/UL (ref 0–0.12)
EOSINOPHIL # BLD AUTO: 0.07 K/UL (ref 0–0.51)
EOSINOPHIL NFR BLD: 1.4 % (ref 0–6.9)
ERYTHROCYTE [DISTWIDTH] IN BLOOD BY AUTOMATED COUNT: 47.7 FL (ref 35.9–50)
FERRITIN SERPL-MCNC: 120 NG/ML (ref 10–291)
FOLATE SERPL-MCNC: >40 NG/ML
HCT VFR BLD AUTO: 33.3 % (ref 37–47)
HGB BLD-MCNC: 10.6 G/DL (ref 12–16)
IMM GRANULOCYTES # BLD AUTO: 0.01 K/UL (ref 0–0.11)
IMM GRANULOCYTES NFR BLD AUTO: 0.2 % (ref 0–0.9)
IRON SATN MFR SERPL: 28 % (ref 15–55)
IRON SERPL-MCNC: 76 UG/DL (ref 40–170)
LYMPHOCYTES # BLD AUTO: 1.78 K/UL (ref 1–4.8)
LYMPHOCYTES NFR BLD: 36.3 % (ref 22–41)
MCH RBC QN AUTO: 30.4 PG (ref 27–33)
MCHC RBC AUTO-ENTMCNC: 31.8 G/DL (ref 33.6–35)
MCV RBC AUTO: 95.4 FL (ref 81.4–97.8)
MONOCYTES # BLD AUTO: 0.45 K/UL (ref 0–0.85)
MONOCYTES NFR BLD AUTO: 9.2 % (ref 0–13.4)
NEUTROPHILS # BLD AUTO: 2.56 K/UL (ref 2–7.15)
NEUTROPHILS NFR BLD: 52.1 % (ref 44–72)
NRBC # BLD AUTO: 0 K/UL
NRBC BLD-RTO: 0 /100 WBC
PLATELET # BLD AUTO: 183 K/UL (ref 164–446)
PMV BLD AUTO: 13.6 FL (ref 9–12.9)
RBC # BLD AUTO: 3.49 M/UL (ref 4.2–5.4)
TIBC SERPL-MCNC: 268 UG/DL (ref 250–450)
UIBC SERPL-MCNC: 192 UG/DL (ref 110–370)
VIT B12 SERPL-MCNC: 586 PG/ML (ref 211–911)
WBC # BLD AUTO: 4.9 K/UL (ref 4.8–10.8)

## 2022-08-10 PROCEDURE — 36415 COLL VENOUS BLD VENIPUNCTURE: CPT

## 2022-08-10 PROCEDURE — 83540 ASSAY OF IRON: CPT

## 2022-08-10 PROCEDURE — 83550 IRON BINDING TEST: CPT

## 2022-08-10 PROCEDURE — 82607 VITAMIN B-12: CPT

## 2022-08-10 PROCEDURE — 85025 COMPLETE CBC W/AUTO DIFF WBC: CPT

## 2022-08-10 PROCEDURE — 82746 ASSAY OF FOLIC ACID SERUM: CPT

## 2022-08-10 PROCEDURE — 82728 ASSAY OF FERRITIN: CPT

## 2022-08-15 ENCOUNTER — OFFICE VISIT (OUTPATIENT)
Dept: NEUROLOGY | Facility: MEDICAL CENTER | Age: 42
End: 2022-08-15
Attending: STUDENT IN AN ORGANIZED HEALTH CARE EDUCATION/TRAINING PROGRAM
Payer: MEDICARE

## 2022-08-15 VITALS
DIASTOLIC BLOOD PRESSURE: 76 MMHG | RESPIRATION RATE: 16 BRPM | HEIGHT: 67 IN | OXYGEN SATURATION: 98 % | TEMPERATURE: 97.9 F | WEIGHT: 186.29 LBS | SYSTOLIC BLOOD PRESSURE: 102 MMHG | BODY MASS INDEX: 29.24 KG/M2 | HEART RATE: 78 BPM

## 2022-08-15 DIAGNOSIS — G40.209 LOCALIZATION-RELATED FOCAL EPILEPSY WITH COMPLEX PARTIAL SEIZURES (HCC): ICD-10-CM

## 2022-08-15 DIAGNOSIS — R79.89 LOW VITAMIN D LEVEL: ICD-10-CM

## 2022-08-15 DIAGNOSIS — R56.9 SEIZURES (HCC): ICD-10-CM

## 2022-08-15 DIAGNOSIS — G40.909 SEIZURE DISORDER (HCC): ICD-10-CM

## 2022-08-15 DIAGNOSIS — G81.11 RIGHT SPASTIC HEMIPLEGIA (HCC): ICD-10-CM

## 2022-08-15 DIAGNOSIS — Z86.73 HISTORY OF STROKE: ICD-10-CM

## 2022-08-15 PROCEDURE — 99212 OFFICE O/P EST SF 10 MIN: CPT | Performed by: STUDENT IN AN ORGANIZED HEALTH CARE EDUCATION/TRAINING PROGRAM

## 2022-08-15 PROCEDURE — 99213 OFFICE O/P EST LOW 20 MIN: CPT | Performed by: STUDENT IN AN ORGANIZED HEALTH CARE EDUCATION/TRAINING PROGRAM

## 2022-08-15 RX ORDER — DIVALPROEX SODIUM 250 MG
TABLET, EXTENDED RELEASE 24 HR ORAL
Qty: 450 TABLET | Refills: 3 | Status: SHIPPED | OUTPATIENT
Start: 2022-08-15 | End: 2023-03-02 | Stop reason: SDUPTHER

## 2022-08-15 RX ORDER — CARBAMAZEPINE 400 MG/1
400 TABLET, EXTENDED RELEASE ORAL 2 TIMES DAILY
Qty: 180 TABLET | Refills: 3 | Status: SHIPPED | OUTPATIENT
Start: 2022-08-15 | End: 2023-11-29

## 2022-08-15 ASSESSMENT — FIBROSIS 4 INDEX: FIB4 SCORE: 0.57

## 2022-08-15 NOTE — PROGRESS NOTES
"NEUROLOGY FOLLOW-UP - 08/15/2022       PCP  [facility]AN[facility]   [phone]   DUPLICATE [[id number]]     REASON FOR VISIT: Lyssa Leblanc 41 y.o. female presents today for follow-up.       INTERVAL HISTORY:  No seizures since last visit. Last seizure over a year. Patient is overall doing well. Mood is good. Mood is in fact better now she is on abilify    ASM:  Depakote 1250 mg 24 ER QHS  Tegretol  mg BID    rEEG 4, 2019:  INTERPRETATION:  This is an abnormal video EEG recording in the awake state.  There is slowing and attenuation in the left cerebral hemisphere, likely suggestive of an underlying structural abnormality. No seizures captured during this study.  Clinical and radiological correlation is recommended.    Patient's PMH, PSH, FH, and SH were reviewed.    ROS: All review of systems complete and are negative except as documented    CURRENT MEDICATIONS AT THE TIME OF THIS ENCOUNTER:    Current Outpatient Medications:     Depakote ER, TAKE 5 TABLETS BY MOUTH AT BEDTIME (NAME BRAND MEDICALLY NECESSARY).    carBAMazepine SR, 400 mg, Oral, BID    Calcium Carbonate (CALCIUM 500 PO), , Taking    atorvastatin, , Taking    ARIPiprazole, 2 times a day., Taking    mirtazapine, , Taking    meloxicam, 7.5 mg, Oral, DAILY, Taking    hydrOXYzine HCl, 50 mg, Oral, BID PRN, Taking    lisinopril, TAKE 1 TABLET BY MOUTH EVERY DAY IN THE MORNING, Taking    VITAMIN D PO, 1 Tablet, Oral, DAILY, Taking    FOLIC ACID PO, 1 Tablet, Oral, DAILY, Taking     EXAM:   Encounter Vitals  /76   Pulse 78   Temp 36.6 °C (97.9 °F) (Temporal)   Resp 16   Ht 1.702 m (5' 7\")   Wt 84.5 kg (186 lb 4.6 oz)   SpO2 98%            Physical Exam:  Physical Exam  Vitals reviewed.   Constitutional:       Appearance: Normal appearance.   HENT:      Nose: Nose normal.      Mouth/Throat:      Mouth: Mucous membranes are dry.      Pharynx: Oropharynx is clear. No oropharyngeal exudate or posterior oropharyngeal erythema.   Eyes:      " General: Lids are normal.         Right eye: No discharge.         Left eye: No discharge.      Extraocular Movements: Extraocular movements intact.      Pupils: Pupils are equal, round, and reactive to light.   Pulmonary:      Effort: No respiratory distress.      Breath sounds: No stridor. No wheezing or rhonchi.   Musculoskeletal:         General: No swelling, tenderness or deformity.      Cervical back: Normal range of motion.      Comments: S/p R knee surgery. Healed scars from surgery\     Skin:     Coloration: Skin is not jaundiced.      Findings: No bruising, erythema, lesion or rash.      Neurological Exam   Neurological Exam  Mental Status  Awake, alert and oriented to person, place and time. Oriented to person, place and time.    Cranial Nerves  CN II: Right funduscopic exam: not visualized. Left funduscopic exam: not visualized.  CN III, IV, VI: Extraocular movements intact bilaterally. Normal lids and orbits bilaterally. Pupils equal round and reactive to light bilaterally.  CN V: Facial sensation is normal.  CN VII: Full and symmetric facial movement.  CN IX, X: Palate elevates symmetrically    Motor   Increased muscle tone. RUE. Strength is 5/5 in all four extremities except as noted.  Right UE weakness 4/5 proximal muscles, 3/5 distal muscle secondary to stroke as a child.    Coordination  Right: Finger-to-nose normal.Left: Finger-to-nose normal.    Gait  Casual gait: Antalgic gait.     ALL DATA (I.e. labs, procedures, imaging, reports, clinical notes, etc.) FROM [facility] AND/OR OUTSIDE SOURCES, IF AVAILABLE, PERSONALLY REVIEWED:       ASSESSMENT, EDUCATION, AND COUNSELING:  This is a 41 y.o. female patient who presents to the neurology clinic. We had an extensive discussion about the patient's symptoms, signs, and work-up to date, if any. We discussed potential and/or definitive diagnoses, work-up, and potential treatments.       PLAN:  If applicable, the work-up such as labs, imaging, procedures,  and/or other testing, referrals, and/or recommended treatment strategies are listed below.    Visit Diagnoses     ICD-10-CM   1. Seizure disorder (HCC)  G40.909   2. 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 History of stroke  Z86.73   4. Right spastic hemiplegia (HCC)  G81.11   5. Low vitamin D level  R79.89   6. Seizures (HCC)  R56.9        Orders Placed This Encounter    DEPAKOTE  MG TABLET SR 24 HR    carBAMazepine SR (TEGRETOL XR) 400 MG TABLET SR 12 HR          Patient with likely localization epilepsy secondary to ischemic stroke as a child. Seizures stable on current regimen of Depakote ER 1250 mg QHS and Tegretol  mg BID. No seizure > 2.5 years. C/w folic acid along with vitamin D.F/u in 1 year at which point we will plan for updated labs,    BILLING DOCUMENTATION:     I spent a total of I spent a total of 25 minutes on the day of the visit.    minutes of face-to-face time in this visit. Over 50% of the time of the visit today was spent on counseling and/or coordination of care wtih the patient and/or family, as above in assessment in plan.    Shiva Jackson MD  Epilepsy and General Neurology  Department of Neurology  Clinical  of Neurology [employer].

## 2022-08-15 NOTE — PATIENT INSTRUCTIONS
Neurology Clinic Visit     IMPORTANT: If imaging, procedure(s), AND/or referrals were placed for you:  Contact [facility] Scheduling if you have not heard from them in 5 day: [phone]    Outpatient [facility] Imaging Sites.  [address]  Mon-Fri 8am-5pm   [address] (Breast Center) Mon-Fri 7am-4pm    [address]  Mon-Fri 8am-5pm  [facility] Radiology 7am-6:30pm  [address] [facility] Mon-Fri 8am-7pm  Sat 9am-6pm   [address]  Mon-Fri 8am-7pm and Sat/Sun 9am-5pm  [address]  Mon-Fri 8am-5pm  [address] (PET/CT)  Mon-Fri 8:30am-4:30pm     If labs were ordered for you:  To Schedule an appointment for lab services, please call [phone] or visit [email or web address].  [facility] Lab Services Convenient Locations:    [place]: [address] (Ground Floor)  [address] [address] [facility] (Admitting Entrance)  [address]  [address]  [address] [facility], [address]  [address] [facility]  [address]: [address]  [address]       [place]: [address] [place] [address] Dr  [address]     [facility]     [address] [place] [address]     Laboratory Hours: 6:30am - 6pm  Monday - Friday,   7am - 2pm Saturday    [facility]      [address] [facility] [place] [address]      Laboratory Hours:  7:30am - 4pm  Monday - Friday,  9am - 3pm Saturday    [facility]     [address]    [place] [address]      Laboratory Hours:  7:00am - 5:30pm  Monday - Friday    [facility]      [address] [place] [address]       Laboratory Hours:  7:30am - 4:30pm  Monday - Friday    [facility]       [address] [place] [address]       Laboratory Hours:  8am - 5pm  Monday - Friday    [facility]       [address] [place] [address]       Laboratory hours:  7:30am - 4pm   Monday - Friday    GENERAL REMINDERS:  Request refills 1 week in advance to ensure you do not run out of medications  All diagnostic study results will be reviewed at your next visit, UNLESS there are urgent results that need to be acted on sooner.  Please remember that it is the your responsibility to check with your Insurance for benefit coverage for visit / visits.  24 hours notice is required for all appointment changes or cancellation.  Please arrive 20 min. before your appointment time  Please note that because Dr. Jackson has high daily clinic volume, he is unable to accommodate late arrivals. If you are more than 15 minutes late for the scheduled appointment you will be asked to reschedule  Please bring the following with you:  1) Picture ID  2) Insurance card  3) An updated list of ALL your medications and their dosages (prescribed medications, over the counter medications, and all supplements), as well as allergies with you at all times  4) A list of questions, concerns, comments that you wish to discuss with Dr. Manuel Jackson MD  General Neurologist and Epileptologist  Department of Neurology  Instructor of Clinical Neurology [facility].

## 2022-09-09 ENCOUNTER — HOSPITAL ENCOUNTER (OUTPATIENT)
Dept: LAB | Facility: MEDICAL CENTER | Age: 42
End: 2022-09-09
Attending: NURSE PRACTITIONER
Payer: MEDICARE

## 2022-09-09 LAB
ALBUMIN SERPL BCP-MCNC: 3.8 G/DL (ref 3.2–4.9)
ALBUMIN/GLOB SERPL: 1.6 G/DL
ALP SERPL-CCNC: 66 U/L (ref 30–99)
ALT SERPL-CCNC: 5 U/L (ref 2–50)
ANION GAP SERPL CALC-SCNC: 7 MMOL/L (ref 7–16)
AST SERPL-CCNC: 7 U/L (ref 12–45)
BASOPHILS # BLD AUTO: 1 % (ref 0–1.8)
BASOPHILS # BLD: 0.04 K/UL (ref 0–0.12)
BILIRUB SERPL-MCNC: <0.2 MG/DL (ref 0.1–1.5)
BUN SERPL-MCNC: 10 MG/DL (ref 8–22)
CALCIUM SERPL-MCNC: 8.5 MG/DL (ref 8.5–10.5)
CHLORIDE SERPL-SCNC: 111 MMOL/L (ref 96–112)
CO2 SERPL-SCNC: 26 MMOL/L (ref 20–33)
CREAT SERPL-MCNC: 0.91 MG/DL (ref 0.5–1.4)
CRP SERPL HS-MCNC: <0.3 MG/DL (ref 0–0.75)
EOSINOPHIL # BLD AUTO: 0.09 K/UL (ref 0–0.51)
EOSINOPHIL NFR BLD: 2.2 % (ref 0–6.9)
ERYTHROCYTE [DISTWIDTH] IN BLOOD BY AUTOMATED COUNT: 43.2 FL (ref 35.9–50)
ERYTHROCYTE [SEDIMENTATION RATE] IN BLOOD BY WESTERGREN METHOD: 11 MM/HOUR (ref 0–25)
GFR SERPLBLD CREATININE-BSD FMLA CKD-EPI: 81 ML/MIN/1.73 M 2
GLOBULIN SER CALC-MCNC: 2.4 G/DL (ref 1.9–3.5)
GLUCOSE SERPL-MCNC: 78 MG/DL (ref 65–99)
HCT VFR BLD AUTO: 32.7 % (ref 37–47)
HGB BLD-MCNC: 10.5 G/DL (ref 12–16)
IMM GRANULOCYTES # BLD AUTO: 0.02 K/UL (ref 0–0.11)
IMM GRANULOCYTES NFR BLD AUTO: 0.5 % (ref 0–0.9)
IRON SATN MFR SERPL: 37 % (ref 15–55)
IRON SERPL-MCNC: 77 UG/DL (ref 40–170)
LYMPHOCYTES # BLD AUTO: 1.66 K/UL (ref 1–4.8)
LYMPHOCYTES NFR BLD: 40.7 % (ref 22–41)
MCH RBC QN AUTO: 30.8 PG (ref 27–33)
MCHC RBC AUTO-ENTMCNC: 32.1 G/DL (ref 33.6–35)
MCV RBC AUTO: 95.9 FL (ref 81.4–97.8)
MONOCYTES # BLD AUTO: 0.26 K/UL (ref 0–0.85)
MONOCYTES NFR BLD AUTO: 6.4 % (ref 0–13.4)
NEUTROPHILS # BLD AUTO: 2.01 K/UL (ref 2–7.15)
NEUTROPHILS NFR BLD: 49.2 % (ref 44–72)
NRBC # BLD AUTO: 0 K/UL
NRBC BLD-RTO: 0 /100 WBC
PLATELET # BLD AUTO: 154 K/UL (ref 164–446)
PMV BLD AUTO: 13.3 FL (ref 9–12.9)
POTASSIUM SERPL-SCNC: 3.3 MMOL/L (ref 3.6–5.5)
PROT SERPL-MCNC: 6.2 G/DL (ref 6–8.2)
RBC # BLD AUTO: 3.41 M/UL (ref 4.2–5.4)
SODIUM SERPL-SCNC: 144 MMOL/L (ref 135–145)
TIBC SERPL-MCNC: 208 UG/DL (ref 250–450)
TSH SERPL DL<=0.005 MIU/L-ACNC: 2.96 UIU/ML (ref 0.38–5.33)
UIBC SERPL-MCNC: 131 UG/DL (ref 110–370)
WBC # BLD AUTO: 4.1 K/UL (ref 4.8–10.8)

## 2022-09-09 PROCEDURE — 85652 RBC SED RATE AUTOMATED: CPT

## 2022-09-09 PROCEDURE — 86635 COCCIDIOIDES ANTIBODY: CPT

## 2022-09-09 PROCEDURE — 85025 COMPLETE CBC W/AUTO DIFF WBC: CPT

## 2022-09-09 PROCEDURE — 83550 IRON BINDING TEST: CPT

## 2022-09-09 PROCEDURE — 84443 ASSAY THYROID STIM HORMONE: CPT

## 2022-09-09 PROCEDURE — 80053 COMPREHEN METABOLIC PANEL: CPT

## 2022-09-09 PROCEDURE — 83540 ASSAY OF IRON: CPT

## 2022-09-09 PROCEDURE — 86140 C-REACTIVE PROTEIN: CPT

## 2022-09-09 PROCEDURE — 36415 COLL VENOUS BLD VENIPUNCTURE: CPT

## 2022-09-13 ENCOUNTER — RESEARCH ENCOUNTER (OUTPATIENT)
Dept: RESEARCH | Facility: WORKSITE | Age: 42
End: 2022-09-13
Payer: MEDICARE

## 2022-09-13 DIAGNOSIS — Z00.6 RESEARCH STUDY PATIENT: ICD-10-CM

## 2022-09-14 LAB
C IMMITIS AB SER QL ID: NOT DETECTED
COCCIDIOIDES AB TITR SER CF: NORMAL {TITER}
COCCIDIOIDES IGG SER-ACNC: 0.4 IV
COCCIDIOIDES IGM SERPL-ACNC: 0.1 IV

## 2022-10-03 ENCOUNTER — HOSPITAL ENCOUNTER (OUTPATIENT)
Dept: LAB | Facility: MEDICAL CENTER | Age: 42
End: 2022-10-03
Attending: NURSE PRACTITIONER
Payer: MEDICARE

## 2022-10-03 ENCOUNTER — HOSPITAL ENCOUNTER (OUTPATIENT)
Facility: MEDICAL CENTER | Age: 42
End: 2022-10-03
Attending: NURSE PRACTITIONER
Payer: MEDICARE

## 2022-10-03 LAB
HGB RETIC QN AUTO: 31.4 PG/CELL (ref 29–35)
IMM RETICS NFR: 20.6 % (ref 9.3–17.4)
RETICS # AUTO: 0.08 M/UL (ref 0.04–0.06)
RETICS/RBC NFR: 2.6 % (ref 0.8–2.1)

## 2022-10-03 PROCEDURE — 85046 RETICYTE/HGB CONCENTRATE: CPT

## 2022-10-03 PROCEDURE — 82274 ASSAY TEST FOR BLOOD FECAL: CPT

## 2022-10-03 PROCEDURE — 36415 COLL VENOUS BLD VENIPUNCTURE: CPT

## 2022-10-09 LAB — IMM ASSAY OCC BLD FITOB: POSITIVE

## 2022-10-20 LAB
APOB+LDLR+PCSK9 GENE MUT ANL BLD/T: NOT DETECTED
BRCA1+BRCA2 DEL+DUP + FULL MUT ANL BLD/T: NOT DETECTED
MLH1+MSH2+MSH6+PMS2 GN DEL+DUP+FUL M: NOT DETECTED

## 2022-10-24 ENCOUNTER — OFFICE VISIT (OUTPATIENT)
Dept: URGENT CARE | Facility: CLINIC | Age: 42
End: 2022-10-24
Payer: MEDICARE

## 2022-10-24 VITALS
BODY MASS INDEX: 30.92 KG/M2 | WEIGHT: 197 LBS | HEIGHT: 67 IN | RESPIRATION RATE: 16 BRPM | OXYGEN SATURATION: 98 % | SYSTOLIC BLOOD PRESSURE: 100 MMHG | DIASTOLIC BLOOD PRESSURE: 68 MMHG | HEART RATE: 66 BPM | TEMPERATURE: 98.9 F

## 2022-10-24 DIAGNOSIS — G56.02 ACUTE CARPAL TUNNEL SYNDROME OF LEFT WRIST: ICD-10-CM

## 2022-10-24 PROCEDURE — 99214 OFFICE O/P EST MOD 30 MIN: CPT | Performed by: PHYSICIAN ASSISTANT

## 2022-10-24 RX ORDER — BUPRENORPHINE 5 UG/H
PATCH TRANSDERMAL
COMMUNITY
End: 2023-01-12

## 2022-10-24 RX ORDER — NAPROXEN 500 MG/1
500 TABLET ORAL 2 TIMES DAILY WITH MEALS
Qty: 30 TABLET | Refills: 0 | Status: SHIPPED | OUTPATIENT
Start: 2022-10-24 | End: 2023-01-12

## 2022-10-24 ASSESSMENT — FIBROSIS 4 INDEX: FIB4 SCORE: .853771409590828793

## 2022-10-24 ASSESSMENT — ENCOUNTER SYMPTOMS
FALLS: 0
TINGLING: 1

## 2022-10-24 NOTE — PROGRESS NOTES
Subjective:   Lyssa Leblanc is a 42 y.o. female who presents for Hand Numbness (L hand fingers are numb, L hand/wrist/forearm are painful x 3 days)        Patient presents with acute on chronic left hand pain, numbness, tingling.  She has had the symptoms for many months but symptoms have significantly worsened in the last 3 days.  She points to the volar aspect of the wrist and her first 3 digits as the site of her pain.  Pain radiates into the hand and occasionally up the forearm.  Pain is worsened by movement and grasping.  Nothing alleviates her pain.  Denies remote and recent trauma.  Denies history of carpal tunnel syndrome.  She is not taking any medications for symptoms.  Left hand dominant.    Review of Systems   Musculoskeletal:  Positive for joint pain. Negative for falls.   Neurological:  Positive for tingling.     PMH:  has a past medical history of Anemia, iron deficiency (7/9/2015), Asthma, Seizure (HCC) (2019), and Stroke ([facility]).  MEDS:   Current Outpatient Medications:     Buprenorphine (BUTRANS) 5 MCG/HR PATCH WEEKLY, Place  on the skin., Disp: , Rfl:     naproxen (NAPROSYN) 500 MG Tab, Take 1 Tablet by mouth 2 times a day with meals., Disp: 30 Tablet, Rfl: 0    DEPAKOTE  MG TABLET SR 24 HR, TAKE 5 TABLETS BY MOUTH AT BEDTIME (NAME BRAND MEDICALLY NECESSARY)., Disp: 450 Tablet, Rfl: 3    carBAMazepine SR (TEGRETOL XR) 400 MG TABLET SR 12 HR, Take 1 Tablet by mouth 2 times a day for 360 days., Disp: 180 Tablet, Rfl: 3    Calcium Carbonate (CALCIUM 500 PO), , Disp: , Rfl:     atorvastatin (LIPITOR) 10 MG Tab, , Disp: , Rfl:     ARIPiprazole (ABILIFY) 5 MG tablet, 2 times a day., Disp: , Rfl:     mirtazapine (REMERON) 15 MG Tab, , Disp: , Rfl:     hydrOXYzine HCl (ATARAX) 50 MG Tab, Take 1 Tablet by mouth 2 times a day as needed for Anxiety (for anxiety)., Disp: 60 Tablet, Rfl: 2    lisinopril (PRINIVIL) 10 MG Tab, TAKE 1 TABLET BY MOUTH EVERY DAY IN THE MORNING, Disp: 30 Tablet, Rfl: 5    " VITAMIN D PO, Take 1 Tablet by mouth every day., Disp: , Rfl:     FOLIC ACID PO, Take 1 Tablet by mouth every day., Disp: , Rfl:   ALLERGIES: No Known Allergies  SURGHX:   Past Surgical History:   Procedure Laterality Date    TIBIA NAILING INTRAMEDULLARY Right 3/16/2022    Procedure: INSERTION, INTRAMEDULLARY PRINCESS, TIBIA;  Surgeon: Ryan Mallory M.D.;  Location: SURGERY [facility];  Service: Orthopedics    [place] LAP,DIAGNOSTIC ABDOMEN N/A 9/2/2020    Procedure: PELVISCOPY;  Surgeon: Garrett Osorio M.D.;  Location: SURGERY [facility];  Service: Gynecology    SALPINGECTOMY Bilateral 9/2/2020    Procedure: SALPINGECTOMY;  Surgeon: Garrett Osorio M.D.;  Location: SURGERY [facility];  Service: Gynecology    TUBAL COAGULATION LAPAROSCOPIC BILATERAL       SOCHX:  reports that she has quit smoking. Her smoking use included cigarettes. She smoked an average of 2 packs per day. Her smokeless tobacco use includes chew. She reports that she does not drink alcohol and does not use drugs.  FH: Family history was reviewed, no pertinent findings to report   Objective:   /68   Pulse 66   Temp 37.2 °C (98.9 °F) (Temporal)   Resp 16   Ht 1.702 m (5' 7\")   Wt 89.4 kg (197 lb)   SpO2 98%   BMI 30.85 kg/m²   Physical Exam  Vitals reviewed.   Constitutional:       General: She is not in acute distress.     Appearance: Normal appearance. She is well-developed. She is not toxic-appearing.   HENT:      Head: Normocephalic and atraumatic.      Right Ear: External ear normal.      Left Ear: External ear normal.      Nose: Nose normal.   Cardiovascular:      Rate and Rhythm: Normal rate and regular rhythm.   Pulmonary:      Effort: Pulmonary effort is normal. No respiratory distress.      Breath sounds: No stridor.   Musculoskeletal:      Comments: Left wrist and hand: No erythema edema, deformity.  No muscle atrophy.  Patient tender to palpation over volar aspect of the wrist.  Wrist range of motion within normal " limits.  Sensation intact.   strength 5 out of 5.  Radial, median, ulnar nerves intact.  Cap refill ius brisk.  Radial pulse 2+ positive Tinel's.   Skin:     General: Skin is dry.   Neurological:      Comments: Alert and oriented.    Psychiatric:         Attention and Perception: Attention normal.         Speech: Speech normal.         Assessment/Plan:   1. Acute carpal tunnel syndrome of left wrist  - naproxen (NAPROSYN) 500 MG Tab; Take 1 Tablet by mouth 2 times a day with meals.  Dispense: 30 Tablet; Refill: 0  - Referral to Sports Medicine    Other orders  - Buprenorphine (BUTRANS) 5 MCG/HR PATCH WEEKLY; Place  on the skin.    History and exam today consistent with carpal tunnel syndrome.  Discussed etiology and disease course with patient.  She was fitted with a wrist brace.  I would like her to wear this at all times to include when sleeping.  Patient started on naproxen twice daily as needed for pain.  We will have her follow-up with sports med for further evaluation and management.  Return precautions reviewed.    Differential diagnosis, natural history, supportive care, and indications for immediate follow-up discussed.

## 2022-11-07 ENCOUNTER — PATIENT MESSAGE (OUTPATIENT)
Dept: HEALTH INFORMATION MANAGEMENT | Facility: OTHER | Age: 42
End: 2022-11-07

## 2022-11-15 ENCOUNTER — OFFICE VISIT (OUTPATIENT)
Dept: MEDICAL GROUP | Facility: OTHER | Age: 42
End: 2022-11-15
Payer: MEDICARE

## 2022-11-15 VITALS
RESPIRATION RATE: 16 BRPM | HEART RATE: 73 BPM | DIASTOLIC BLOOD PRESSURE: 68 MMHG | HEIGHT: 67 IN | SYSTOLIC BLOOD PRESSURE: 106 MMHG | WEIGHT: 205 LBS | BODY MASS INDEX: 32.18 KG/M2 | TEMPERATURE: 97.3 F | OXYGEN SATURATION: 97 %

## 2022-11-15 DIAGNOSIS — M79.602 LEFT ARM PAIN: ICD-10-CM

## 2022-11-15 PROCEDURE — 99213 OFFICE O/P EST LOW 20 MIN: CPT | Mod: GC | Performed by: FAMILY MEDICINE

## 2022-11-15 ASSESSMENT — FIBROSIS 4 INDEX: FIB4 SCORE: .853771409590828793

## 2022-11-15 NOTE — PROGRESS NOTES
" Subjective:   Lyssa Leblanc is a 42 y.o. female here for the evaluation and management of Injections (Pt states that she is here for left wrist or hand injection)      Problem   Left Arm Pain    42-year-old left-handed female presenting with concerns of left hand and elbow pain.  Reports that the second through fourth fingers seem to be numb on the left side.  Has been going on for \"quite some time\" but seems to be getting worse more acutely.  No known falls/trauma/triggers.  Also endorses some elbow pain.  Denies dropping things.  Some pain with grasping.  Reports that she has had injections in the wrist in this clinic before (if this has been completed, it may be in the old system as we could not find his records today).         ROS    Current Outpatient Medications   Medication Sig Dispense Refill    Buprenorphine 5 MCG/HR PATCH WEEKLY Place  on the skin.      naproxen (NAPROSYN) 500 MG Tab Take 1 Tablet by mouth 2 times a day with meals. 30 Tablet 0    DEPAKOTE  MG TABLET SR 24 HR TAKE 5 TABLETS BY MOUTH AT BEDTIME (NAME BRAND MEDICALLY NECESSARY). 450 Tablet 3    carBAMazepine SR (TEGRETOL XR) 400 MG TABLET SR 12 HR Take 1 Tablet by mouth 2 times a day for 360 days. 180 Tablet 3    Calcium Carbonate (CALCIUM 500 PO)       atorvastatin (LIPITOR) 10 MG Tab       ARIPiprazole (ABILIFY) 5 MG tablet 2 times a day.      mirtazapine (REMERON) 15 MG Tab       hydrOXYzine HCl (ATARAX) 50 MG Tab Take 1 Tablet by mouth 2 times a day as needed for Anxiety (for anxiety). 60 Tablet 2    lisinopril (PRINIVIL) 10 MG Tab TAKE 1 TABLET BY MOUTH EVERY DAY IN THE MORNING 30 Tablet 5    VITAMIN D PO Take 1 Tablet by mouth every day.      FOLIC ACID PO Take 1 Tablet by mouth every day.       No current facility-administered medications for this visit.       Allergies  Patient has no known allergies.    Past Medical History:   Diagnosis Date    Anemia, iron deficiency 7/9/2015    Asthma     Seizure (HCC) 2019    Stroke " ([facility])     as an infant       Patient Active Problem List    Diagnosis Date Noted    Left arm pain 11/15/2022    Closed fracture of right tibial plateau 03/29/2022    Closed fracture of upper end of right fibula 03/29/2022    Sprain of left ankle 01/03/2022    Left elbow pain 10/20/2021    Post traumatic stress disorder 07/20/2021    Ulnar nerve compression 01/22/2021    Depression, major 10/09/2020    Hypertension 09/29/2020    Abnormal liver enzymes 09/29/2020    Asthma 09/02/2020    Arthralgia of right knee 07/01/2020    Osteopenia 01/22/2020    Carpal tunnel syndrome, left 12/27/2019    Anxiety 09/13/2019    Insomnia 09/13/2019    Mood disorder ([facility]) 09/13/2019    Learning disability 06/26/2019    Right spastic hemiplegia ([facility]) 06/26/2019    Foot drop, right 05/30/2018    Seizure disorder ([facility]) 05/23/2018    Hyperlipidemia 04/24/2018    Family history of cardiovascular disease 04/11/2018    History of stroke 04/11/2018    Family tension 11/29/2016    Other specified postprocedural states 10/08/2015    Anemia, iron deficiency 07/09/2015    Family history of diabetes mellitus 07/09/2015    Obesity 06/05/2015    Scoliosis 11/25/2014       Past Surgical History  Past Surgical History:   Procedure Laterality Date    TIBIA NAILING INTRAMEDULLARY Right 3/16/2022    Procedure: INSERTION, INTRAMEDULLARY PRINCESS, TIBIA;  Surgeon: Ryan Mallory M.D.;  Location: [facility];  Service: Orthopedics    [place] LAP,DIAGNOSTIC ABDOMEN N/A 9/2/2020    Procedure: PELVISCOPY;  Surgeon: Garrett Osorio M.D.;  Location: [facility];  Service: Gynecology    SALPINGECTOMY Bilateral 9/2/2020    Procedure: SALPINGECTOMY;  Surgeon: Garrett Osorio M.D.;  Location: [facility];  Service: Gynecology    TUBAL COAGULATION LAPAROSCOPIC BILATERAL         Social History     Socioeconomic History    Marital status:     Highest education level: 12th grade   Tobacco Use    Smoking status: Former     Packs/day: 2.00     " Types: Cigarettes    Smokeless tobacco: Current     Types: Chew    Tobacco comments:     2 packs a day and vape   Vaping Use    Vaping Use: Every day    Substances: Flavoring    Devices: Pre-filled pod   Substance and Sexual Activity    Alcohol use: No    Drug use: No        Objective:     Vitals:    11/15/22 1026   BP: 106/68   BP Location: Left arm   Patient Position: Sitting   BP Cuff Size: Adult   Pulse: 73   Resp: 16   Temp: 36.3 °C (97.3 °F)   TempSrc: Temporal   SpO2: 97%   Weight: 93 kg (205 lb)   Height: 1.702 m (5' 7\")     Body mass index is 32.11 kg/m².     Physical Exam  GEN: Alert and oriented  MSK: Tenderness palpation at the lateral epicondyle, medial epicondyle, and olecranon process of the left elbow.  Some tenderness with deep palpation of the flexor muscles of the forearm, but not of the extensor muscles.  No AC fossa tenderness.  Pain with wrist extension and wrist flexion.  5/5  strength.  Positive Finkelstein's. No pain with TFCC compression test.  Positive Elton's.  Negative Tinel's.    Assessment and Plan:   Lyssa Leblanc is a 42 y.o. female with a Injections (Pt states that she is here for left wrist or hand injection)     The following was discussed with the patient today.    Problem List Items Addressed This Visit       Left arm pain     Acute on chronic, worsening  Personally reviewed urgent care note from 10/24/2022-at that time, there was concern for acute carpal tunnel syndrome and patient was prescribed naproxen  The etiology of the patient's pain seems to be multifactorial in nature.  She is displaying signs of cubital tunnel syndrome given the numbness/tingling/weakness in the fourth and fifth digit.  However, she is endorsing that digits 2-4 seem to be most affected, which does not align with either carpal tunnel or cubital tunnel syndrome.  There is no signs of atrophy of the hand muscles.  Additionally, patient is diffusely tender around the elbow, at both " epicondyles and at the olecranon process posteriorly.  Believe patient would benefit from physical therapy, more concisely, hand therapy.  --Prescription written for hand therapy at [facility].  Patient amenable to trial of therapy  Discussed in detail that I do not believe a steroid injection would be best at this time as the pain is not localized to 1 spot.  Could consider this in the future            Followup: No follow-ups on file. RTC as needed.    Jordan Byrd M.D.    Patient seen/discussed with Dr. Ayan MD.    Please note that this dictation was created using voice recognition software. I have made every reasonable attempt to correct obvious errors, but I expect that there are errors of grammar and possibly content that I did not discover before finalizing the note.

## 2022-11-15 NOTE — ASSESSMENT & PLAN NOTE
Acute on chronic, worsening  Personally reviewed urgent care note from 10/24/2022-at that time, there was concern for acute carpal tunnel syndrome and patient was prescribed naproxen  The etiology of the patient's pain seems to be multifactorial in nature.  She is displaying signs of cubital tunnel syndrome given the numbness/tingling/weakness in the fourth and fifth digit.  However, she is endorsing that digits 2-4 seem to be most affected, which does not align with either carpal tunnel or cubital tunnel syndrome.  There is no signs of atrophy of the hand muscles.  Additionally, patient is diffusely tender around the elbow, at both epicondyles and at the olecranon process posteriorly.  Believe patient would benefit from physical therapy, more concisely, hand therapy.  --Prescription written for hand therapy at [facility].  Patient amenable to trial of therapy  Discussed in detail that I do not believe a steroid injection would be best at this time as the pain is not localized to 1 spot.  Could consider this in the future

## 2022-12-27 ENCOUNTER — TELEPHONE (OUTPATIENT)
Dept: MEDICAL GROUP | Facility: OTHER | Age: 42
End: 2022-12-27

## 2022-12-27 DIAGNOSIS — G56.02 CARPAL TUNNEL SYNDROME, LEFT: ICD-10-CM

## 2022-12-27 NOTE — TELEPHONE ENCOUNTER
Patient is being seen at [facility] and they instructed her to contact us as they put in a request for an extension of PT and haven't heard back.     [phone]

## 2023-01-09 ENCOUNTER — OFFICE VISIT (OUTPATIENT)
Dept: URGENT CARE | Facility: CLINIC | Age: 43
End: 2023-01-09
Payer: MEDICARE

## 2023-01-09 VITALS
HEART RATE: 68 BPM | TEMPERATURE: 98.4 F | RESPIRATION RATE: 14 BRPM | WEIGHT: 222 LBS | HEIGHT: 67 IN | SYSTOLIC BLOOD PRESSURE: 130 MMHG | BODY MASS INDEX: 34.84 KG/M2 | OXYGEN SATURATION: 97 % | DIASTOLIC BLOOD PRESSURE: 80 MMHG

## 2023-01-09 DIAGNOSIS — J01.00 ACUTE NON-RECURRENT MAXILLARY SINUSITIS: ICD-10-CM

## 2023-01-09 PROCEDURE — 99213 OFFICE O/P EST LOW 20 MIN: CPT | Performed by: PHYSICIAN ASSISTANT

## 2023-01-09 RX ORDER — FLUTICASONE PROPIONATE 50 MCG
2 SPRAY, SUSPENSION (ML) NASAL DAILY
Qty: 16 G | Refills: 0 | Status: SHIPPED | OUTPATIENT
Start: 2023-01-09 | End: 2023-10-19

## 2023-01-09 ASSESSMENT — FIBROSIS 4 INDEX: FIB4 SCORE: .853771409590828793

## 2023-01-09 NOTE — PROGRESS NOTES
Subjective:   Lyssa Leblanc is a 42 y.o. female who presents for Sinus Problem (Head pressure, sinus, no cough x 1 night)      HPI  The patient presents to the Urgent Care with complaints of nasal congestion and a headache onset last night.  Headache location bilaterally to the frontal head described as a throbbing headache.  She states this is not the worst headache of her life.  Gradual onset.  Associated nasal congestion.  Her kids with similar symptoms.  Denies any fever, chills, sore throat, cough, chest pain, SOB, vomiting, diarrhea, vision changes, ear pain.         Medications:    ARIPiprazole  atorvastatin Tabs  Buprenorphine Ptwk  CALCIUM 500 PO  carBAMazepine SR Tb12  Depakote ER Tb24  FOLIC ACID PO  hydrOXYzine HCl Tabs  lisinopril Tabs  mirtazapine Tabs  naproxen Tabs  VITAMIN D PO    Allergies: Patient has no known allergies.    Problem List: Lyssa Leblanc does not have any pertinent problems on file.    Surgical History:  Past Surgical History:   Procedure Laterality Date    TIBIA NAILING INTRAMEDULLARY Right 3/16/2022    Procedure: INSERTION, INTRAMEDULLARY PRINCESS, TIBIA;  Surgeon: Ryan Mallory M.D.;  Location: SURGERY [facility];  Service: Orthopedics    AR LAP,DIAGNOSTIC ABDOMEN N/A 9/2/2020    Procedure: PELVISCOPY;  Surgeon: Garrett Osorio M.D.;  Location: [facility];  Service: Gynecology    SALPINGECTOMY Bilateral 9/2/2020    Procedure: SALPINGECTOMY;  Surgeon: Garrett Osorio M.D.;  Location: [facility];  Service: Gynecology    TUBAL COAGULATION LAPAROSCOPIC BILATERAL         Past Social Hx: Lyssa Leblanc  reports that she has quit smoking. Her smoking use included cigarettes. She smoked an average of 2 packs per day. Her smokeless tobacco use includes chew. She reports that she does not drink alcohol and does not use drugs.     Past Family Hx:  Lyssa Leblanc family history includes Diabetes in her maternal grandmother and mother; Heart  "Disease in her mother; Hypertension in her father.     Problem list, medications, and allergies reviewed by myself today in Epic.     Objective:     /80   Pulse 68   Temp 36.9 °C (98.4 °F) (Temporal)   Resp 14   Ht 1.702 m (5' 7\")   Wt 101 kg (222 lb)   SpO2 97%   BMI 34.77 kg/m²     Physical Exam  Vitals reviewed.   Constitutional:       General: She is not in acute distress.     Appearance: Normal appearance. She is not ill-appearing or toxic-appearing.   HENT:      Head: Normocephalic.      Right Ear: Tympanic membrane normal.      Left Ear: Tympanic membrane normal.      Nose: Congestion present.      Mouth/Throat:      Mouth: Mucous membranes are moist.      Pharynx: Oropharynx is clear. No oropharyngeal exudate or posterior oropharyngeal erythema.   Eyes:      Conjunctiva/sclera: Conjunctivae normal.      Pupils: Pupils are equal, round, and reactive to light.   Cardiovascular:      Rate and Rhythm: Normal rate and regular rhythm.      Heart sounds: Normal heart sounds.   Pulmonary:      Effort: Pulmonary effort is normal. No respiratory distress.      Breath sounds: Normal breath sounds. No wheezing, rhonchi or rales.   Musculoskeletal:      Cervical back: Neck supple. No rigidity.   Lymphadenopathy:      Cervical: No cervical adenopathy.   Skin:     General: Skin is warm and dry.   Neurological:      General: No focal deficit present.      Mental Status: She is alert and oriented to person, place, and time.   Psychiatric:         Mood and Affect: Mood normal.         Behavior: Behavior normal.       Diagnosis and associated orders:     1. Acute non-recurrent maxillary sinusitis  - fluticasone (FLONASE) 50 MCG/ACT nasal spray; Administer 2 Sprays into affected nostril(S) every day.  Dispense: 16 g; Refill: 0       Comments/MDM:     Patient's presenting symptoms and exam findings are consistent with sinusitis most likely viral etiology. Therefore, antibiotics are not indicated at this time.   I " recommend plenty of fluids, Flonase nasal spray, nasal saline washes or nicole pot, Mucinex decongestant, Ibuprofen or Tylenol for pain, non-drowsy antihistamine such as Zyrtec or Claritin.          I personally reviewed prior external notes and test results pertinent to today's visit. Pathogenesis of diagnosis discussed including typical length and natural progression. Supportive care, natural history, differential diagnoses, and indications for immediate follow-up discussed. Patient expresses understanding and agrees to plan. Patient denies any other questions or concerns.     Follow-up with the primary care physician for recheck, reevaluation, and consideration of further management.    Please note that this dictation was created using voice recognition software. I have made a reasonable attempt to correct obvious errors, but I expect that there are errors of grammar and possibly content that I did not discover before finalizing the note.    This note was electronically signed by Theodore Sequeira PA-C

## 2023-01-11 PROBLEM — G56.02 CARPAL TUNNEL SYNDROME OF LEFT WRIST: Status: ACTIVE | Noted: 2023-01-11

## 2023-01-20 ENCOUNTER — PRE-ADMISSION TESTING (OUTPATIENT)
Dept: ADMISSIONS | Facility: MEDICAL CENTER | Age: 43
End: 2023-01-20
Attending: ORTHOPAEDIC SURGERY
Payer: MEDICARE

## 2023-01-20 DIAGNOSIS — Z01.812 PRE-OPERATIVE LABORATORY EXAMINATION: ICD-10-CM

## 2023-01-20 LAB
ANION GAP SERPL CALC-SCNC: 13 MMOL/L (ref 7–16)
BUN SERPL-MCNC: 18 MG/DL (ref 8–22)
CALCIUM SERPL-MCNC: 9.8 MG/DL (ref 8.5–10.5)
CHLORIDE SERPL-SCNC: 103 MMOL/L (ref 96–112)
CO2 SERPL-SCNC: 25 MMOL/L (ref 20–33)
CREAT SERPL-MCNC: 0.93 MG/DL (ref 0.5–1.4)
GFR SERPLBLD CREATININE-BSD FMLA CKD-EPI: 79 ML/MIN/1.73 M 2
GLUCOSE SERPL-MCNC: 99 MG/DL (ref 65–99)
POTASSIUM SERPL-SCNC: 3.7 MMOL/L (ref 3.6–5.5)
SODIUM SERPL-SCNC: 141 MMOL/L (ref 135–145)

## 2023-01-20 PROCEDURE — 80048 BASIC METABOLIC PNL TOTAL CA: CPT

## 2023-01-20 PROCEDURE — 36415 COLL VENOUS BLD VENIPUNCTURE: CPT

## 2023-01-20 ASSESSMENT — FIBROSIS 4 INDEX: FIB4 SCORE: 0.84

## 2023-01-30 ENCOUNTER — HOSPITAL ENCOUNTER (OUTPATIENT)
Dept: LAB | Facility: MEDICAL CENTER | Age: 43
End: 2023-01-30
Attending: NURSE PRACTITIONER
Payer: MEDICARE

## 2023-01-30 LAB
ALBUMIN SERPL BCP-MCNC: 4.1 G/DL (ref 3.2–4.9)
ALBUMIN/GLOB SERPL: 1.3 G/DL
ALP SERPL-CCNC: 71 U/L (ref 30–99)
ALT SERPL-CCNC: 9 U/L (ref 2–50)
ANION GAP SERPL CALC-SCNC: 11 MMOL/L (ref 7–16)
AST SERPL-CCNC: 11 U/L (ref 12–45)
BASOPHILS # BLD AUTO: 0.6 % (ref 0–1.8)
BASOPHILS # BLD: 0.03 K/UL (ref 0–0.12)
BILIRUB SERPL-MCNC: 0.2 MG/DL (ref 0.1–1.5)
BUN SERPL-MCNC: 16 MG/DL (ref 8–22)
CALCIUM ALBUM COR SERPL-MCNC: 8.9 MG/DL (ref 8.5–10.5)
CALCIUM SERPL-MCNC: 9 MG/DL (ref 8.5–10.5)
CHLORIDE SERPL-SCNC: 108 MMOL/L (ref 96–112)
CHOLEST SERPL-MCNC: 235 MG/DL (ref 100–199)
CO2 SERPL-SCNC: 25 MMOL/L (ref 20–33)
CREAT SERPL-MCNC: 0.86 MG/DL (ref 0.5–1.4)
EOSINOPHIL # BLD AUTO: 0.09 K/UL (ref 0–0.51)
EOSINOPHIL NFR BLD: 1.8 % (ref 0–6.9)
ERYTHROCYTE [DISTWIDTH] IN BLOOD BY AUTOMATED COUNT: 40.5 FL (ref 35.9–50)
EST. AVERAGE GLUCOSE BLD GHB EST-MCNC: 108 MG/DL
FERRITIN SERPL-MCNC: 88.8 NG/ML (ref 10–291)
GFR SERPLBLD CREATININE-BSD FMLA CKD-EPI: 86 ML/MIN/1.73 M 2
GLOBULIN SER CALC-MCNC: 3.1 G/DL (ref 1.9–3.5)
GLUCOSE SERPL-MCNC: 84 MG/DL (ref 65–99)
HBA1C MFR BLD: 5.4 % (ref 4–5.6)
HCT VFR BLD AUTO: 37.5 % (ref 37–47)
HDLC SERPL-MCNC: 40 MG/DL
HGB BLD-MCNC: 12 G/DL (ref 12–16)
IMM GRANULOCYTES # BLD AUTO: 0.01 K/UL (ref 0–0.11)
IMM GRANULOCYTES NFR BLD AUTO: 0.2 % (ref 0–0.9)
IRON SATN MFR SERPL: 22 % (ref 15–55)
IRON SERPL-MCNC: 52 UG/DL (ref 40–170)
LDLC SERPL CALC-MCNC: 168 MG/DL
LYMPHOCYTES # BLD AUTO: 1.56 K/UL (ref 1–4.8)
LYMPHOCYTES NFR BLD: 32 % (ref 22–41)
MCH RBC QN AUTO: 29 PG (ref 27–33)
MCHC RBC AUTO-ENTMCNC: 32 G/DL (ref 33.6–35)
MCV RBC AUTO: 90.6 FL (ref 81.4–97.8)
MONOCYTES # BLD AUTO: 0.32 K/UL (ref 0–0.85)
MONOCYTES NFR BLD AUTO: 6.6 % (ref 0–13.4)
NEUTROPHILS # BLD AUTO: 2.86 K/UL (ref 2–7.15)
NEUTROPHILS NFR BLD: 58.8 % (ref 44–72)
NRBC # BLD AUTO: 0 K/UL
NRBC BLD-RTO: 0 /100 WBC
PLATELET # BLD AUTO: 182 K/UL (ref 164–446)
PMV BLD AUTO: 14.4 FL (ref 9–12.9)
POTASSIUM SERPL-SCNC: 3.6 MMOL/L (ref 3.6–5.5)
PROT SERPL-MCNC: 7.2 G/DL (ref 6–8.2)
RBC # BLD AUTO: 4.14 M/UL (ref 4.2–5.4)
SODIUM SERPL-SCNC: 144 MMOL/L (ref 135–145)
TIBC SERPL-MCNC: 236 UG/DL (ref 250–450)
TRIGL SERPL-MCNC: 134 MG/DL (ref 0–149)
TSH SERPL DL<=0.005 MIU/L-ACNC: 2.96 UIU/ML (ref 0.38–5.33)
UIBC SERPL-MCNC: 184 UG/DL (ref 110–370)
WBC # BLD AUTO: 4.9 K/UL (ref 4.8–10.8)

## 2023-01-30 PROCEDURE — 83550 IRON BINDING TEST: CPT

## 2023-01-30 PROCEDURE — 84443 ASSAY THYROID STIM HORMONE: CPT

## 2023-01-30 PROCEDURE — 82728 ASSAY OF FERRITIN: CPT

## 2023-01-30 PROCEDURE — 36415 COLL VENOUS BLD VENIPUNCTURE: CPT | Mod: GA

## 2023-01-30 PROCEDURE — 83540 ASSAY OF IRON: CPT

## 2023-01-30 PROCEDURE — 80053 COMPREHEN METABOLIC PANEL: CPT

## 2023-01-30 PROCEDURE — 80061 LIPID PANEL: CPT

## 2023-01-30 PROCEDURE — 85025 COMPLETE CBC W/AUTO DIFF WBC: CPT

## 2023-01-30 PROCEDURE — 83036 HEMOGLOBIN GLYCOSYLATED A1C: CPT | Mod: GA

## 2023-01-31 ENCOUNTER — ANESTHESIA EVENT (OUTPATIENT)
Dept: SURGERY | Facility: MEDICAL CENTER | Age: 43
End: 2023-01-31
Payer: MEDICARE

## 2023-01-31 ENCOUNTER — ANESTHESIA (OUTPATIENT)
Dept: SURGERY | Facility: MEDICAL CENTER | Age: 43
End: 2023-01-31
Payer: MEDICARE

## 2023-01-31 ENCOUNTER — HOSPITAL ENCOUNTER (OUTPATIENT)
Facility: MEDICAL CENTER | Age: 43
End: 2023-01-31
Attending: ORTHOPAEDIC SURGERY | Admitting: ORTHOPAEDIC SURGERY
Payer: MEDICARE

## 2023-01-31 VITALS
OXYGEN SATURATION: 99 % | DIASTOLIC BLOOD PRESSURE: 88 MMHG | HEART RATE: 59 BPM | SYSTOLIC BLOOD PRESSURE: 124 MMHG | BODY MASS INDEX: 33.77 KG/M2 | TEMPERATURE: 97.5 F | WEIGHT: 215.17 LBS | HEIGHT: 67 IN | RESPIRATION RATE: 18 BRPM

## 2023-01-31 DIAGNOSIS — G89.18 POST-OPERATIVE PAIN: ICD-10-CM

## 2023-01-31 LAB — HCG UR QL: NEGATIVE

## 2023-01-31 PROCEDURE — 160002 HCHG RECOVERY MINUTES (STAT): Performed by: ORTHOPAEDIC SURGERY

## 2023-01-31 PROCEDURE — 700105 HCHG RX REV CODE 258: Performed by: ORTHOPAEDIC SURGERY

## 2023-01-31 PROCEDURE — 160025 RECOVERY II MINUTES (STATS): Performed by: ORTHOPAEDIC SURGERY

## 2023-01-31 PROCEDURE — 160046 HCHG PACU - 1ST 60 MINS PHASE II: Performed by: ORTHOPAEDIC SURGERY

## 2023-01-31 PROCEDURE — 700101 HCHG RX REV CODE 250: Performed by: ORTHOPAEDIC SURGERY

## 2023-01-31 PROCEDURE — 81025 URINE PREGNANCY TEST: CPT

## 2023-01-31 PROCEDURE — 29848 WRIST ENDOSCOPY/SURGERY: CPT | Mod: LT | Performed by: ORTHOPAEDIC SURGERY

## 2023-01-31 PROCEDURE — 700101 HCHG RX REV CODE 250: Performed by: ANESTHESIOLOGY

## 2023-01-31 PROCEDURE — 160009 HCHG ANES TIME/MIN: Performed by: ORTHOPAEDIC SURGERY

## 2023-01-31 PROCEDURE — 160029 HCHG SURGERY MINUTES - 1ST 30 MINS LEVEL 4: Performed by: ORTHOPAEDIC SURGERY

## 2023-01-31 PROCEDURE — 700111 HCHG RX REV CODE 636 W/ 250 OVERRIDE (IP): Performed by: ANESTHESIOLOGY

## 2023-01-31 PROCEDURE — 01810 ANES PX NRV MUSC F/ARM WRST: CPT | Performed by: ANESTHESIOLOGY

## 2023-01-31 PROCEDURE — 160048 HCHG OR STATISTICAL LEVEL 1-5: Performed by: ORTHOPAEDIC SURGERY

## 2023-01-31 PROCEDURE — A9270 NON-COVERED ITEM OR SERVICE: HCPCS | Performed by: ANESTHESIOLOGY

## 2023-01-31 PROCEDURE — 160035 HCHG PACU - 1ST 60 MINS PHASE I: Performed by: ORTHOPAEDIC SURGERY

## 2023-01-31 PROCEDURE — 700102 HCHG RX REV CODE 250 W/ 637 OVERRIDE(OP): Performed by: ANESTHESIOLOGY

## 2023-01-31 RX ORDER — TRAMADOL HYDROCHLORIDE 50 MG/1
50 TABLET ORAL EVERY 6 HOURS PRN
Qty: 15 TABLET | Refills: 0 | Status: SHIPPED | OUTPATIENT
Start: 2023-01-31 | End: 2023-02-05

## 2023-01-31 RX ORDER — HALOPERIDOL 5 MG/ML
1 INJECTION INTRAMUSCULAR
Status: DISCONTINUED | OUTPATIENT
Start: 2023-01-31 | End: 2023-01-31 | Stop reason: HOSPADM

## 2023-01-31 RX ORDER — CEFAZOLIN SODIUM 1 G/3ML
2 INJECTION, POWDER, FOR SOLUTION INTRAMUSCULAR; INTRAVENOUS ONCE
Status: DISCONTINUED | OUTPATIENT
Start: 2023-01-31 | End: 2023-01-31 | Stop reason: HOSPADM

## 2023-01-31 RX ORDER — OXYCODONE HCL 5 MG/5 ML
5 SOLUTION, ORAL ORAL
Status: DISCONTINUED | OUTPATIENT
Start: 2023-01-31 | End: 2023-01-31 | Stop reason: HOSPADM

## 2023-01-31 RX ORDER — BUPIVACAINE HYDROCHLORIDE 5 MG/ML
INJECTION, SOLUTION EPIDURAL; INTRACAUDAL
Status: DISCONTINUED
Start: 2023-01-31 | End: 2023-01-31 | Stop reason: HOSPADM

## 2023-01-31 RX ORDER — SODIUM CHLORIDE, SODIUM LACTATE, POTASSIUM CHLORIDE, CALCIUM CHLORIDE 600; 310; 30; 20 MG/100ML; MG/100ML; MG/100ML; MG/100ML
INJECTION, SOLUTION INTRAVENOUS CONTINUOUS
Status: DISCONTINUED | OUTPATIENT
Start: 2023-01-31 | End: 2023-01-31 | Stop reason: HOSPADM

## 2023-01-31 RX ORDER — BUPIVACAINE HYDROCHLORIDE 5 MG/ML
INJECTION, SOLUTION EPIDURAL; INTRACAUDAL
Status: DISCONTINUED | OUTPATIENT
Start: 2023-01-31 | End: 2023-01-31 | Stop reason: HOSPADM

## 2023-01-31 RX ORDER — DIPHENHYDRAMINE HYDROCHLORIDE 50 MG/ML
12.5 INJECTION INTRAMUSCULAR; INTRAVENOUS
Status: DISCONTINUED | OUTPATIENT
Start: 2023-01-31 | End: 2023-01-31 | Stop reason: HOSPADM

## 2023-01-31 RX ORDER — MIDAZOLAM HYDROCHLORIDE 1 MG/ML
INJECTION INTRAMUSCULAR; INTRAVENOUS PRN
Status: DISCONTINUED | OUTPATIENT
Start: 2023-01-31 | End: 2023-01-31 | Stop reason: SURG

## 2023-01-31 RX ORDER — MEPERIDINE HYDROCHLORIDE 25 MG/ML
6.25 INJECTION INTRAMUSCULAR; INTRAVENOUS; SUBCUTANEOUS
Status: DISCONTINUED | OUTPATIENT
Start: 2023-01-31 | End: 2023-01-31 | Stop reason: HOSPADM

## 2023-01-31 RX ORDER — OXYCODONE HCL 5 MG/5 ML
10 SOLUTION, ORAL ORAL
Status: DISCONTINUED | OUTPATIENT
Start: 2023-01-31 | End: 2023-01-31 | Stop reason: HOSPADM

## 2023-01-31 RX ORDER — ONDANSETRON 2 MG/ML
4 INJECTION INTRAMUSCULAR; INTRAVENOUS
Status: DISCONTINUED | OUTPATIENT
Start: 2023-01-31 | End: 2023-01-31 | Stop reason: HOSPADM

## 2023-01-31 RX ORDER — LIDOCAINE HYDROCHLORIDE 10 MG/ML
INJECTION, SOLUTION EPIDURAL; INFILTRATION; INTRACAUDAL; PERINEURAL
Status: DISCONTINUED
Start: 2023-01-31 | End: 2023-01-31 | Stop reason: HOSPADM

## 2023-01-31 RX ORDER — ACETAMINOPHEN 500 MG
1000 TABLET ORAL ONCE
Status: COMPLETED | OUTPATIENT
Start: 2023-01-31 | End: 2023-01-31

## 2023-01-31 RX ORDER — CELECOXIB 200 MG/1
200 CAPSULE ORAL ONCE
Status: COMPLETED | OUTPATIENT
Start: 2023-01-31 | End: 2023-01-31

## 2023-01-31 RX ORDER — LIDOCAINE HYDROCHLORIDE 20 MG/ML
INJECTION, SOLUTION EPIDURAL; INFILTRATION; INTRACAUDAL; PERINEURAL PRN
Status: DISCONTINUED | OUTPATIENT
Start: 2023-01-31 | End: 2023-01-31 | Stop reason: SURG

## 2023-01-31 RX ORDER — LIDOCAINE HYDROCHLORIDE 10 MG/ML
INJECTION, SOLUTION INFILTRATION; PERINEURAL
Status: DISCONTINUED | OUTPATIENT
Start: 2023-01-31 | End: 2023-01-31 | Stop reason: HOSPADM

## 2023-01-31 RX ADMIN — LIDOCAINE HYDROCHLORIDE 20 MG: 20 INJECTION, SOLUTION EPIDURAL; INFILTRATION; INTRACAUDAL at 09:01

## 2023-01-31 RX ADMIN — PROPOFOL 20 MG: 10 INJECTION, EMULSION INTRAVENOUS at 09:01

## 2023-01-31 RX ADMIN — SODIUM CHLORIDE, POTASSIUM CHLORIDE, SODIUM LACTATE AND CALCIUM CHLORIDE: 600; 310; 30; 20 INJECTION, SOLUTION INTRAVENOUS at 08:02

## 2023-01-31 RX ADMIN — CELECOXIB 200 MG: 200 CAPSULE ORAL at 08:44

## 2023-01-31 RX ADMIN — FENTANYL CITRATE 50 MCG: 50 INJECTION, SOLUTION INTRAMUSCULAR; INTRAVENOUS at 09:01

## 2023-01-31 RX ADMIN — MIDAZOLAM HYDROCHLORIDE 2 MG: 1 INJECTION, SOLUTION INTRAMUSCULAR; INTRAVENOUS at 08:56

## 2023-01-31 RX ADMIN — ACETAMINOPHEN 1000 MG: 500 TABLET ORAL at 08:44

## 2023-01-31 ASSESSMENT — PAIN DESCRIPTION - PAIN TYPE: TYPE: SURGICAL PAIN

## 2023-01-31 ASSESSMENT — FIBROSIS 4 INDEX: FIB4 SCORE: .8461538461538461538

## 2023-01-31 ASSESSMENT — PAIN SCALES - GENERAL: PAIN_LEVEL: 0

## 2023-01-31 NOTE — DISCHARGE INSTRUCTIONS
Special Doctor Instructions:  Keep dressing clean and dry   Elevate extremity   May remove dressing 5-6 days and shower   Encourage moving all fingers    If any questions arise, call your provider.  If your provider is not available, please feel free to call the Surgical Center at [phone].    MEDICATIONS: Resume taking daily medication.  Take prescribed pain medication with food.  If no medication is prescribed, you may take non-aspirin pain medication if needed.  PAIN MEDICATION CAN BE VERY CONSTIPATING.  Take a stool softener or laxative such as senokot, pericolace, or milk of magnesia if needed.    Last pain medication:   Tylenol given at 8:45 am, next dose cn be taken at 2:45 pm (every 6 hours).   Celebrex (like Ibuprofen\motrin) given at 8:45 am, next dose can be taken at 2:45 pm (every 6 hours).    What to Expect Post Anesthesia    Rest and take it easy for the first 24 hours.  A responsible adult is recommended to remain with you during that time.  It is normal to feel sleepy.  We encourage you to not do anything that requires balance, judgment or coordination.    FOR 24 HOURS DO NOT:  Drive, operate machinery or run household appliances.  Drink beer or alcoholic beverages.  Make important decisions or sign legal documents.    To avoid nausea, slowly advance diet as tolerated, avoiding spicy or greasy foods for the first day.  Add more substantial food to your diet according to your provider's instructions.  Babies can be fed formula or breast milk as soon as they are hungry.  INCREASE FLUIDS AND FIBER TO AVOID CONSTIPATION.    MILD FLU-LIKE SYMPTOMS ARE NORMAL.  YOU MAY EXPERIENCE GENERALIZED MUSCLE ACHES, THROAT IRRITATION, HEADACHE AND/OR SOME NAUSEA.

## 2023-01-31 NOTE — OR NURSING
"0916 received patient from OR. Report from Anesthesiologist and OR RN. Patient on 2L at 99 % O2. VSS. Monitor connected. Oral airway in place. S/P left carpal tunnel release, incision left wrist, dressing CDI.    0920 Patient reports no pain or nausea at this time. Tolerating oral fluids well.     0938 Father Mack, updated on patient status.    0940 Patient requesting to get dressed, reporting a tolerable pain level of 2\10 and \"ready to go\".    0945 Discharge instructions covered with Mack, verbalizes understanding. All questions answered at this time.    1005 Patient escorted out to responsible adult via wheelchair by RN. Belongings with patient, ambulated with steady gait. Discharge paperwork and instructions reviewed, signed, and sent with patient. IV removed.   "

## 2023-01-31 NOTE — LETTER
January 12, 2023    Patient Name: Lyssa Leblanc  Surgeon Name: Reid Trinh M.D.  Surgery Facility: [facility] ([address] [facility])  Surgery Date: 1/31/2023    The time of your surgery is not final and may change up to and until the day of your surgery. You will be contacted 24-48 hours prior to your surgery date with your check-in and surgery time.    If you will not be at one of the below numbers please call the surgery scheduler at [phone]  Preferred Phone: [phone]    BEFORE YOUR SURGERY   Pre Registration and/or Lab Work must be done within and no earlier than 28 days prior to your surgery date. Please call [facility] at [phone] for an appointment as soon as possible.    Instructions: Bring a list of all medications you are taking including the dosing and frequency.    DAY OF YOUR SURGERY  Nothing to eat or drink after midnight     Refrain from smoking any substance after midnight prior to surgery. Smoking may interfere with the anesthetic and frequently produces nausea during the recovery period.    Continue taking all lifesaving medications. Including the morning of your surgery with small sip of water.    Please do NOT take on the day of surgery:  Diuretics: examples- furosemide (Lasix), spironolactone, hydrochlorothiazide  ACE-inhibitors: examples- lisinopril, ramipril, enalapril  “ARBs”: examples- losartan, Olmesartan, valsartan    Please arrive at the hospital/surgery center at the check-in time provided.     An adult will need to bring you and take you home after your surgery.     AFTER YOUR SURGERY  Post op Appointment:   Date: 2/13   Time: 530PM   With: Reid Trinh M.D.   Location: [address] [place] [address]      TIME OFF WORK  FMLA or Disability forms can be faxed directly to: [phone] or you may drop them off at [address] [place] [address]. Our office charges a $35.00 fee per form. Forms will be completed within 10  business days of drop off and payment received. For the status of your forms you may contact our disability office directly [phone].    MEDICATION INSTRUCTIONS **Please read section completely**    The following medications should be stopped a minimum of 10 days prior to surgery:  All over the counter, Supplements & Herbal medications    Anorectics: Phentermine (Adipex-P, Lomaira and Suprenza), Phentermine-topiramate (Qsymia), Bupropion-naltrexone (Contrave)    Opiod Partial Agonists/Opioid Antagonists: Buprenorphine (Subocone, Belbuca, Butrans, Probuphine Implant, Sublocade), Naltrexone (ReVia, Vivitrol), Naloxone    Amphetamines: Dextroamphetamine/Amphetamine (Adderall, Mydayis), Methylphenidate Hydrochloride (Concerta, Metadate, Methylin, Ritalin)    The following medications should be stopped 5 days prior to surgery:  Blood Thinners: Any Aspirin, Aspirin products, anti-inflammatories such as ibuprofen and any blood thinners such as Coumadin and Plavix. Please consult your prescribing physician if you are on life saving blood thinners, in regards to when to stop medications prior to surgery.     The following medications should be stopped a minimum of 3 days prior to surgery:  PDE-5 inhibitors: Sildenafil (Viagra), Tadalafil (Cialis), Vardenafil (Levitra), Avanafil (Stendra)    MAO Inhibitors: Rasagiline (Azilect), Selegiline (Eldepryl, Emsam, Selapar), Isocarboxazid (Marplan), Phenelzine (Nardil)

## 2023-01-31 NOTE — ANESTHESIA TIME REPORT
Anesthesia Start and Stop Event Times     Date Time Event    1/31/2023 0843 Ready for Procedure     0856 Anesthesia Start     0918 Anesthesia Stop        Responsible Staff  01/31/23    Name Role Begin End    Petra Harris M.D. Anesth 0856 0918        Overtime Reason:  no overtime (within assigned shift)    Comments:

## 2023-01-31 NOTE — DISCHARGE INSTR - OTHER INFO
"{[facility] additional discharge instructions:[id number]::\"Minimal activity at home for *** days\"}                                                                                                          DR. LACY POST-OPERATIVE INSTRUCTIONS    You have just undergone a sugery by Dr. Lacy in the operating room.  It is our wish that your postoperative recovery be as quick and comfortable as possible.  Please carefully review the following items that are important for your recovery.    After any operation, a certain degree of pain is to be expected. Take Advil (ibuprofen) and Extra Strength Tylenol as first line medications for mild to moderate pain. Taking each one every 6 hours, and staggering them so that you are taking one medicine every 3 hours, is the most effective. Refer to dosing instructions on the bottle, but in general ibuprofen dose is 600-800mg and Tylenol dose is 500-1000mg. For most small procedures, this should be enough to keep you comfortable. Take these medications until your followup visit. You may have been given a small prescription for stronger pain medicine which will help relieve more severe pain.  Pain medicine may make you drowsy so please keep this in mind.  Do not drive while taking pain medicine.      When you go home, please keep your operated arm elevated at all times (above the level of your heart).  If you do this, your swelling will resolve more quickly and your pain will be improve more quickly as well. You may also place an ice pack over your dressing or splint to help with swelling and pain.    It is also very important to keep your fingers moving after most procedures, unless you had a tendon repair or fracture repair in which case you will be in a splint. Keep all of your fingers moving through a full range of motion to prevent stiffness.    For small hand procedures such as carpal tunnel release, trigger finger release, and cyst excision, the dressing that you have on your extremity " should remain on for 3-4 days. It may then be removed, you can wash the incision gently with soap and water, and keep the incision covered with a band-aid or similar clean dressing. For larger procedures or if you have a splint on, these should remain on until follow up or as specifically instructed. If you feel that your dressing is too tight during the first 3 days after surgery, you may loosen it. It is normal to see minor staining on the dressing after surgery. If there is significant bleeding, you are advised to call the office during regular office hours to have this checked.  Make sure that your dressing is kept dry at all times.  You can take a shower if you cover your arm with a plastic bag. If your dressing gets wet, replace it with sterile dressing that you can obtain from your local drug store.    Follow-up appointments can usually be found on the pre-op paperwork if not please call our office for a follow-up appointment, [phone]. Follow up after surgery is typically 10-14 days, unless you were specifically instructed otherwise. The sutures will be removed and you may be asked to see a hand therapist to optimize your functional result. Each of the hand therapists that you will be referred to have received special training in the care of the hand and upper extremity.    If you have questions regarding your surgery postop that you feel requires attention, please call the office at [phone] during business hours, or [phone] after hours for the answering service. If you feel that you have a surgical emergency postoperatively that requires immediate attention, please call the above numbers or go to the Emergency Department and ask for the Orthopedic Surgeon on call.

## 2023-01-31 NOTE — ANESTHESIA POSTPROCEDURE EVALUATION
Patient: Lyssa Leblanc    Procedure Summary     Date: 01/31/23 Room / Location: [facility] ROOM 21 / SURGERY SAME DAY [facility]    Anesthesia Start: 0856 Anesthesia Stop: 0918    Procedure: LEFT ENDOSCOPIC CARPAL TUNNEL RELEASE (Left: Hand) Diagnosis:       Carpal tunnel syndrome      (Carpal tunnel syndrome [G56.00])    Surgeons: Reid Trinh M.D. Responsible Provider: Petra Harris M.D.    Anesthesia Type: MAC ASA Status: 2          Final Anesthesia Type: MAC  Last vitals  BP   Blood Pressure: 132/60    Temp   36.4 °C (97.5 °F)    Pulse   (!) 52   Resp   14    SpO2   96 %      Anesthesia Post Evaluation    Patient location during evaluation: PACU  Patient participation: complete - patient participated  Level of consciousness: awake and alert  Pain score: 0    Airway patency: patent  Anesthetic complications: no  Cardiovascular status: hemodynamically stable  Respiratory status: acceptable  Hydration status: euvolemic    PONV: none          No notable events documented.     Nurse Pain Score: 2 (NPRS)

## 2023-02-01 NOTE — OP REPORT
Preoperative diagnosis: Left carpal tunnel syndrome    Postoperative diagnosis: Same    Procedure: Left endoscopic carpal tunnel release    Surgeon: Dr. Trinh    Anesthesia: IV sedation local    Estimated blood loss: Minimal    Complication: None    Indications: Patient is a female has symptoms of carpal tunnel syndrome.  Wishes to proceed with operative release.  Risk-benefit complication alternatives explain the patient.  All questions answered no guarantees given.  Signs was obtained.    Procedure: Patient was seen the operating room laid supine table all bony problems well-padded.  Left upper extremities prepped and draped in the usual sterile fashion using ChloraPrep.  Limb was a centimeters elevation and tourniquet was raised.  Total 20 time was under 15 minutes.  The area was infiltrated with Marcaine lidocaine both without epinephrine.  A proximal incision was made through skin and subcutaneous tissues bluntly dissected.  A probe was placed underneath the transverse carpal ligament to remove any synovium.  With the wrist in extension a small incision made in the palm.  This was then removed and the scope sheath inserted in a similar fashion.  The hand was placed in the hand alcantara the scope was inserted proximally and distally.  We had good visualization of the fibers.  It was probed.  The midportion cut was made and a curved cutting knife was used to complete the cut distally.  Scope was inserted distally and proximally gained good visualization and again probed.  The remaining cut was formed with a curved cutting knife.  Found to be nice released on its entirety.  Scope sheath removed wound irrigated closed with nylon in simple fashion.  Sterile dressing Xeroform 4 x 4 soft roll bias was applied.  All needle sponge counts were correct.  Patient tolerated procedure well was transferred recovery in stable condition.

## 2023-02-13 ENCOUNTER — HOSPITAL ENCOUNTER (OUTPATIENT)
Facility: MEDICAL CENTER | Age: 43
End: 2023-02-13
Attending: NURSE PRACTITIONER
Payer: MEDICARE

## 2023-02-13 LAB — HEMOCCULT STL QL: NEGATIVE

## 2023-02-13 PROCEDURE — 82272 OCCULT BLD FECES 1-3 TESTS: CPT

## 2023-02-16 ENCOUNTER — OFFICE VISIT (OUTPATIENT)
Dept: URGENT CARE | Facility: CLINIC | Age: 43
End: 2023-02-16
Payer: MEDICARE

## 2023-02-16 VITALS
RESPIRATION RATE: 18 BRPM | OXYGEN SATURATION: 98 % | WEIGHT: 216.9 LBS | SYSTOLIC BLOOD PRESSURE: 122 MMHG | HEIGHT: 67 IN | DIASTOLIC BLOOD PRESSURE: 78 MMHG | BODY MASS INDEX: 34.04 KG/M2 | HEART RATE: 94 BPM | TEMPERATURE: 97.1 F

## 2023-02-16 DIAGNOSIS — H92.02 LEFT EAR PAIN: ICD-10-CM

## 2023-02-16 DIAGNOSIS — H66.002 NON-RECURRENT ACUTE SUPPURATIVE OTITIS MEDIA OF LEFT EAR WITHOUT SPONTANEOUS RUPTURE OF TYMPANIC MEMBRANE: ICD-10-CM

## 2023-02-16 PROCEDURE — 99213 OFFICE O/P EST LOW 20 MIN: CPT | Performed by: PHYSICIAN ASSISTANT

## 2023-02-16 RX ORDER — AMOXICILLIN AND CLAVULANATE POTASSIUM 875; 125 MG/1; MG/1
1 TABLET, FILM COATED ORAL 2 TIMES DAILY
Qty: 14 TABLET | Refills: 0 | Status: SHIPPED | OUTPATIENT
Start: 2023-02-16 | End: 2023-02-23

## 2023-02-16 ASSESSMENT — FIBROSIS 4 INDEX: FIB4 SCORE: .8461538461538461538

## 2023-02-16 NOTE — PROGRESS NOTES
Subjective:   Lyssa Leblanc is a 42 y.o. female who presents for Middle Ear Problem (X4days Left ear discomfort/)     This is a pleasant patient who presents with chief complaint of left ear pain times approximately 4 days.  She reports discomfort.  She has had previous viral URI symptoms.  No history of otitis media as an adult that is recurrent in nature.  She has not tried any medications for this.  She denies drainage or itching.        Medications:  ADVIL PO  ARIPiprazole Tabs  atorvastatin Tabs  buPROPion  CALCIUM 500 PO  carBAMazepine SR Tb12  Depakote ER Tb24  fluticasone  FOLIC ACID PO  hydrOXYzine HCl Tabs  lisinopril Tabs  mirtazapine Tabs  VITAMIN D PO    Allergies:             Patient has no known allergies.    Surgical History:         Past Surgical History:   Procedure Laterality Date    PB WRIST ARTHROSCOP,RELEASE XVERS LIG Left 1/31/2023    Procedure: LEFT ENDOSCOPIC CARPAL TUNNEL RELEASE;  Surgeon: Reid Trinh M.D.;  Location: SURGERY SAME DAY [facility];  Service: Orthopedics    TIBIA NAILING INTRAMEDULLARY Right 03/16/2022    Procedure: INSERTION, INTRAMEDULLARY PRINCESS, TIBIA;  Surgeon: Ryan Mallory M.D.;  Location: SURGERY [facility];  Service: Orthopedics    CT LAP,DIAGNOSTIC ABDOMEN N/A 09/02/2020    Procedure: PELVISCOPY;  Surgeon: Garrett Osorio M.D.;  Location: SURGERY [facility];  Service: Gynecology    SALPINGECTOMY Bilateral 09/02/2020    Procedure: SALPINGECTOMY;  Surgeon: Garrett Osorio M.D.;  Location: SURGERY [facility];  Service: Gynecology    HAND SURGERY      ORIF, ANKLE  3-16-22    for post op    TUBAL COAGULATION LAPAROSCOPIC BILATERAL         Past Social Hx:  Lyssa Leblanc  reports that she has quit smoking. Her smoking use included electronic cigarettes. She has quit using smokeless tobacco.  Her smokeless tobacco use included chew. She reports that she does not drink alcohol and does not use drugs.     Past Family Hx:   Lyssa Leblanc  "family history includes Diabetes in her maternal grandmother and mother; Heart Disease in her mother; Hypertension in her father.       Problem list, medications, and allergies reviewed by myself today in Epic.     Objective:     /78 (BP Location: Right arm, Patient Position: Sitting)   Pulse 94   Temp 36.2 °C (97.1 °F) (Temporal)   Resp 18   Ht 1.702 m (5' 7\")   Wt 98.4 kg (216 lb 14.4 oz)   LMP  (Exact Date)   SpO2 98%   BMI 33.97 kg/m²     Physical Exam  Vitals and nursing note reviewed.   Constitutional:       General: She is not in acute distress.     Appearance: Normal appearance. She is not ill-appearing or toxic-appearing.   HENT:      Head: Normocephalic.      Right Ear: Hearing and external ear normal. No decreased hearing noted. No drainage, swelling or tenderness. No foreign body. Tympanic membrane is injected. Tympanic membrane is not erythematous or bulging.      Left Ear: Hearing and external ear normal. No decreased hearing noted. No drainage, swelling or tenderness. No foreign body. Tympanic membrane is injected, erythematous and bulging.      Ears:      Comments: Left TM erythematous and bulging     Nose: Congestion and rhinorrhea present.      Mouth/Throat:      Mouth: Mucous membranes are moist.      Pharynx: Oropharynx is clear. No oropharyngeal exudate or posterior oropharyngeal erythema.      Tonsils: No tonsillar exudate.   Eyes:      General:         Right eye: No discharge.         Left eye: No discharge.      Pupils: Pupils are equal, round, and reactive to light.   Cardiovascular:      Rate and Rhythm: Normal rate and regular rhythm.      Pulses: Normal pulses.      Heart sounds: Normal heart sounds.   Pulmonary:      Effort: Pulmonary effort is normal. No respiratory distress.      Breath sounds: Normal breath sounds. No stridor or decreased air movement. No wheezing, rhonchi or rales.   Musculoskeletal:      Cervical back: Neck supple. No rigidity.   Lymphadenopathy:      " Cervical: No cervical adenopathy.   Skin:     General: Skin is warm.   Neurological:      Mental Status: She is alert.       Assessment/Plan:     Diagnosis and Associated Orders:     1. Non-recurrent acute suppurative otitis media of left ear without spontaneous rupture of tympanic membrane  - amoxicillin-clavulanate (AUGMENTIN) 875-125 MG Tab; Take 1 Tablet by mouth 2 times a day for 7 days.  Dispense: 14 Tablet; Refill: 0    2. Left ear pain        Comments/MDM:    Patient presents with otitis media and an upper respiratory infection.  The patient has a normal pulse oximetry on room air and a normal pulmonary exam.  Therefore, I feel that the likelihood of pneumonia is low.  I have recommended Tylenol and Ibuprofen for fever.  Due to the nature of the patient's symptoms I feel that this patient would benefit from antibiotics at this time.  Overall, the patient is very well appearing and is stable for discharge with medication.    I personally reviewed prior external notes and test results pertinent to today's visit.  Red flags discussed as well as indications to present to the Emergency Department.  Supportive care, natural history, differential diagnoses, and indications for immediate follow-up discussed.  Patient expresses understanding and agrees to plan.  Patient denies any other questions or concerns.    Follow-up with the primary care physician for recheck, reevaluation, and consideration of further management.      Please note that this dictation was created using voice recognition software. I have made a reasonable attempt to correct obvious errors, but I expect that there are errors of grammar and possibly content that I did not discover before finalizing the note.    This note was electronically signed by Corrie Ayon PA-C

## 2023-03-02 DIAGNOSIS — G40.209 LOCALIZATION-RELATED FOCAL EPILEPSY WITH COMPLEX PARTIAL SEIZURES (HCC): ICD-10-CM

## 2023-03-02 DIAGNOSIS — R56.9 SEIZURES (HCC): ICD-10-CM

## 2023-03-02 DIAGNOSIS — G40.909 SEIZURE DISORDER (HCC): ICD-10-CM

## 2023-03-02 RX ORDER — DIVALPROEX SODIUM 250 MG
TABLET, EXTENDED RELEASE 24 HR ORAL
Qty: 450 TABLET | Refills: 3 | Status: SHIPPED | OUTPATIENT
Start: 2023-03-02 | End: 2023-07-14 | Stop reason: SDUPTHER

## 2023-03-02 RX ORDER — DIVALPROEX SODIUM 250 MG/1
TABLET, EXTENDED RELEASE ORAL
Refills: 0 | OUTPATIENT
Start: 2023-03-02

## 2023-03-02 NOTE — TELEPHONE ENCOUNTER
Received request via: Pharmacy    Was the patient seen in the last year in this department? No    Does the patient have an active prescription (recently filled or refills available) for medication(s) requested? No    Does the patient have longterm Plus and need 100 day supply (blood pressure, diabetes and cholesterol meds only)? Patient does not have SCP

## 2023-03-08 ENCOUNTER — TELEPHONE (OUTPATIENT)
Dept: NEUROLOGY | Facility: MEDICAL CENTER | Age: 43
End: 2023-03-08
Payer: MEDICARE

## 2023-04-20 ENCOUNTER — HOSPITAL ENCOUNTER (OUTPATIENT)
Facility: MEDICAL CENTER | Age: 43
End: 2023-04-20
Attending: NURSE PRACTITIONER
Payer: MEDICARE

## 2023-04-20 LAB — HEMOCCULT STL QL: NEGATIVE

## 2023-04-20 PROCEDURE — 82272 OCCULT BLD FECES 1-3 TESTS: CPT

## 2023-05-03 ENCOUNTER — HOSPITAL ENCOUNTER (OUTPATIENT)
Facility: MEDICAL CENTER | Age: 43
End: 2023-05-03
Attending: NURSE PRACTITIONER
Payer: MEDICARE

## 2023-05-03 LAB — HEMOCCULT STL QL: NEGATIVE

## 2023-05-03 PROCEDURE — 82272 OCCULT BLD FECES 1-3 TESTS: CPT

## 2023-07-13 ENCOUNTER — PATIENT MESSAGE (OUTPATIENT)
Dept: NEUROLOGY | Facility: MEDICAL CENTER | Age: 43
End: 2023-07-13
Payer: MEDICARE

## 2023-07-13 DIAGNOSIS — G40.909 SEIZURE DISORDER (HCC): ICD-10-CM

## 2023-07-13 DIAGNOSIS — G40.209 LOCALIZATION-RELATED FOCAL EPILEPSY WITH COMPLEX PARTIAL SEIZURES (HCC): ICD-10-CM

## 2023-07-13 DIAGNOSIS — R56.9 SEIZURES (HCC): ICD-10-CM

## 2023-07-16 RX ORDER — DIVALPROEX SODIUM 250 MG
TABLET, EXTENDED RELEASE 24 HR ORAL
Qty: 450 TABLET | Refills: 3 | Status: SHIPPED | OUTPATIENT
Start: 2023-07-16 | End: 2024-07-13

## 2023-07-17 NOTE — PROGRESS NOTES
Refilled the following medication(s) below and sent it to the following pharmacy:      Requested Prescriptions     Signed Prescriptions Disp Refills    DEPAKOTE  MG TABLET SR 24  Tablet 3     Sig: TAKE 5 TABLETS BY MOUTH AT BEDTIME (NAME BRAND MEDICALLY NECESSARY).     Authorizing Provider: KEVIN JACKSON          [facility]/pharmacy #[id number] - [place], [place] - [address] AT in [place]  [address]  [address]  [facility] [address]  Phone: [phone] Fax: [phone]          Kevin Jackson MD  Department of Neurology at [facility]  General Neurologist and Epileptologist  Director of [facility]'s Level III Comprehensive Epilepsy Program  Professor of Clinical Neurology, [facility] of [facility].   Phone: [phone]  Fax: [phone]  E-mail: [email or web address]

## 2023-10-19 ENCOUNTER — OFFICE VISIT (OUTPATIENT)
Dept: NEUROLOGY | Facility: MEDICAL CENTER | Age: 43
End: 2023-10-19
Attending: STUDENT IN AN ORGANIZED HEALTH CARE EDUCATION/TRAINING PROGRAM
Payer: MEDICARE

## 2023-10-19 VITALS
BODY MASS INDEX: 36.54 KG/M2 | SYSTOLIC BLOOD PRESSURE: 106 MMHG | HEIGHT: 67 IN | WEIGHT: 232.81 LBS | TEMPERATURE: 98.1 F | DIASTOLIC BLOOD PRESSURE: 62 MMHG | HEART RATE: 84 BPM | OXYGEN SATURATION: 100 %

## 2023-10-19 DIAGNOSIS — G40.909 SEIZURE DISORDER (HCC): ICD-10-CM

## 2023-10-19 DIAGNOSIS — G81.11 RIGHT SPASTIC HEMIPLEGIA (HCC): ICD-10-CM

## 2023-10-19 DIAGNOSIS — Z83.3 FAMILY HISTORY OF DIABETES MELLITUS: ICD-10-CM

## 2023-10-19 DIAGNOSIS — G40.209 LOCALIZATION-RELATED FOCAL EPILEPSY WITH COMPLEX PARTIAL SEIZURES (HCC): ICD-10-CM

## 2023-10-19 DIAGNOSIS — G40.919 BREAKTHROUGH SEIZURE (HCC): ICD-10-CM

## 2023-10-19 DIAGNOSIS — R79.89 OTHER SPECIFIED ABNORMAL FINDINGS OF BLOOD CHEMISTRY: ICD-10-CM

## 2023-10-19 DIAGNOSIS — Z86.73 HISTORY OF STROKE: ICD-10-CM

## 2023-10-19 DIAGNOSIS — F17.200 VAPING NICOTINE DEPENDENCE, NON-TOBACCO PRODUCT: ICD-10-CM

## 2023-10-19 DIAGNOSIS — R90.89 OTHER ABNORMAL FINDINGS ON DIAGNOSTIC IMAGING OF CENTRAL NERVOUS SYSTEM: ICD-10-CM

## 2023-10-19 DIAGNOSIS — Z82.49 FAMILY HISTORY OF CARDIOVASCULAR DISEASE: ICD-10-CM

## 2023-10-19 PROCEDURE — 3074F SYST BP LT 130 MM HG: CPT | Performed by: STUDENT IN AN ORGANIZED HEALTH CARE EDUCATION/TRAINING PROGRAM

## 2023-10-19 PROCEDURE — 99212 OFFICE O/P EST SF 10 MIN: CPT | Performed by: STUDENT IN AN ORGANIZED HEALTH CARE EDUCATION/TRAINING PROGRAM

## 2023-10-19 PROCEDURE — 3078F DIAST BP <80 MM HG: CPT | Performed by: STUDENT IN AN ORGANIZED HEALTH CARE EDUCATION/TRAINING PROGRAM

## 2023-10-19 PROCEDURE — 99215 OFFICE O/P EST HI 40 MIN: CPT | Performed by: STUDENT IN AN ORGANIZED HEALTH CARE EDUCATION/TRAINING PROGRAM

## 2023-10-19 ASSESSMENT — FIBROSIS 4 INDEX: FIB4 SCORE: 0.87

## 2023-10-19 NOTE — PATIENT INSTRUCTIONS
NEUROLOGY CLINIC VISIT WITH DR. JACKSON       PATIENT EXPECTATIONS AND IMPORTANT APPOINTMENT REMINDERS:   You matter to Dr. Jackson and you deserve the best care.   Dr. Jackson prides himself on providing the best possible care to all his patients. He strives to make each appointment meaningful, so that all your concerns are being addressed and all your neurological problems are being optimally treated. In order to achieve these goals for everyone, Dr. Jackson has listed important reminders and the best ways to prepare for each appointment. Please read each item carefully. Thank you!    REFILLS:   Request refills AT LEAST 1 week in advance to ensure you do not run out of medications    [employer]  It is STRONGLY encouraged that ALL patients sign up for [employer]. It is BY FAR the fastest and most convenient way for both Dr. Jackson and patients to obtain timely refills.  If you are having trouble signing up or logging into your account, staff are available to help you. Please ask a medical assistant or staff at the  to assist you.    TEST RESULS:   All labs and diagnostic test results will be reviewed at your next visit, UNLESS  Dr. Jackson determines that there are important findings on the tests need to be acted on sooner. Dr. Jackson will either call or send a message through [employer] if this is the case.    BE PREPARED PRIOR TO EVERY APPOINTMENT:  All patient are responsible for ensuring that ALL test results that were completed outside of the [employer] system have been received by our Neurology Department PRIOR to your appointment with Dr. Jackson.    IMPORTANT:  ALL images (not just the reports) must be sent and uploaded to the [employer] system. Dr. Jackson reviews all images personally prior to each visit. Ensuring that ALL the test results and test images are accessible to Dr. Jackson prior to your appointment is YOUR responsibility and an important part of making the most out of each appointment.   Bring a  [facility] picture ID and an updated insurance card EVERY visit.  It is highly recommended that you bring at every visit a list of the most important topics that you want address. While it may not be possible to address all items on the list in a single visit, preparing a list will ensure that Dr. Jackson addresses the items that are most important to you and your health    PAPERWORK, DOCUMENTATION, LETTER REQUESTS:  You must notify the office ahead of your appointment of all paperwork or letter requests.   Please DO NOT wait until the last minute to make these requests. Please give all paperwork to the medical assistant at the start of the appointment and check-in process. Please note that Dr. Jackson may not be able complete some types of documentation in a single appointment or even within a single day or week. This is why it is important to communicate paperwork requests prior to your appointment and at least 2 weeks prior to any deadlines.    KNOW ALL YOU MEDICATIONS:   AT EVERY SINGLE APPOINTMENT, please bring a list of every single prescribed, non-prescribed, and over the counter medication or supplements you are taking, including ones taken on a rare or intermittent basis.  Include the following information for each prescribed or non-prescribed medications:  Name of medication   The strength of EACH pill/capsule/tablet, etc.   The number of pills/capsules/tablets, etc taken per dose  The number and time of day that doses are taken  For every single Supplement that you take on a routine or intermittent basis, you must include:  The Brand Name   A complete list of every single ingredient, compound, vitamin, and/or mineral in each dose, along with the corresponding amounts/strengths of all ingredients, vitamins, minerals, etc., if such information is provided or known  The number of doses taken per day and time of day doses are taken  If medications are taken on an intermittent or as needed basis, please  "estimate how many days per week or days per month the medications are used  DO NOT just print out your medication list from [employer] or bring a list from a prior appointment or hospitalizations because the information is often often unreliable, inaccurate, outdated, and/or incomplete   The list should be printed or written  If you forget or do not have a list of all the medication, then it is acceptable, although less preferred, to bring all the bottles to the appointment     ARRIVE EARLY FOR ALL VISITS:  Please note that we are unable to accommodate late arrivals as per office policy.  YOU-the patient - (NOT a parent, spouse, or friend) must be physically present at check-in no later than 12 minutes after the scheduled appointment time, or you will be asked to reschedule   Consider scheduling a virtual appointment with Dr. Jackson through [employer] as an alternative if transportation to the clinic is difficult or unavailable   Please note, however, that virtual visits can only be scheduled after being an established patient of Dr. Jackson. All new appointments must be done in-person in clinic  Some insurances will not cover the cost of virtual appointments. Please check with your insurance to find out if these visits are covered    COMMUNICATING URGENT AND NON-URGENT MATTERS:  Your concerns are important and deserve to be heard and addressed. If you have an urgent matter, there are two methods that will ensure your concerns are prioritized appropriately:   Preferred method: Sign-up/Login to your [employer] account and send a message addressed to Dr. Jackson or Meena Valera (Dr. Jackson's assistant). In the subject line, type \"urgent\" followed by a word or phrase describing the situation (For example, write \"Urgent: Out of antiseuzre med and need refill\" or \"Urgent: Severe side effects to new meds\". In doing this, our staff can ensure urgent messages are triaged appropriately and communicated to Dr. Jackson that day.  Call " [facility] main line at [phone]. Dr. Jackson's voicemail extension is [phone]. When leaving a voice message, specifically indicate if it is urgent (or non-urgent) so that the matter can be triaged appropriately and addressed in a timely manner    Thank you for taking important action in your care!

## 2023-10-19 NOTE — PROGRESS NOTES
NEUROLOGY FOLLOW-UP - 10/19/2023     REASON FOR VISIT: Lyssa Leblanc 43 y.o. female presents today for follow-up.     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 Patient had a breakthrough seizure 24 weeks ago where her right arm was jerking and then postured up. Patient knew she had a seizure. She does not thik she lost awareness. Mom and dad present for appointment witness seizure. She may be having smaller seizures one a week to week/month to month basis although it is not clear. Mother has noticed a change in her where she appears more disconnected and unfocued. She has had times where she missed a dose of her antiseizure medications here and there but does not endorse missing any recently. She has taken up vaping with nicotine. current regimen of Depakote ER 1250 mg QHS and Tegretol  mg BID. She is inconsistent with taking vitamin D and folic acid;      CURRENT MEDICATIONS AT THE TIME OF THIS ENCOUNTER:  Current Outpatient Medications on File Prior to Visit   Medication Sig Dispense Refill    carBAMazepine (TEGRETOL-XR PO) Take 200 mg by mouth 2 (two) times a day.      DEPAKOTE  MG TABLET SR 24 HR TAKE 5 TABLETS BY MOUTH AT BEDTIME (NAME BRAND MEDICALLY NECESSARY). 450 Tablet 3    atorvastatin (LIPITOR) 20 MG Tab Take 20 mg by mouth every day.      Ibuprofen (ADVIL PO) Take 1 Dose by mouth as needed.      buPROPion (WELLBUTRIN XL) 150 MG XL tablet TAKE 1 TABLET BY MOUTH EVERY DAY FOR DEPRESSION/ANXIETY      ARIPiprazole (ABILIFY) 10 MG Tab TAKE 1 TABLET BY MOUTH AT BEDTIME FOR MOOD STABILITY,CAN DO 1/2 TABLET (5MG) TWICE A DAY INSTEAD      Calcium Carbonate (CALCIUM 500 PO)       atorvastatin (LIPITOR) 10 MG Tab       mirtazapine (REMERON) 15 MG Tab       hydrOXYzine HCl (ATARAX) 50 MG Tab Take 1 Tablet by mouth 2 times a day as needed for Anxiety (for anxiety). 60 Tablet 2  "   lisinopril (PRINIVIL) 10 MG Tab TAKE 1 TABLET BY MOUTH EVERY DAY IN THE MORNING 30 Tablet 5    VITAMIN D PO Take 1 Tablet by mouth every day.       No current facility-administered medications on file prior to visit.        EXAM:   Encounter Vitals  Standard Vitals  Vitals  Blood Pressure: 106/62  Temperature: 36.7 °C (98.1 °F)  Temp src: Temporal  Pulse: 84  Pulse Oximetry: 100 %  Height: 170.2 cm (5' 7\")  Weight: 106 kg (232 lb 12.9 oz)  Encounter Vitals  Temperature: 36.7 °C (98.1 °F)  Temp src: Temporal  Blood Pressure: 106/62  Pulse: 84  Pulse Oximetry: 100 %  Weight: 106 kg (232 lb 12.9 oz)  Height: 170.2 cm (5' 7\")  BMI (Calculated): 36.46  Pulmonary-Specific Vitals     Durable Medical Equipment-Specific Vitals                  Physical Exam:  Physical Exam  Vitals reviewed.   Constitutional:       General: She is awake.      Appearance: Normal appearance.   HENT:      Nose: Nose normal.      Mouth/Throat:      Mouth: Mucous membranes are dry.      Pharynx: Oropharynx is clear. No oropharyngeal exudate or posterior oropharyngeal erythema.   Eyes:      General: Lids are normal.         Right eye: No discharge.         Left eye: No discharge.      Extraocular Movements: Extraocular movements intact.      Pupils: Pupils are equal, round, and reactive to light.   Pulmonary:      Effort: No respiratory distress.      Breath sounds: No stridor. No wheezing or rhonchi.   Musculoskeletal:         General: No swelling, tenderness or deformity.      Cervical back: Normal range of motion.      Comments: S/p R knee surgery. Healed scars from surgery\     Skin:     Coloration: Skin is not jaundiced.      Findings: No bruising, erythema, lesion or rash.   Neurological:      Mental Status: She is alert.      Cranial Nerves: Dysarthria present.        Neurological Exam   Neurological Exam  Mental Status  Awake and alert. Mild dysarthria present.    Cranial Nerves  CN III, IV, VI: Extraocular movements intact bilaterally. " Normal lids and orbits bilaterally. Pupils equal round and reactive to light bilaterally.  CN V: Facial sensation is normal.  CN VII: Full and symmetric facial movement.    Motor  Decreased muscle bulk throughout. Increased muscle tone. RUE. Strength is 5/5 in all four extremities except as noted.  Right UE weakness 4/5 proximal muscles, 3/5 distal muscle secondary to stroke as a child.    Sensory  Sensation intact b/l..    Reflexes  Reflexes pathologically brisk R hemibody.    Coordination  Right: Finger-to-nose normal.Left: Finger-to-nose normal.    Gait  Casual gait: Antalgic gait.       PERTINENT NOTES AND DIAGNOSTICS DATA PERSONALLY REVIEW:      ASSESSMENT, EDUCATION, AND COUNSELING:  This is a 43 y.o. female patient who presents to the neurology clinic. We had an extensive discussion about the patient's symptoms, signs, and work-up to date, if any. We discussed potential and/or definitive diagnoses, work-up, and potential treatments.       PLAN:  If applicable, the work-up such as labs, imaging, procedures, and/or other testing, referrals, and/or recommended treatment strategies are listed below.    Visit Diagnoses     ICD-10-CM   1. Seizure disorder (HCC)  G40.909   2. 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 Right spastic hemiplegia (HCC)  G81.11   4. History of stroke  Z86.73   5. Family history of diabetes mellitus  Z83.3   6. Family history of cardiovascular disease  Z82.49   7. Breakthrough seizure (HCC)  G40.919   8. Other abnormal findings on diagnostic imaging of central nervous system  R90.89   9. Other specified abnormal findings of blood chemistry  R79.89   10. 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 Seizures with recent breakthrough seizures. She needs updated 24 hour ambulatory EEG to evalaute for unrecognized seizures and quanitfy the degree of epileptic burden. For now she will continue current AED regimen of Depakote ER 1250 mg QHS and Tegretol  mg BID. She needs updated labs as above. She should continue folic acid supplementation. She should take vitamin D 2000 U/day if her levels are normal; 5000 U/day if her levels are low.       She needs to stop vaping especially with nicotine as both are unsafe/unhealthy and may be affecting her risk for seizures. Counseling provided. We will f/u on this.     BILLING DOCUMENTATION:     I spent a total of I spent a total of 45 minutes on the day of the visit.   of face-to-face time in this visit. Over 50% of the time of the visit today was spent on counseling and/or coordination of care wtih the patient and/or family, as above in assessment in plan.    Shiva Jackson MD  Department of Neurology at [facility]  Diplomate of the American Board of Psychiatry and Neurology, General Neurology  Diplomate of American Board of Psychiatry and Neurology, a Member Board of the American Board of Medical Subspecialties, Epilepsy  Director of [facility]'s Level III Comprehensive Epilepsy Program  Professor of Clinical Neurology, [facility] of [facility].   [address], [address]  [facility] [id number] [phone]   Fax: [phone]  E-mail: [email or web address]

## 2023-11-29 DIAGNOSIS — G40.909 SEIZURE DISORDER (HCC): ICD-10-CM

## 2023-11-29 DIAGNOSIS — G40.209 LOCALIZATION-RELATED FOCAL EPILEPSY WITH COMPLEX PARTIAL SEIZURES (HCC): ICD-10-CM

## 2023-11-29 DIAGNOSIS — R56.9 SEIZURES (HCC): ICD-10-CM

## 2023-11-29 RX ORDER — CARBAMAZEPINE 400 MG/1
400 TABLET, EXTENDED RELEASE ORAL 2 TIMES DAILY
Qty: 180 TABLET | Refills: 3 | Status: SHIPPED | OUTPATIENT
Start: 2023-11-29

## 2023-11-30 ENCOUNTER — HOSPITAL ENCOUNTER (OUTPATIENT)
Dept: LAB | Facility: MEDICAL CENTER | Age: 43
End: 2023-11-30
Attending: STUDENT IN AN ORGANIZED HEALTH CARE EDUCATION/TRAINING PROGRAM
Payer: MEDICARE

## 2023-11-30 DIAGNOSIS — G40.919 BREAKTHROUGH SEIZURE (HCC): ICD-10-CM

## 2023-11-30 DIAGNOSIS — G40.909 SEIZURE DISORDER (HCC): ICD-10-CM

## 2023-11-30 DIAGNOSIS — G40.209 LOCALIZATION-RELATED FOCAL EPILEPSY WITH COMPLEX PARTIAL SEIZURES (HCC): ICD-10-CM

## 2023-11-30 DIAGNOSIS — R90.89 OTHER ABNORMAL FINDINGS ON DIAGNOSTIC IMAGING OF CENTRAL NERVOUS SYSTEM: ICD-10-CM

## 2023-11-30 DIAGNOSIS — R79.89 OTHER SPECIFIED ABNORMAL FINDINGS OF BLOOD CHEMISTRY: ICD-10-CM

## 2023-11-30 LAB
AMMONIA PLAS-SCNC: 39 UMOL/L (ref 11–45)
BASOPHILS # BLD AUTO: 1.5 % (ref 0–1.8)
BASOPHILS # BLD: 0.06 K/UL (ref 0–0.12)
CARBAMAZEPINE SERPL-MCNC: 7 UG/ML (ref 4–12)
EOSINOPHIL # BLD AUTO: 0.13 K/UL (ref 0–0.51)
EOSINOPHIL NFR BLD: 3.3 % (ref 0–6.9)
ERYTHROCYTE [DISTWIDTH] IN BLOOD BY AUTOMATED COUNT: 42.2 FL (ref 35.9–50)
EST. AVERAGE GLUCOSE BLD GHB EST-MCNC: 103 MG/DL
HBA1C MFR BLD: 5.2 % (ref 4–5.6)
HCT VFR BLD AUTO: 37 % (ref 37–47)
HGB BLD-MCNC: 11.9 G/DL (ref 12–16)
IMM GRANULOCYTES # BLD AUTO: 0.01 K/UL (ref 0–0.11)
IMM GRANULOCYTES NFR BLD AUTO: 0.3 % (ref 0–0.9)
LYMPHOCYTES # BLD AUTO: 1.69 K/UL (ref 1–4.8)
LYMPHOCYTES NFR BLD: 43.4 % (ref 22–41)
MCH RBC QN AUTO: 28.5 PG (ref 27–33)
MCHC RBC AUTO-ENTMCNC: 32.2 G/DL (ref 32.2–35.5)
MCV RBC AUTO: 88.7 FL (ref 81.4–97.8)
MONOCYTES # BLD AUTO: 0.28 K/UL (ref 0–0.85)
MONOCYTES NFR BLD AUTO: 7.2 % (ref 0–13.4)
NEUTROPHILS # BLD AUTO: 1.72 K/UL (ref 1.82–7.42)
NEUTROPHILS NFR BLD: 44.3 % (ref 44–72)
NRBC # BLD AUTO: 0 K/UL
NRBC BLD-RTO: 0 /100 WBC (ref 0–0.2)
PLATELET # BLD AUTO: 157 K/UL (ref 164–446)
PMV BLD AUTO: 13.7 FL (ref 9–12.9)
RBC # BLD AUTO: 4.17 M/UL (ref 4.2–5.4)
VALPROATE SERPL-MCNC: 57.1 UG/ML (ref 50–100)
WBC # BLD AUTO: 3.9 K/UL (ref 4.8–10.8)

## 2023-11-30 PROCEDURE — 36415 COLL VENOUS BLD VENIPUNCTURE: CPT

## 2023-11-30 PROCEDURE — 83036 HEMOGLOBIN GLYCOSYLATED A1C: CPT | Mod: GA

## 2023-11-30 PROCEDURE — 82140 ASSAY OF AMMONIA: CPT

## 2023-11-30 PROCEDURE — 82607 VITAMIN B-12: CPT

## 2023-11-30 PROCEDURE — 82306 VITAMIN D 25 HYDROXY: CPT | Mod: GA

## 2023-11-30 PROCEDURE — 80053 COMPREHEN METABOLIC PANEL: CPT

## 2023-11-30 PROCEDURE — 80156 ASSAY CARBAMAZEPINE TOTAL: CPT

## 2023-11-30 PROCEDURE — 85025 COMPLETE CBC W/AUTO DIFF WBC: CPT

## 2023-11-30 PROCEDURE — 82746 ASSAY OF FOLIC ACID SERUM: CPT

## 2023-11-30 PROCEDURE — 80164 ASSAY DIPROPYLACETIC ACD TOT: CPT

## 2023-11-30 PROCEDURE — 84443 ASSAY THYROID STIM HORMONE: CPT

## 2023-12-01 LAB
25(OH)D3 SERPL-MCNC: 42 NG/ML (ref 30–100)
ALBUMIN SERPL BCP-MCNC: 3.9 G/DL (ref 3.2–4.9)
ALBUMIN/GLOB SERPL: 1.5 G/DL
ALP SERPL-CCNC: 80 U/L (ref 30–99)
ALT SERPL-CCNC: 15 U/L (ref 2–50)
ANION GAP SERPL CALC-SCNC: 11 MMOL/L (ref 7–16)
AST SERPL-CCNC: 16 U/L (ref 12–45)
BILIRUB SERPL-MCNC: 0.2 MG/DL (ref 0.1–1.5)
BUN SERPL-MCNC: 12 MG/DL (ref 8–22)
CALCIUM ALBUM COR SERPL-MCNC: 8.7 MG/DL (ref 8.5–10.5)
CALCIUM SERPL-MCNC: 8.6 MG/DL (ref 8.5–10.5)
CHLORIDE SERPL-SCNC: 110 MMOL/L (ref 96–112)
CO2 SERPL-SCNC: 23 MMOL/L (ref 20–33)
CREAT SERPL-MCNC: 0.92 MG/DL (ref 0.5–1.4)
FOLATE SERPL-MCNC: 5.2 NG/ML
GFR SERPLBLD CREATININE-BSD FMLA CKD-EPI: 79 ML/MIN/1.73 M 2
GLOBULIN SER CALC-MCNC: 2.6 G/DL (ref 1.9–3.5)
GLUCOSE SERPL-MCNC: 90 MG/DL (ref 65–99)
POTASSIUM SERPL-SCNC: 3.9 MMOL/L (ref 3.6–5.5)
PROT SERPL-MCNC: 6.5 G/DL (ref 6–8.2)
SODIUM SERPL-SCNC: 144 MMOL/L (ref 135–145)
TSH SERPL DL<=0.005 MIU/L-ACNC: 3.35 UIU/ML (ref 0.38–5.33)
VIT B12 SERPL-MCNC: 673 PG/ML (ref 211–911)

## 2023-12-12 DIAGNOSIS — G40.209 LOCALIZATION-RELATED FOCAL EPILEPSY WITH COMPLEX PARTIAL SEIZURES (HCC): ICD-10-CM

## 2023-12-12 DIAGNOSIS — G81.11 RIGHT SPASTIC HEMIPLEGIA (HCC): ICD-10-CM

## 2024-01-04 ENCOUNTER — NON-PROVIDER VISIT (OUTPATIENT)
Dept: NEUROLOGY | Facility: MEDICAL CENTER | Age: 44
End: 2024-01-04
Attending: STUDENT IN AN ORGANIZED HEALTH CARE EDUCATION/TRAINING PROGRAM
Payer: MEDICARE

## 2024-01-04 DIAGNOSIS — G81.11 RIGHT SPASTIC HEMIPLEGIA (HCC): ICD-10-CM

## 2024-01-04 DIAGNOSIS — G40.209 LOCALIZATION-RELATED FOCAL EPILEPSY WITH COMPLEX PARTIAL SEIZURES (HCC): ICD-10-CM

## 2024-01-04 PROCEDURE — 95719 EEG PHYS/QHP EA INCR W/O VID: CPT | Performed by: STUDENT IN AN ORGANIZED HEALTH CARE EDUCATION/TRAINING PROGRAM

## 2024-01-04 PROCEDURE — 95700 EEG CONT REC W/VID EEG TECH: CPT | Performed by: STUDENT IN AN ORGANIZED HEALTH CARE EDUCATION/TRAINING PROGRAM

## 2024-01-04 PROCEDURE — 95708 EEG WO VID EA 12-26HR UNMNTR: CPT | Performed by: STUDENT IN AN ORGANIZED HEALTH CARE EDUCATION/TRAINING PROGRAM

## 2024-01-04 NOTE — PROCEDURES
AMBULATORY ELECTROENCEPHALOGRAM REPORT      REFERRING PROVIDER:    Dee Dee Roberson A.P.R.NEfren  [address] [place] [address]  DOS: 01/04/2024   DURATION OF RECORDING: (23 hours and 20 minutes)    INDICATION:  Lyssa Leblanc 43 y.o. female presenting with  seizure(s)    CURRENT OUTPATIENT MEDICATION LIST:     Home Medications    Medication Sig Taking? Last Dose Authorizing Provider   carBAMazepine SR (TEGRETOL XR) 400 MG TABLET SR 12 HR TAKE 1 TABLET BY MOUTH 2 TIMES A DAY   Shiva Jackson M.D.   omeprazole (PRILOSEC) 20 MG delayed-release capsule TAKE 1 CAPSULE BY MOUTH EVERY DAY FOR ACID REFLUX   Physician Outpatient   omeprazole (PRILOSEC) 40 MG delayed-release capsule TAKE 1 CAPSULE EVERY DAY FOR ACID REFLUX. STARTDATE : 2023-10-20   Physician Outpatient   carBAMazepine (TEGRETOL-XR PO) Take 200 mg by mouth 2 (two) times a day.   Physician Outpatient   DEPAKOTE  MG TABLET SR 24 HR TAKE 5 TABLETS BY MOUTH AT BEDTIME (NAME BRAND MEDICALLY NECESSARY).   Shiva Jackson M.D.   atorvastatin (LIPITOR) 20 MG Tab Take 20 mg by mouth every day.   Physician Outpatient   Ibuprofen (ADVIL PO) Take 1 Dose by mouth as needed.   Physician Outpatient   buPROPion (WELLBUTRIN XL) 150 MG XL tablet TAKE 1 TABLET BY MOUTH EVERY DAY FOR DEPRESSION/ANXIETY   Physician Outpatient   ARIPiprazole (ABILIFY) 10 MG Tab TAKE 1 TABLET BY MOUTH AT BEDTIME FOR MOOD STABILITY,CAN DO 1/2 TABLET (5MG) TWICE A DAY INSTEAD   Physician Outpatient   Calcium Carbonate (CALCIUM 500 PO)    Physician Outpatient   atorvastatin (LIPITOR) 10 MG Tab    Physician Outpatient   mirtazapine (REMERON) 15 MG Tab    Physician Outpatient   hydrOXYzine HCl (ATARAX) 50 MG Tab Take 1 Tablet by mouth 2 times a day as needed for Anxiety (for anxiety).   Jeff Pettit M.D.   lisinopril (PRINIVIL) 10 MG Tab TAKE 1 TABLET BY MOUTH EVERY DAY IN THE MORNING   Ileana Araya P.A.-C.   VITAMIN D PO Take 1 Tablet by mouth every day.   Physician  Outpatient          TECHNIQUE:   The EEG was set up and taken down by a Neurodiagnostic technologist who performed education to patient and staff.  A minimum but not limited to 23 electrodes and 23 channel recording was setup and performed by Neurodiagnostic technologist. Impedances, electrode integrity, and technical impressions were documented a minimum of every 2-24 hour period by a Neurodiagnostic Technologist and reviewed by Interpreting Physician.    DESCRIPTION OF THE RECORD:  During maximal wakefulness, the background was continuous, asymmetrical, and showed a 9 Hz posterior dominant rhythm.  Reactivity and state changes were present.  During drowsiness, theta/delta frequencies were seen.    Sleep was captured and was characterized by diffuse background delta/theta activity with a loss of myogenic artifact.  N2 sleep transients in the form of sleep spindles and vertex waves were seen in the leads over the central regions, almost exclusively over the right hemisphere and not the left. .     ICTAL AND INTERICTAL FINDINGS:     Abundant to nearly continuous subtle left hemispheric slowing and attenuation sparing the left occipital region.  Rarely, brief 1.5 to 3.5 Hz rhythmic or quasi-rhythmic  slowing, at times sharply contoured, were seen maximal over the left frontal central temporal regions lasting 3 to 8 seconds on average. Rare to occasional left temporal sharp waves, some but not all of which were suspiciously epileptiform.  Also, very rare runs of low voltage fast spiky activity (LVFA) maximal over the left temporal derivations, a finding that may reflect focal irritability and represent an interictal phenomenon similar to spikes and sharp waves.    One probable clinical seizure with a time locked abnormal EEG accompaniment that is detailed below.      ACTIVATION PROCEDURES:   Intermittent Photic stimulation was performed in a stepwise fashion from 1 to 30 Hz and did not elicited any abnormalities on  "EEG.     EKG: Sampling of the EKG recording did not demonstrate any abnormalities    EVENTS:  Push button events and/or ambulatory diary events:     1141 - patient reports a \"seizure\" - The EEG is abnormal here. There is subtle rhythmic 2-3 Hz sharply contoured slowing arising from the left posterior temporal region which appears to quickly generalized yet remains maximal over the left>right hemispheres (pictured below). A couple of left temporal sharp waves are embedded in the left temporal region as well. The high frequency filter was set to 15 in order to see the slowing which was largely obscured by generalized spiky beta activity with an anterior predominance. The abnormal findings do not quite meet the electrographic definition of a seizure but in light of the time-locked clinical accompaniment where patient reports a typical seizure this is likely a brief electro-clinical seizure.    .    INTERPRETATION:  Abnormal ambulatory EEG recording in the awake and drowsy/sleep state(s):  -One focal left hemispheric onset clinical seizure with an abnormal EEG accompaniment. The patient reported one typical seizure at 11:41 AM. This clinical event did, in fact, have a time-locked abnormal EEG accompaniment characterized by left temporal>left centro-parietal rhythmic 1.5-3 Hz slowing with subtle, fragmented contralateral spread. Patient did not describe her symptoms; she only reported that she had a typical \"seizure\" during this time. No other clinical events were reported. No other seizures, clinically or electrographically, were seen or reported. Interictally, there was abundant to nearly continuous subtle left hemispheric slowing and attenuation which spared the left occipital region.  Rarely seen were brief runs of 1.5 to 3.5 Hz rhythmic or quasi-rhythmic  slowing, which at times became sharply contoured, and which were maximally seen over the left frontal-central-temporal regions. These focal rhythmic runs lasted 3 " to 8 seconds on average. In addition, Rare to occasional left temporal sharp waves were seen, some, but not all, of which were suspiciously epileptiform.  Finally, very rare runs of low voltage fast spiky activity (LVFA) were seen maximally over the left temporal derivations, a another finding that may reflect focal irritability and propensity towards focal seizures.   -Taken together, these findings represent broad left hemispheric cortical and subcortical dysfunction and structural damage, as well as a high propensity towards focal seizures over the left hemisphere. Clinical and radiographic correlation advised.         Shiva Jackson MD  Department of Neurology at [facility]  General Neurologist and Epileptologist  Director of [facility]'s Level III Comprehensive Epilepsy Program  Professor of Clinical Neurology, [facility] of [facility].   Phone: [phone]  Fax: [phone]  E-mail: [email or web address]

## 2024-01-05 ENCOUNTER — NON-PROVIDER VISIT (OUTPATIENT)
Dept: NEUROLOGY | Facility: MEDICAL CENTER | Age: 44
End: 2024-01-05
Attending: STUDENT IN AN ORGANIZED HEALTH CARE EDUCATION/TRAINING PROGRAM
Payer: MEDICARE

## 2024-01-05 PROCEDURE — 99999 PR NO CHARGE: CPT | Performed by: STUDENT IN AN ORGANIZED HEALTH CARE EDUCATION/TRAINING PROGRAM

## 2024-02-21 ENCOUNTER — OFFICE VISIT (OUTPATIENT)
Dept: NEUROLOGY | Facility: MEDICAL CENTER | Age: 44
End: 2024-02-21
Attending: STUDENT IN AN ORGANIZED HEALTH CARE EDUCATION/TRAINING PROGRAM
Payer: MEDICARE

## 2024-02-21 VITALS
OXYGEN SATURATION: 97 % | TEMPERATURE: 97.5 F | HEART RATE: 72 BPM | HEIGHT: 67 IN | BODY MASS INDEX: 35.78 KG/M2 | WEIGHT: 227.96 LBS | DIASTOLIC BLOOD PRESSURE: 64 MMHG | SYSTOLIC BLOOD PRESSURE: 110 MMHG

## 2024-02-21 DIAGNOSIS — Z83.3 FAMILY HISTORY OF DIABETES MELLITUS: ICD-10-CM

## 2024-02-21 DIAGNOSIS — F43.10 POST TRAUMATIC STRESS DISORDER: ICD-10-CM

## 2024-02-21 DIAGNOSIS — Z86.73 HISTORY OF STROKE: ICD-10-CM

## 2024-02-21 DIAGNOSIS — F17.200 VAPING NICOTINE DEPENDENCE, NON-TOBACCO PRODUCT: ICD-10-CM

## 2024-02-21 DIAGNOSIS — G40.209 LOCALIZATION-RELATED FOCAL EPILEPSY WITH COMPLEX PARTIAL SEIZURES (HCC): ICD-10-CM

## 2024-02-21 DIAGNOSIS — Z82.49 FAMILY HISTORY OF CARDIOVASCULAR DISEASE: ICD-10-CM

## 2024-02-21 DIAGNOSIS — F41.9 ANXIETY: ICD-10-CM

## 2024-02-21 DIAGNOSIS — F81.9 LEARNING DISABILITY: ICD-10-CM

## 2024-02-21 DIAGNOSIS — G40.909 SEIZURE DISORDER (HCC): ICD-10-CM

## 2024-02-21 DIAGNOSIS — M85.80 OSTEOPENIA, UNSPECIFIED LOCATION: ICD-10-CM

## 2024-02-21 DIAGNOSIS — R79.89 LOW VITAMIN D LEVEL: ICD-10-CM

## 2024-02-21 DIAGNOSIS — F39 MOOD DISORDER (HCC): ICD-10-CM

## 2024-02-21 DIAGNOSIS — F33.9 EPISODE OF RECURRENT MAJOR DEPRESSIVE DISORDER, UNSPECIFIED DEPRESSION EPISODE SEVERITY (HCC): ICD-10-CM

## 2024-02-21 DIAGNOSIS — G40.919 BREAKTHROUGH SEIZURE (HCC): ICD-10-CM

## 2024-02-21 DIAGNOSIS — G81.11 RIGHT SPASTIC HEMIPLEGIA (HCC): ICD-10-CM

## 2024-02-21 PROCEDURE — 3078F DIAST BP <80 MM HG: CPT | Performed by: STUDENT IN AN ORGANIZED HEALTH CARE EDUCATION/TRAINING PROGRAM

## 2024-02-21 PROCEDURE — 99212 OFFICE O/P EST SF 10 MIN: CPT | Performed by: STUDENT IN AN ORGANIZED HEALTH CARE EDUCATION/TRAINING PROGRAM

## 2024-02-21 PROCEDURE — 99213 OFFICE O/P EST LOW 20 MIN: CPT | Performed by: STUDENT IN AN ORGANIZED HEALTH CARE EDUCATION/TRAINING PROGRAM

## 2024-02-21 PROCEDURE — 3074F SYST BP LT 130 MM HG: CPT | Performed by: STUDENT IN AN ORGANIZED HEALTH CARE EDUCATION/TRAINING PROGRAM

## 2024-02-21 RX ORDER — ARIPIPRAZOLE 15 MG/1
15 TABLET ORAL DAILY
COMMUNITY
Start: 2024-01-29

## 2024-02-21 RX ORDER — BUPROPION HCL 300 MG
300 TABLET, EXTENDED RELEASE 24 HR ORAL EVERY MORNING
COMMUNITY
Start: 2024-01-29

## 2024-02-21 ASSESSMENT — FIBROSIS 4 INDEX: FIB4 SCORE: 1.13

## 2024-02-21 NOTE — PROGRESS NOTES
"NEUROLOGY FOLLOW-UP - 02/21/2024     REASON FOR VISIT: Lyssa Leblanc 43 y.o. female presents today for follow-up       SUMMARY RELEVANT PAST MEDICAL HISTORY AND/OR COMPENDIUM OF RELEVANT WORK-UP AND TREATMENTS TO DATE:      INTERVAL HISTORY:  Patient returns for follow-up. She does not recall having a seizure since she last spoke although she is not sure. She had ambulatory EEG which showed a left posterior temporal seizures and abundant left-sided discharges throughout the study indicating a highly irritated brain with a high risk for ongoing seizures.     She reports some medication changes since she last spoke. Notably, her Wellbutrin 300 mg XR    Current AEDs  Depakote ER 1250 mg QHS and Tegretol  mg BID     CURRENT MEDICATIONS AT THE TIME OF THIS ENCOUNTER:  Current Outpatient Medications on File Prior to Visit   Medication Sig Dispense Refill    ABILIFY 15 MG Tab Take 15 mg by mouth every day.      WELLBUTRIN  MG XL tablet Take 300 mg by mouth every morning.      SEROQUEL 100 MG Tab       carBAMazepine SR (TEGRETOL XR) 400 MG TABLET SR 12 HR TAKE 1 TABLET BY MOUTH 2 TIMES A  Tablet 3    DEPAKOTE  MG TABLET SR 24 HR TAKE 5 TABLETS BY MOUTH AT BEDTIME (NAME BRAND MEDICALLY NECESSARY). 450 Tablet 3    atorvastatin (LIPITOR) 20 MG Tab Take 20 mg by mouth every day.      Calcium Carbonate (CALCIUM 500 PO)       hydrOXYzine HCl (ATARAX) 50 MG Tab Take 1 Tablet by mouth 2 times a day as needed for Anxiety (for anxiety). 60 Tablet 2    lisinopril (PRINIVIL) 10 MG Tab TAKE 1 TABLET BY MOUTH EVERY DAY IN THE MORNING 30 Tablet 5    VITAMIN D PO Take 1 Tablet by mouth every day.       No current facility-administered medications on file prior to visit.          EXAM:   /64 (BP Location: Left arm, Patient Position: Sitting, BP Cuff Size: Adult)   Pulse 72   Temp 36.4 °C (97.5 °F) (Temporal)   Ht 1.702 m (5' 7.01\")   Wt 103 kg (227 lb 15.3 oz)   SpO2 97%    Wt Readings from Last 5 " Encounters:   02/21/24 103 kg (227 lb 15.3 oz)   02/13/24 106 kg (232 lb 12.9 oz)   10/19/23 106 kg (232 lb 12.9 oz)   02/16/23 98.4 kg (216 lb 14.4 oz)   01/31/23 97.6 kg (215 lb 2.7 oz)      Physical Exam:  Physical Exam  Vitals reviewed.   Constitutional:       General: She is awake.      Appearance: Normal appearance.   HENT:      Nose: Nose normal.      Mouth/Throat:      Mouth: Mucous membranes are dry.      Pharynx: Oropharynx is clear. No oropharyngeal exudate or posterior oropharyngeal erythema.   Eyes:      General: Lids are normal.         Right eye: No discharge.         Left eye: No discharge.      Extraocular Movements: Extraocular movements intact.      Pupils: Pupils are equal, round, and reactive to light.   Pulmonary:      Effort: No respiratory distress.      Breath sounds: No stridor. No wheezing or rhonchi.   Musculoskeletal:         General: No swelling, tenderness or deformity.      Cervical back: Normal range of motion.      Comments: S/p R knee surgery. Healed scars from surgery\     Skin:     Coloration: Skin is not jaundiced.      Findings: No bruising, erythema, lesion or rash.   Neurological:      Mental Status: She is alert.      Cranial Nerves: Dysarthria present.        Neurological Exam   Neurological Exam  Mental Status  Awake and alert. Mild dysarthria present.    Cranial Nerves  CN III, IV, VI: Extraocular movements intact bilaterally. Normal lids and orbits bilaterally. Pupils equal round and reactive to light bilaterally.  CN V: Facial sensation is normal.  CN VII: Full and symmetric facial movement.    Motor  Decreased muscle bulk throughout. Increased muscle tone. RUE. Strength is 5/5 in all four extremities except as noted.  Right UE weakness 4/5 proximal muscles, 3/5 distal muscle secondary to stroke as a child.    Sensory  Sensation intact b/l..    Reflexes  Reflexes pathologically brisk R hemibody.    Coordination  Right: Finger-to-nose normal.Left: Finger-to-nose  normal.    Gait  Casual gait: Antalgic gait.         ASSESSMENT, EDUCATION, AND COUNSELING:  This is a 43 y.o. female patient who presents to the neurology clinic. We had an extensive discussion about the patient's symptoms, signs, and work-up to date, if any. We discussed potential and/or definitive diagnoses, work-up, and potential treatments.     PLAN:  If applicable, the work-up such as labs, imaging, procedures, and/or other testing, referrals, and/or recommended treatment strategies are listed below.    Medications were administered today in clinic (if any):     Visit Diagnoses     ICD-10-CM   1. Localization-related focal epilepsy with complex partial seizures (HCC)  G40.209   2. Right spastic hemiplegia (HCC)  G81.11   3. Seizure disorder (HCC)  G40.909   4. Family history of diabetes mellitus  Z83.3   5. History of stroke  Z86.73   6. Family history of cardiovascular disease  Z82.49   7. Breakthrough seizure (HCC)  G40.919   8. Vaping nicotine dependence, non-tobacco product  F17.200   9. Episode of recurrent major depressive disorder, unspecified depression episode severity ([facility])  F33.9   10. Osteopenia, unspecified location  M85.80   11. Post traumatic stress disorder  F43.10   12. Learning disability  F81.9   13. Mood disorder ([facility])  F39   14. Anxiety  F41.9   15. Low vitamin D level  R79.89      Orders Placed This Encounter    ABILIFY 15 MG Tab    WELLBUTRIN  MG XL tablet        Patient with intractable focal epilepsy with a recent focal seizure on ambulatory EEG and a highly active interictal epileptiform activity over the left temporal region suggesting an ongoing imminent risk for seizures. I recommend she is taper off of Wellbutrin as this certainly could be lowering seizure threshold. It does so I a dose-dependent fashion, especially with total daily doses of >200 mg and even with the extended release formulations. She will follow-up with her psychiatrist next week. I recommend she does  alternatives to Wellbutrin. Follow-up in 2-3 months.        BILLING DOCUMENTATION:     The number of minutes of face-to-face time spent in this encounter was I spent a total of 25 minutes on the day of the visit.  . Over 50% of the time of the visit today was spent on counseling and/or coordination of care wtih the patient and/or family, as outlined above in assessment in plan.    Shiva Jackson MD  Department of Neurology at [facility]  Diplomate of the American Board of Psychiatry and Neurology, General Neurology  Diplomate of American Board of Psychiatry and Neurology, a Member Board of the American Board of Medical Subspecialties, Epilepsy  Director of [facility]'s Level III Comprehensive Epilepsy Program  Professor of Clinical Neurology, [facility].   [address] [facility], [address]  [facility] [id number] [phone]   Fax: [phone]  E-mail: [email or web address]

## 2024-02-21 NOTE — PATIENT INSTRUCTIONS
NEUROLOGY CLINIC VISIT WITH DR. JACKSON     PLEASE READ THIS ENTIRE DOCUMENT CAREFULLY AND COMPLETELY:    First and foremost, you matter to Dr. Jackson and you deserve the best care.   Dr. Jackson prides himself on providing the best possible care to all his patients. He strives to make each appointment meaningful, so that all your concerns are being addressed and all your neurological problems are being optimally treated. In order to achieve these goals for everyone, Dr. Jackson has listed important reminders and the best ways to prepare for each appointment. Please read each item carefully. Thank you!    Due to the high volume of patients we are trying to help, your physician will not be able to respond by phone or in [employer] to your routine concerns between appointments.  This does not reflect a lack of interest or concern for you or your diagnosis.  Please bring these questions and concerns to your appointment where your physician can answer.  Please relay more pressing concerns to our office, either via [employer], or by phone; if not able to reach us please visit nearby Urgent Care Center or Emergency Department.  If any emergent medical needs, please seek emergent medical help and/or call 911.    Also, please note that we are not able to fill out paperwork that might be related to your work, utility company, disability, and/or driving, among others, in between the visits.  Please schedule a dedicated appointment to address any and all paperwork.  This is not due to lack of concern or interest for your disease-related work/administrative problems, but to make sure that we provide the best possible care and to fill out your paperwork in a correct, complete, and timely manner.  ------------------------------------------------------------------------------------------  Please let our office know if you have any changes in your seizure frequency and/characteristics.     Please keep a diary of your seizures and bring it  with you to each appointment.    Please take vitamin D3 [id number] internation units daily.     Please abstain from driving until further notice    If you are a biological female with epilepsy who is of reproductive age, who is actively breastfeeding, and/or who infants/young children:  Please take folic acid 1 mg daily. This is an over-the-counter supplement that is recommended to prevent certain developmental problems in your baby, in case you become pregnant in the future.  It is critical that you let our office know as soon as you become pregnant or plan to become pregnant.  If you are caring for a baby/young child, please make sure to be sitting on a soft surface while holding your baby/young child, so in case you have a seizure, your baby/young child is not injured due to fall.   Please let us know if, while breastfeeding, you observe that your baby is excessively sleepy and/or has other behavioral changes. Because many antiseizure medications are collected in breast milk, some nursing babies can suffer adverse medication effects.    Please note that the following might precipitate seizures:   missed doses of antiseizure medications  being sick with a fever, stress  Fatigue  sleep deprivation or abnormal sleeping patterns  not eating regularly  not drinking enough water  drinking too much alcohol  stopping alcohol suddenly if you are currently using it on a regular/daily basis,   using recreational drugs, among others.    Please note that the following might lead to an injury or even be life-threatening in the event you have a seizure and/or lose awareness while:  being in a large body of water by yourself, such as bath, pool, lake, ocean, among others (risk of drowning)  being on unprotected heights (risk of fall)  being around and/or operating heavy machinery (risk of injury)  being around open fire/hot surfaces (risk of burns)  any other activities/circumstances, in which if you lose awareness, you might  injure yourself and/or others.  -------------------------------------------------------------------------------------------  SUDEP (SUDDEN UNEXPECTED DEATH IN EPILEPSY)  It is important that your seizures are well controlled and you have none or have them rarely. In addition to avoiding injury related to breakthrough seizures, frequent seizures increase risk of SUDEP (sudden unexpected death in epilepsy), where a person goes into a seizure and then never wakes up. The best way to prevent SUDEP is to control your seizures well.   ------------------------------------------------------------------------------------------  Please call for help (crisis line and/or 911) in case you have thoughts of harming yourself and/or others.  ------------------------------------------------------------------------------------------  INSTRUCTIONS FOR YOUR FAMILY/CAREGIVERS:  Please call 911 if the patient has a seizure longer than 2-3 minutes, if seizures are back to back without her recovering to her baseline, or she does not start recovering within 5-10 minutes after the seizure stops. During the seizure - please turn her on her side, please make sure her head is protected (for example, you should put a pillow under her head, if one is available), and please do not put anything in her mouth.   ------------------------------------------------------------------------------------------  PATIENT EXPECTATIONS,  IMPORTANT APPOINTMENT REMINDERS, AND ADDITIONAL HELPFUL TIPS:   REFILLS:   Request refills AT LEAST 1 week in advance to ensure you do not run out of medications    MyChart  It is STRONGLY encouraged that ALL patients sign up for MyChart. It is BY FAR the fastest and most convenient way for both Dr. Jackson and patients to obtain timely refills.  If you are having trouble signing up or logging into your account, staff are available to help you. Please ask a medical assistant or staff at the  to assist you.    TEST RESULS:    All labs and diagnostic test results will be reviewed at your next visit, UNLESS  Dr. Jackson determines that there are important findings on the tests need to be acted on sooner. Dr. Jackson will either call or send a message through [employer] if this is the case.    BE PREPARED PRIOR TO EVERY APPOINTMENT:  All patient are responsible for ensuring that ALL test results that were completed outside of the [facility] system have been received by our Neurology Department PRIOR to your appointment with Dr. Jackson.    IMPORTANT:  ALL images (not just the reports) must be sent and uploaded to the [employer] system. Dr. Jackson reviews all images personally prior to each visit. Ensuring that ALL the test results and test images are accessible to Dr. Jackson prior to your appointment is YOUR responsibility and an important part of making the most out of each appointment.   Bring a government-issues picture ID and an updated insurance card EVERY visit.  It is highly recommended that you bring at every visit a list of the most important topics that you want address. While it may not be possible to address all items on the list in a single visit, preparing a list will ensure that Dr. Jackson addresses the items that are most important to you and your health    PAPERWORK, DOCUMENTATION, LETTER REQUESTS:  You must notify the office ahead of your appointment of all paperwork or letter requests.   Please DO NOT wait until the last minute to make these requests. Please give all paperwork to the medical assistant at the start of the appointment and check-in process. Please note that Dr. Jackson may not be able complete some types of documentation in a single appointment or even within a single day or week. This is why it is important to communicate paperwork requests prior to your appointment and at least 2 weeks prior to any deadlines.    KNOW ALL YOU MEDICATIONS:   AT EVERY SINGLE APPOINTMENT, please bring a list of every single prescribed,  non-prescribed, and over the counter medication or supplements you are taking, including ones taken on a rare or intermittent basis.  Include the following information for each prescribed or non-prescribed medications:  Name of medication   The strength of EACH pill/capsule/tablet, etc.   The number of pills/capsules/tablets, etc taken per dose  The number and time of day that doses are taken  For every single Supplement that you take on a routine or intermittent basis, you must include:  The Brand Name   A complete list of every single ingredient, compound, vitamin, and/or mineral in each dose, along with the corresponding amounts/strengths of all ingredients, vitamins, minerals, etc., if such information is provided or known  The number of doses taken per day and time of day doses are taken  If medications are taken on an intermittent or as needed basis, please estimate how many days per week or days per month the medications are used  DO NOT just print out your medication list from [facility] or bring a list from a prior appointment or hospitalizations because the information is often often unreliable, inaccurate, outdated, and/or incomplete   The list should be printed or written  If you forget or do not have a list of all the medication, then it is acceptable, although less preferred, to bring all the bottles to the appointment     ARRIVE EARLY FOR ALL VISITS:  Please note that we are unable to accommodate late arrivals as per office policy.  YOU-the patient - (NOT a parent, spouse, or friend) must be physically present at check-in no later than 12 minutes after the scheduled appointment time, or you will be asked to reschedule   Consider scheduling a virtual appointment with Dr. Jackson through [facility] as an alternative if transportation to the clinic is difficult or unavailable   Please note, however, that virtual visits can only be scheduled after being an established patient of Dr. Jackson. All new appointments  "must be done in-person in clinic  Some insurances will not cover the cost of virtual appointments. Please check with your insurance to find out if these visits are covered    COMMUNICATING URGENT AND NON-URGENT MATTERS:  Your concerns are important and deserve to be heard and addressed. If you have an urgent matter, there are two methods that will ensure your concerns are prioritized appropriately:   Preferred method: Sign-up/Login to your [employer] account and send a message addressed to Dr. Jackson or Meena Valera (Dr. Jackson's assistant). In the subject line, type \"urgent\" followed by a word or phrase describing the situation (For example, write \"Urgent: Out of antiseuzre med and need refill\" or \"Urgent: Severe side effects to new meds\". In doing this, our staff can ensure urgent messages are triaged appropriately and communicated to Dr. Jackson that day.  Call [facility] main line at [phone]. Dr. Jackson's voicemail extension is [phone]. When leaving a voice message, specifically indicate if it is urgent (or non-urgent) so that the matter can be triaged appropriately and addressed in a timely manner    Thank you for entrusting your neurological care to [facility] and we look forward to continuing to serve you.   "

## 2024-05-22 ENCOUNTER — APPOINTMENT (OUTPATIENT)
Dept: NEUROLOGY | Facility: MEDICAL CENTER | Age: 44
End: 2024-05-22
Attending: STUDENT IN AN ORGANIZED HEALTH CARE EDUCATION/TRAINING PROGRAM
Payer: MEDICARE

## (undated) DEVICE — GLOVE BIOGEL PI INDICATOR SZ 8.0 SURGICAL PF LF -(50/BX 4BX/CA)

## (undated) DEVICE — LIGASURE 5MM BLUNT TIP LONG - 44CM (6EA/PK)

## (undated) DEVICE — BANDAGE ELASTIC STERILE MATRIX 6 X 10 (20EA/CA)

## (undated) DEVICE — DRAPE C ARMOR (12EA/CA)

## (undated) DEVICE — SUTURE 0 VICRYL PLUS CT-2 - 27 INCH (36/BX)

## (undated) DEVICE — GLOVE SZ 8 BIOGEL PI MICRO - PF LF (50PR/BX)

## (undated) DEVICE — DRAPE 36X28IN RAD CARM BND BG - (25/CA) O

## (undated) DEVICE — WATER IRRIGATION STERILE 1000ML (12EA/CA)

## (undated) DEVICE — GUIDE WIRE

## (undated) DEVICE — LOCKING DRILL 4.2X360MM

## (undated) DEVICE — MASK OXYGEN VNYL ADLT MED CONC WITH 7 FOOT TUBING  - (50EA/CA)

## (undated) DEVICE — HEAD HOLDER JUNIOR/ADULT

## (undated) DEVICE — BLADE SURGICAL #10 - (50/BX)

## (undated) DEVICE — TUBING CLEARLINK DUO-VENT - C-FLO (48EA/CA)

## (undated) DEVICE — CANISTER SUCTION 3000ML MECHANICAL FILTER AUTO SHUTOFF MEDI-VAC NONSTERILE LF DISP  (40EA/CA)

## (undated) DEVICE — SET LEADWIRE 5 LEAD BEDSIDE DISPOSABLE ECG (1SET OF 5/EA)

## (undated) DEVICE — SENSOR SPO2 NEO LNCS ADHESIVE (20/BX) SEE USER NOTES

## (undated) DEVICE — ELECTRODE 850 FOAM ADHESIVE - HYDROGEL RADIOTRNSPRNT (50/PK)

## (undated) DEVICE — SUTURE 4-0 MONOCRYL PLUS PS-2 - 27 INCH (36/BX)

## (undated) DEVICE — GOWN WARMING STANDARD FLEX - (30/CA)

## (undated) DEVICE — CANNULA W/ SUPPLY TUBING O2 - (50/CA)

## (undated) DEVICE — DRESSING TRANSPARENT FILM TEGADERM 2.375 X 2.75"  (100EA/BX)"

## (undated) DEVICE — PROTECTOR ULNA NERVE - (36PR/CA)

## (undated) DEVICE — TOWEL STOP TIMEOUT SAFETY FLAG (40EA/CA)

## (undated) DEVICE — ELECTRODE DUAL RETURN W/ CORD - (50/PK)

## (undated) DEVICE — PAD SANITARY 11IN MAXI IND WRAPPED  (12EA/PK 24PK/CA)

## (undated) DEVICE — SET SUCTION/IRRIGATION WITH DISPOSABLE TIP (6/CA )PART #0250-070-520 IS A SUB

## (undated) DEVICE — DRAPE LARGE 3 QUARTER - (20/CA)

## (undated) DEVICE — KIT ANESTHESIA W/CIRCUIT & 3/LT BAG W/FILTER (20EA/CA)

## (undated) DEVICE — MASK ANESTHESIA ADULT  - (100/CA)

## (undated) DEVICE — SUCTION INSTRUMENT YANKAUER BULBOUS TIP W/O VENT (50EA/CA)

## (undated) DEVICE — GLOVE BIOGEL INDICATOR SZ 7.5 SURGICAL PF LTX - (50PR/BX 4BX/CA)

## (undated) DEVICE — KIT CARPAL TUNNEL ENDOSCOPIC

## (undated) DEVICE — SUTURE 4-0 ETHILON PS-2 18 BLACK (36PK/BX)

## (undated) DEVICE — DRAPE STRLE REG TOWEL 18X24 - (10/BX 4BX/CA)"

## (undated) DEVICE — LACTATED RINGERS INJ 1000 ML - (14EA/CA 60CA/PF)

## (undated) DEVICE — PACK LAPAROSCOPY - (1/CA)

## (undated) DEVICE — TOURNIQUET CUFF 18 X 3 ONE PORT DISP - STERILE (10/BX)

## (undated) DEVICE — K-WIRE 3MMX285MM STERILE

## (undated) DEVICE — GLOVE SZ 7.5 BIOGEL PI MICRO - PF LF (50PR/BX)

## (undated) DEVICE — NEPTUNE 4 PORT MANIFOLD - (20/PK)

## (undated) DEVICE — SET EXTENSION WITH 2 PORTS (48EA/CA) ***PART #2C8610 IS A SUBSTITUTE*****

## (undated) DEVICE — SENSOR OXIMETER ADULT SPO2 RD SET (20EA/BX)

## (undated) DEVICE — SODIUM CHL IRRIGATION 0.9% 1000ML (12EA/CA)

## (undated) DEVICE — DRAPESURG STERI-DRAPE LONG - (10/BX 4BX/CA)

## (undated) DEVICE — STOCKINET BIAS 4 IN STERILE - (20/CA)

## (undated) DEVICE — SUTURE 3-0 ETHILON FSLX 30 (36PK/BX)"

## (undated) DEVICE — GLOVE BIOGEL SZ 7.5 SURGICAL PF LTX - (50PR/BX 4BX/CA)

## (undated) DEVICE — CANNULA W/SEAL 5X100 Z-THRE - ADED KII (12/BX)

## (undated) DEVICE — SPONGE GAUZESTER. 2X2 4-PL - (2/PK 50PK/BX 30BX/CS)

## (undated) DEVICE — SLEEVE, VASO, REPROC, LARGE

## (undated) DEVICE — GLOVE BIOGEL SZ 7 SURGICAL PF LTX - (50PR/BX 4BX/CA)

## (undated) DEVICE — SUTURE 0 VICRYL PLUS CT-1 - 36 INCH (36/BX)

## (undated) DEVICE — ARMREST CRADLE FOAM - (2PR/PK 12PR/CA)

## (undated) DEVICE — TROCAR 5X100 BLADED Z-THREAD - KII (6/BX)

## (undated) DEVICE — SUTURE 2-0 VICRYL PLUS CT-1 36 (36PK/BX)"

## (undated) DEVICE — SLEEVE, VASO, THIGH, MED

## (undated) DEVICE — TROCAR Z THREAD 12 X 100 - BLADED (6/BX)

## (undated) DEVICE — KIT  I.V. START (100EA/CA)

## (undated) DEVICE — SUTURE GENERAL

## (undated) DEVICE — NEEDLE INSFL 120MM 14GA VRRS - (20/BX)

## (undated) DEVICE — PACK LOWER EXTREMITY - (2/CA)

## (undated) DEVICE — FREEHAND DRILL 4.2X185MM

## (undated) DEVICE — SLEEVE VASO CALF MED - (10PR/CA)

## (undated) DEVICE — GLOVE SZ 7 BIOGEL PI MICRO - PF LF (50PR/BX 4BX/CA)

## (undated) DEVICE — GOWN SURGEONS X-LARGE - DISP. (30/CA)

## (undated) DEVICE — PAD LAP STERILE 18 X 18 - (5/PK 40PK/CA)

## (undated) DEVICE — GUIDE WIRE BALL TIP 3X1000 STERILE

## (undated) DEVICE — PACK UPPER EXTREMITY (2EA/CA)

## (undated) DEVICE — REAMER SHAFT  MODIFIED TRINKLE  8X510MM

## (undated) DEVICE — CANISTER SUCTION RIGID RED 1500CC (40EA/CA)

## (undated) DEVICE — GLOVE BIOGEL PI ORTHO SZ 8 PF LF (40PR/BX)

## (undated) DEVICE — TUBE CONNECTING SUCTION - CLEAR PLASTIC STERILE 72 IN (50EA/CA)

## (undated) DEVICE — PADDING CAST 6 IN STERILE - 6 X 4 YDS (24/CA)

## (undated) DEVICE — PADDING CAST 4 IN STERILE - 4 X 4 YDS (24/CA)